# Patient Record
Sex: FEMALE | Race: BLACK OR AFRICAN AMERICAN | Employment: FULL TIME | URBAN - METROPOLITAN AREA
[De-identification: names, ages, dates, MRNs, and addresses within clinical notes are randomized per-mention and may not be internally consistent; named-entity substitution may affect disease eponyms.]

---

## 2024-03-17 ENCOUNTER — HOSPITAL ENCOUNTER (EMERGENCY)
Facility: HOSPITAL | Age: 54
Discharge: HOME | End: 2024-03-17
Attending: STUDENT IN AN ORGANIZED HEALTH CARE EDUCATION/TRAINING PROGRAM
Payer: COMMERCIAL

## 2024-03-17 ENCOUNTER — APPOINTMENT (OUTPATIENT)
Dept: CT IMAGING | Facility: HOSPITAL | Age: 54
End: 2024-03-17
Attending: STUDENT IN AN ORGANIZED HEALTH CARE EDUCATION/TRAINING PROGRAM
Payer: COMMERCIAL

## 2024-03-17 ENCOUNTER — APPOINTMENT (OUTPATIENT)
Dept: GENERAL RADIOLOGY | Facility: HOSPITAL | Age: 54
End: 2024-03-17
Attending: STUDENT IN AN ORGANIZED HEALTH CARE EDUCATION/TRAINING PROGRAM
Payer: COMMERCIAL

## 2024-03-17 VITALS
HEART RATE: 80 BPM | RESPIRATION RATE: 19 BRPM | OXYGEN SATURATION: 96 % | DIASTOLIC BLOOD PRESSURE: 63 MMHG | BODY MASS INDEX: 47.8 KG/M2 | TEMPERATURE: 98 F | HEIGHT: 64 IN | SYSTOLIC BLOOD PRESSURE: 111 MMHG | WEIGHT: 280 LBS

## 2024-03-17 DIAGNOSIS — K21.00 GASTROESOPHAGEAL REFLUX DISEASE WITH ESOPHAGITIS WITHOUT HEMORRHAGE: Primary | ICD-10-CM

## 2024-03-17 LAB
ALBUMIN SERPL-MCNC: 4.6 G/DL (ref 3.2–4.8)
ALBUMIN/GLOB SERPL: 1.5 {RATIO} (ref 1–2)
ALP LIVER SERPL-CCNC: 109 U/L
ALT SERPL-CCNC: 13 U/L
ANION GAP SERPL CALC-SCNC: 7 MMOL/L (ref 0–18)
AST SERPL-CCNC: 23 U/L (ref ?–34)
BASOPHILS # BLD AUTO: 0.02 X10(3) UL (ref 0–0.2)
BASOPHILS NFR BLD AUTO: 0.5 %
BILIRUB SERPL-MCNC: 0.5 MG/DL (ref 0.3–1.2)
BUN BLD-MCNC: 12 MG/DL (ref 9–23)
BUN/CREAT SERPL: 12.2 (ref 10–20)
CALCIUM BLD-MCNC: 9.9 MG/DL (ref 8.7–10.4)
CHLORIDE SERPL-SCNC: 107 MMOL/L (ref 98–112)
CO2 SERPL-SCNC: 28 MMOL/L (ref 21–32)
CREAT BLD-MCNC: 0.98 MG/DL
DEPRECATED RDW RBC AUTO: 37.3 FL (ref 35.1–46.3)
EGFRCR SERPLBLD CKD-EPI 2021: 68 ML/MIN/1.73M2 (ref 60–?)
EOSINOPHIL # BLD AUTO: 0.16 X10(3) UL (ref 0–0.7)
EOSINOPHIL NFR BLD AUTO: 4 %
ERYTHROCYTE [DISTWIDTH] IN BLOOD BY AUTOMATED COUNT: 11.7 % (ref 11–15)
GLOBULIN PLAS-MCNC: 3.1 G/DL (ref 2.8–4.4)
GLUCOSE BLD-MCNC: 96 MG/DL (ref 70–99)
HCT VFR BLD AUTO: 40.3 %
HGB BLD-MCNC: 12.8 G/DL
IMM GRANULOCYTES # BLD AUTO: 0.01 X10(3) UL (ref 0–1)
IMM GRANULOCYTES NFR BLD: 0.3 %
LYMPHOCYTES # BLD AUTO: 1.68 X10(3) UL (ref 1–4)
LYMPHOCYTES NFR BLD AUTO: 42.2 %
MCH RBC QN AUTO: 28.2 PG (ref 26–34)
MCHC RBC AUTO-ENTMCNC: 31.8 G/DL (ref 31–37)
MCV RBC AUTO: 88.8 FL
MONOCYTES # BLD AUTO: 0.41 X10(3) UL (ref 0.1–1)
MONOCYTES NFR BLD AUTO: 10.3 %
NEUTROPHILS # BLD AUTO: 1.7 X10 (3) UL (ref 1.5–7.7)
NEUTROPHILS # BLD AUTO: 1.7 X10(3) UL (ref 1.5–7.7)
NEUTROPHILS NFR BLD AUTO: 42.7 %
OSMOLALITY SERPL CALC.SUM OF ELEC: 294 MOSM/KG (ref 275–295)
PLATELET # BLD AUTO: 299 10(3)UL (ref 150–450)
POTASSIUM SERPL-SCNC: 4 MMOL/L (ref 3.5–5.1)
PROT SERPL-MCNC: 7.7 G/DL (ref 5.7–8.2)
RBC # BLD AUTO: 4.54 X10(6)UL
SODIUM SERPL-SCNC: 142 MMOL/L (ref 136–145)
TROPONIN I SERPL HS-MCNC: <3 NG/L
WBC # BLD AUTO: 4 X10(3) UL (ref 4–11)

## 2024-03-17 PROCEDURE — 93005 ELECTROCARDIOGRAM TRACING: CPT

## 2024-03-17 PROCEDURE — 93010 ELECTROCARDIOGRAM REPORT: CPT

## 2024-03-17 PROCEDURE — 85025 COMPLETE CBC W/AUTO DIFF WBC: CPT | Performed by: STUDENT IN AN ORGANIZED HEALTH CARE EDUCATION/TRAINING PROGRAM

## 2024-03-17 PROCEDURE — 80053 COMPREHEN METABOLIC PANEL: CPT | Performed by: STUDENT IN AN ORGANIZED HEALTH CARE EDUCATION/TRAINING PROGRAM

## 2024-03-17 PROCEDURE — 96374 THER/PROPH/DIAG INJ IV PUSH: CPT

## 2024-03-17 PROCEDURE — 99285 EMERGENCY DEPT VISIT HI MDM: CPT

## 2024-03-17 PROCEDURE — 71045 X-RAY EXAM CHEST 1 VIEW: CPT | Performed by: STUDENT IN AN ORGANIZED HEALTH CARE EDUCATION/TRAINING PROGRAM

## 2024-03-17 PROCEDURE — 84484 ASSAY OF TROPONIN QUANT: CPT | Performed by: STUDENT IN AN ORGANIZED HEALTH CARE EDUCATION/TRAINING PROGRAM

## 2024-03-17 PROCEDURE — S0028 INJECTION, FAMOTIDINE, 20 MG: HCPCS | Performed by: STUDENT IN AN ORGANIZED HEALTH CARE EDUCATION/TRAINING PROGRAM

## 2024-03-17 PROCEDURE — 71260 CT THORAX DX C+: CPT | Performed by: STUDENT IN AN ORGANIZED HEALTH CARE EDUCATION/TRAINING PROGRAM

## 2024-03-17 PROCEDURE — 96375 TX/PRO/DX INJ NEW DRUG ADDON: CPT

## 2024-03-17 RX ORDER — KETOROLAC TROMETHAMINE 15 MG/ML
15 INJECTION, SOLUTION INTRAMUSCULAR; INTRAVENOUS ONCE
Status: COMPLETED | OUTPATIENT
Start: 2024-03-17 | End: 2024-03-17

## 2024-03-17 RX ORDER — FAMOTIDINE 20 MG/1
20 TABLET, FILM COATED ORAL 2 TIMES DAILY PRN
Qty: 30 TABLET | Refills: 0 | Status: SHIPPED | OUTPATIENT
Start: 2024-03-17 | End: 2024-04-16

## 2024-03-17 RX ORDER — FAMOTIDINE 10 MG/ML
20 INJECTION, SOLUTION INTRAVENOUS ONCE
Status: COMPLETED | OUTPATIENT
Start: 2024-03-17 | End: 2024-03-17

## 2024-03-17 NOTE — ED INITIAL ASSESSMENT (HPI)
Pt arrives by herself for midsternal chest pain radiating to her back, pt states she woke up to the pain last night. Pt denies shortness of breath.

## 2024-03-18 LAB
ATRIAL RATE: 87 BPM
P AXIS: 53 DEGREES
P-R INTERVAL: 172 MS
Q-T INTERVAL: 412 MS
QRS DURATION: 146 MS
QTC CALCULATION (BEZET): 495 MS
R AXIS: -34 DEGREES
T AXIS: 27 DEGREES
VENTRICULAR RATE: 87 BPM

## 2024-03-18 NOTE — ED PROVIDER NOTES
[place] Emergency Department Note  Patient: Damari Waterman Age: 53 year old Sex: female      MRN: [id number]  : 1970    Patient Seen in: [facility] Emergency Department    History     Chief Complaint   Patient presents with    Chest Pain     Stated Complaint: chest pain    History obtained from: Patient     53-year-old female with past medical history of hypertension, asthma, lupus presenting today for evaluation of chest pain.  She states that starting last night, she was laying in bed when she developed pain and heaviness in the center of her chest rating to her back.  She states that the pain woke her up from sleep at 1 point last night.  States that pain has persisted through today.  She denies any associate shortness of breath.  Denies any nausea or vomiting.  Denies any worsening with exertion.  Denies any lower extremity edema.  Denies any numbness or tingling of the extremities.  Denies any headaches, neck pain or vision changes.    Review of Systems:  Review of Systems  Positive for stated complaint: chest pain. Constitutional and vital signs reviewed. All other systems reviewed and negative except as noted above.    Patient History:  Past Medical History:   Diagnosis Date    Asthma (HCC)     Essential hypertension     Glaucoma     Lupus (HCC)        Past Surgical History:   Procedure Laterality Date    TOTAL ABDOM HYSTERECTOMY          No family history on file.    Specific Social Determinants of Health:   Social History     Socioeconomic History    Marital status: Single   Tobacco Use    Smoking status: Never    Smokeless tobacco: Never   Vaping Use    Vaping Use: Never used   Substance and Sexual Activity    Alcohol use: Never    Drug use: Never           PSFH elements reviewed from today and agreed except as otherwise stated in HPI.    Physical Exam     ED Triage Vitals [24 1644]   /73   Pulse 83   Resp 16   Temp 98.1 °F (36.7 °C)   Temp src Oral   SpO2 98 %   O2 Device None  (Room air)       Current:/63   Pulse 80   Temp 98.1 °F (36.7 °C) (Oral)   Resp 19   Ht 162.6 cm (5' 4\")   Wt 127 kg   SpO2 96%   BMI 48.06 kg/m²         Physical Exam  Constitutional:       General: She is not in acute distress.  HENT:      Head: Normocephalic and atraumatic.      Mouth/Throat:      Mouth: Mucous membranes are moist.   Eyes:      Extraocular Movements: Extraocular movements intact.   Cardiovascular:      Rate and Rhythm: Normal rate and regular rhythm.      Heart sounds: Normal heart sounds.   Pulmonary:      Effort: Pulmonary effort is normal. No respiratory distress.      Breath sounds: Normal breath sounds.   Abdominal:      Palpations: Abdomen is soft.      Tenderness: There is no abdominal tenderness.   Skin:     General: Skin is warm and dry.      Capillary Refill: Capillary refill takes less than 2 seconds.      Findings: No rash.   Neurological:      General: No focal deficit present.      Mental Status: She is alert and oriented to person, place, and time.   Psychiatric:         Mood and Affect: Mood normal.         Behavior: Behavior normal.         ED Course   Labs:   Labs Reviewed   COMP METABOLIC PANEL (14) - Abnormal; Notable for the following components:       Result Value    Alkaline Phosphatase 109 (*)     All other components within normal limits   TROPONIN I HIGH SENSITIVITY - Normal   CBC WITH DIFFERENTIAL WITH PLATELET    Narrative:     The following orders were created for panel order CBC With Differential With Platelet.  Procedure                               Abnormality         Status                     ---------                               -----------         ------                     CBC W/ DIFFERENTIAL[[id number]]                              Final result                 Please view results for these tests on the individual orders.   RAINBOW DRAW LAVENDER   RAINBOW DRAW LIGHT GREEN   CBC W/ DIFFERENTIAL     Radiology findings:  I personally reviewed the  images.   CT CHEST PE AORTA (IV ONLY) (CPT=71260)    Result Date: 3/17/2024  CONCLUSION:   No evidence of acute pulmonary embolism to the level of the main pulmonary arterial level.  More distal assessment cannot be reliably performed due to technical limitations.  No evidence of right heart strain.  Small hiatal hernia.  Diffuse esophageal wall thickening, which can be seen in the setting of chronic gastroesophageal reflux.     Dictated by (CST): Audelia Interiano MD on 3/17/2024 at 7:41 PM     Finalized by (CST): Audelia Interiano MD on 3/17/2024 at 7:46 PM          XR CHEST AP PORTABLE  (CPT=71045)    Result Date: 3/17/2024  CONCLUSION:   Pulmonary vascular congestion, without overt pulmonary edema.  Minimal left basilar subsegmental atelectasis.    Dictated by (CST): Audelia Interiano MD on 3/17/2024 at 6:10 PM     Finalized by (CST): Audelia Interiano MD on 3/17/2024 at 6:12 PM           EKG as interpreted by me: Ventricular rate 87, normal sinus rhythm, left axis, no AK interval prolongation, widened QRS consistent with right bundle branch block, no ST segment elevations or depressions, no abnormal T wave inversions  Cardiac Monitor: Interpreted by me.   Pulse Readings from Last 1 Encounters:   03/17/24 80   , sinus,     External non-ED records reviewed independently by me: PCP note from 1/28/2020 reviewed confirming patient has a past medical history of asthma and depression.    MDM   53-year-old female with past medical history of hypertension, asthma, lupus presenting today for evaluation of chest pain since last night.  Upon arrival to emergency department, well-appearing and in no acute distress.  Vitals are within normal limits.  Lungs clear to auscultation bilaterally no increased work of breathing.  Abdomen is soft and nontender.  No lower extremity edema.    Differential diagnoses considered includes, but is not limited to: ACS, pulmonary embolism, aortic dissection, GERD, biliary obstructive  process,    Will obtain the following tests: CBC, CMP, troponin, chest x-ray, CT PE, EKG  Please see ED course for my independent review of these tests/imaging results.    Initial Medications/Therapeutics administered: Toradol, Pepcid    Chronic conditions affecting care: Hypertension, asthma, lupus    Workup and medications considered but not ordered: None    Social Determinants of Health that impacted care: None     ED course: Independent reviewed the chest x-ray images that show no evidence of focal opacity to suggest pneumonia.  Labs reassuring with negative troponin.  Electrolytes within normal limits.  No evidence of biliary obstructive process.  CT PE images show no evidence of PE or aortic dissection.  Does show esophageal wall thickening consistent with esophagitis likely secondary to GERD.  Suspect patient's symptoms secondary to acid reflux with element of esophagitis.  Discussed trial course of Pepcid as well as close GI follow-up should symptoms not improve within the next 1 to 2 weeks.  Return precautions were provided and all questions answered.  Patient expressed understanding and agreement with this plan.      Disposition and Plan     Clinical Impression:  1. Gastroesophageal reflux disease with esophagitis without hemorrhage        Disposition:  Discharge    Follow-up:  Flor Jenkins  [address] [place] [id number] [phone]    Schedule an appointment as soon as possible for a visit in 2 day(s)  As needed, If symptoms worsen    Kiran Fraser MD  [address] [id number] [phone]    Schedule an appointment as soon as possible for a visit in 1 week(s)  As needed, If symptoms worsen      Medications Prescribed:  Discharge Medication List as of 3/17/2024  8:15 PM        START taking these medications    Details   famotidine (PEPCID) 20 MG Oral Tab Take 1 tablet (20 mg total) by mouth 2 (two) times daily as needed for Heartburn., Print, Disp-30 tablet,  R-0               This note may have been created using voice dictation technology and may include inadvertent errors.      Cynthia Yip MD  Emergency Medicine

## 2024-03-18 NOTE — DISCHARGE INSTRUCTIONS
Thank you for seeking care at [facility] Emergency Department.    You have been seen and evaluated.    We discussed the results of your workup   Please read the instructions provided   If given prescriptions, take as instructed    Your symptoms are likely related to acid reflux, also called Gastroesophageal Reflux Disease (GERD). You should elevate the head of your bed at night to decrease the amount of acid that is refluxing into your esophagus. This can be done either by putting six- to eight-inch blocks under the legs at the head of the bed or a Styrofoam wedge under the mattress, or by using several pillows under your upper back. Also, avoid eating two to three hours before bedtime or lying down soon after eating. Eliminate food triggers of pain - foods that you note to worsen or cause your symptoms. Such foods typically include fatty foods, tomatoes, caffeine, chocolate, spicy foods, food with high fat content, carbonated beverages, and peppermint. Avoid tight-fitting clothes.     Remember, your care process does not end after your visit today. Please follow-up with your doctor within 1-2 days for a follow-up check to ensure you are  improving, to see if you need any further evaluation/testing, or to evaluate for any alternate diagnoses.     Please return to the emergency department if you develop difficulty breathing, fevers, inability to keep down fluids, chest pain, worsening abdominal pain, or if you develop any other new or concerning symptoms as these could be signs of more serious medical illness.    We hope you feel better.

## 2024-03-20 ENCOUNTER — TELEPHONE (OUTPATIENT)
Facility: CLINIC | Age: 54
End: 2024-03-20

## 2024-03-20 NOTE — TELEPHONE ENCOUNTER
Spoke to patient    Scheduled her for a discharge f/u with the first available appt.  Advised patient to bring insurance card to visit    Location, date, and time confirmed with patient  Sent patient link for [facility]    Your Appointments      Monday April 08, 2024  3:00 PM  Consult with Lisa Mac PA-C  [facility] ([facility]) [facility] [address] [address]  [phone]

## 2024-04-01 ENCOUNTER — OFFICE VISIT (OUTPATIENT)
Facility: CLINIC | Age: 54
End: 2024-04-01

## 2024-04-01 ENCOUNTER — LAB ENCOUNTER (OUTPATIENT)
Dept: LAB | Facility: HOSPITAL | Age: 54
End: 2024-04-01
Attending: PHYSICIAN ASSISTANT
Payer: COMMERCIAL

## 2024-04-01 ENCOUNTER — TELEPHONE (OUTPATIENT)
Facility: CLINIC | Age: 54
End: 2024-04-01

## 2024-04-01 VITALS
HEART RATE: 86 BPM | SYSTOLIC BLOOD PRESSURE: 163 MMHG | HEIGHT: 64 IN | WEIGHT: 276 LBS | BODY MASS INDEX: 47.12 KG/M2 | DIASTOLIC BLOOD PRESSURE: 100 MMHG

## 2024-04-01 DIAGNOSIS — K21.9 GASTROESOPHAGEAL REFLUX DISEASE, UNSPECIFIED WHETHER ESOPHAGITIS PRESENT: ICD-10-CM

## 2024-04-01 DIAGNOSIS — R93.5 ABNORMAL CT OF THE ABDOMEN: ICD-10-CM

## 2024-04-01 DIAGNOSIS — Z12.11 COLON CANCER SCREENING: Primary | ICD-10-CM

## 2024-04-01 DIAGNOSIS — R93.89 ABNORMAL CT SCAN: ICD-10-CM

## 2024-04-01 DIAGNOSIS — Z12.11 COLON CANCER SCREENING: ICD-10-CM

## 2024-04-01 DIAGNOSIS — Z80.0 FAMILY HISTORY OF COLON CANCER: ICD-10-CM

## 2024-04-01 DIAGNOSIS — K21.9 GASTROESOPHAGEAL REFLUX DISEASE WITHOUT ESOPHAGITIS: ICD-10-CM

## 2024-04-01 DIAGNOSIS — K21.9 GASTROESOPHAGEAL REFLUX DISEASE WITHOUT ESOPHAGITIS: Primary | ICD-10-CM

## 2024-04-01 PROCEDURE — 83013 H PYLORI (C-13) BREATH: CPT

## 2024-04-01 RX ORDER — AMLODIPINE BESYLATE 5 MG/1
TABLET ORAL
COMMUNITY

## 2024-04-01 RX ORDER — ALBUTEROL SULFATE 90 UG/1
AEROSOL, METERED RESPIRATORY (INHALATION)
COMMUNITY

## 2024-04-01 RX ORDER — LOSARTAN POTASSIUM 100 MG/1
1 TABLET ORAL DAILY
COMMUNITY

## 2024-04-01 RX ORDER — SEMAGLUTIDE 2.68 MG/ML
INJECTION, SOLUTION SUBCUTANEOUS
COMMUNITY
Start: 2024-03-24

## 2024-04-01 RX ORDER — DOXYCYCLINE 100 MG/1
CAPSULE ORAL
COMMUNITY

## 2024-04-01 RX ORDER — PANTOPRAZOLE SODIUM 40 MG/1
40 TABLET, DELAYED RELEASE ORAL
Qty: 180 TABLET | Refills: 0 | Status: SHIPPED | OUTPATIENT
Start: 2024-04-01 | End: 2024-06-30

## 2024-04-01 RX ORDER — DEXLANSOPRAZOLE 60 MG/1
CAPSULE, DELAYED RELEASE ORAL
COMMUNITY

## 2024-04-01 RX ORDER — MONTELUKAST SODIUM 10 MG/1
1 TABLET ORAL DAILY
COMMUNITY

## 2024-04-01 RX ORDER — PANTOPRAZOLE SODIUM 40 MG/1
1 TABLET, DELAYED RELEASE ORAL DAILY
COMMUNITY
End: 2024-04-01

## 2024-04-01 RX ORDER — ERGOCALCIFEROL 1.25 MG/1
50000 CAPSULE ORAL WEEKLY
COMMUNITY
Start: 2024-03-11

## 2024-04-01 RX ORDER — LAMOTRIGINE 150 MG/1
TABLET ORAL
COMMUNITY

## 2024-04-01 RX ORDER — FUROSEMIDE 20 MG/1
TABLET ORAL
COMMUNITY

## 2024-04-01 RX ORDER — TRIAMCINOLONE ACETONIDE 1 MG/G
CREAM TOPICAL
COMMUNITY

## 2024-04-01 NOTE — TELEPHONE ENCOUNTER
Pt finished their appt with Estefania.  Estefania mentioned to follow up in 2 months but currently doesn't have a schedule up.  Please advise

## 2024-04-01 NOTE — TELEPHONE ENCOUNTER
Patient called.  She said she was to set up a follow up in 4-6 weeks.  Accepted below appointment and aware it will be at the same location as her appointment today.    Your Appointments      Monday May 06, 2024  1:00 PM  Follow Up Visit with Lisa Mac PA-C  [facility] ([facility]) [facility] [address] [address]  [phone]

## 2024-04-01 NOTE — PROGRESS NOTES
[facility] - Gastroenterology                                                                                                               Reason for consult:   Chief Complaint   Patient presents with    Hospital F/U       Requesting physician or provider: Flor Jenkins      HPI:   Damari Waterman is a 53 year old year-old female with history of HTN, asthma, lupus who presents for ER follow up.     Patient seen in ER on 3/17/24 for chest pain. ED course: Independent reviewed the chest x-ray images that show no evidence of focal opacity to suggest pneumonia. Labs reassuring with negative troponin. Electrolytes within normal limits. No evidence of biliary obstructive process. CT PE images show no evidence of PE or aortic dissection. Does show esophageal wall thickening consistent with esophagitis likely secondary to GERD.     Since being seen in the ER she was started on famotidine. She feels as though the chest pain has improved slightly. Can have intermittent nausea and vomiting. She can have epigastric pain that can feel as though her abdomen is in a knot. Endorses dysphagia about once a week. No odynophagia. Appetite is good. No unintentional weight loss.     Has BM every other day. She has to strain al lot with each BM.  she denies brbpr and/or melena.    Most recent blood work 3/17 without anemia.     NSAIDS: PRN, naproxen  Tobacco: none  Alcohol: none  Marijuana: none  Illicit drugs: none    FH GI malignancy- sister colon cancer  FH celiac dz- none  FH liver dz- none  FH IBD- none    Last endoscopies:  7/19/2019- Colonoscopy  FINDINGS:   Colon polyps     RECOMMENDATIONS:   1. Follow up colonoscopy in in 5 year(s) pending any final pathology results and barring any intervening symptoms/concerns or changes in family history.       Wt Readings from Last 6 Encounters:   04/01/24 276 lb (125.2 kg)   03/17/24 280 lb (127 kg)        History, Medications, Allergies, ROS:       Past Medical History:   Diagnosis Date    Asthma (HCC)     Essential hypertension     Glaucoma     Lupus (HCC)       Past Surgical History:   Procedure Laterality Date    TOTAL ABDOM HYSTERECTOMY        Family Hx: History reviewed. No pertinent family history.   Social History:   Social History     Socioeconomic History    Marital status: Single   Tobacco Use    Smoking status: Never    Smokeless tobacco: Never   Vaping Use    Vaping Use: Never used   Substance and Sexual Activity    Alcohol use: Never    Drug use: Never        Medications (Active prior to today's visit):  Current Outpatient Medications   Medication Sig Dispense Refill    VENTOLIN  (90 Base) MCG/ACT Inhalation Aero Soln INHALE 2 PUFFS PO INTO THE LUNGS EVERY FOUR HOURS      amLODIPine 5 MG Oral Tab       diclofenac (VOLTAREN) 1 % External Gel       dexlansoprazole (DEXILANT) 60 MG Oral Capsule Delayed Release       Doxycycline Monohydrate 100 MG Oral Cap       ergocalciferol 1.25 MG (31851 UT) Oral Cap Take 1 capsule (50,000 Units total) by mouth once a week.      furosemide 20 MG Oral Tab       lamoTRIgine 150 MG Oral Tab       losartan 100 MG Oral Tab Take 1 tablet (100 mg total) by mouth daily.      metFORMIN 500 MG Oral Tab       montelukast 10 MG Oral Tab Take 1 tablet (10 mg total) by mouth daily.      OZEMPIC, 2 MG/DOSE, 8 MG/3ML Subcutaneous Solution Pen-injector       triamcinolone 0.1 % External Cream       pantoprazole 40 MG Oral Tab EC Take 1 tablet (40 mg total) by mouth 2 (two) times daily before meals. 180 tablet 0    famotidine (PEPCID) 20 MG Oral Tab Take 1 tablet (20 mg total) by mouth 2 (two) times daily as needed for Heartburn. 30 tablet 0       Allergies:  No Known Allergies    ROS:   CONSTITUTIONAL: negative for fevers, chills, sweats and weight loss  EYES Negative for red eyes, yellow eyes, changes in vision  HEENT: Negative for dysphagia and hoarseness  RESPIRATORY: Negative for cough and shortness of  breath  CARDIOVASCULAR: Negative for chest pain, palpitations  GASTROINTESTINAL: See HPI  GENITOURINARY: Negative for dysuria and frequency  MUSCULOSKELETAL: Negative for arthralgias and myalgias  NEUROLOGICAL: Negative for dizziness and headaches  BEHAVIOR/PSYCH: Negative for anxiety and poor appetite    PHYSICAL EXAM:   Blood pressure (!) 163/100, pulse 86, height 5' 4\" (1.626 m), weight 276 lb (125.2 kg).    GEN: WD/WN, NAD  HEENT: Supple symmetrical, trachea midline  CV: RRR, the extremities are warm and well perfused   LUNGS: No increased work of breathing  ABDOMEN: No scars, normal bowel sounds, soft, non-tender, non-distended no rebound or guarding, no masses, no hepatomegaly  MSK: No redness, no warmth, no swelling of joints  SKIN: No jaundice, no erythema, no rashes  HEMATOLOGIC: No bleeding, no bruising  NEURO: Alert and interactive, normal gait    Labs/Imaging/Procedures:     Patient's pertinent labs and imaging were reviewed and discussed with patient today.     Lab Results   Component Value Date    WBC 4.0 03/17/2024    RBC 4.54 03/17/2024    HGB 12.8 03/17/2024    HCT 40.3 03/17/2024    MCV 88.8 03/17/2024    MCH 28.2 03/17/2024    MCHC 31.8 03/17/2024    RDW 11.7 03/17/2024    .0 03/17/2024        Lab Results   Component Value Date    GLU 96 03/17/2024    BUN 12 03/17/2024    BUNCREA 12.2 03/17/2024    CREATSERUM 0.98 03/17/2024    ANIONGAP 7 03/17/2024    CA 9.9 03/17/2024    OSMOCALC 294 03/17/2024    ALKPHO 109 (H) 03/17/2024    AST 23 03/17/2024    ALT 13 03/17/2024    BILT 0.5 03/17/2024    TP 7.7 03/17/2024    ALB 4.6 03/17/2024    GLOBULIN 3.1 03/17/2024     03/17/2024    K 4.0 03/17/2024     03/17/2024    CO2 28.0 03/17/2024        CT CHEST PE AORTA (IV ONLY) (CPT=71260)    Result Date: 3/17/2024  PROCEDURE: CT CHEST PE AORTA (IV ONLY) (CPT=71260)  COMPARISON: None.  INDICATIONS: chest pain  TECHNIQUE: CT images of the chest were obtained with non-ionic intravenous contrast  material.  Automated exposure control for dose reduction was used. Adjustment of the mA and/or kV was done based on the patient's size. Use of iterative reconstruction technique for dose reduction was used. Dose information is transmitted to the ACR (American College of Radiology) NRDR (National Radiology Data Registry) which includes the Dose Index Registry.  FINDINGS:  PULMONARY VASCULATURE: This is a limited assessment for acute pulmonary embolism due to patient body habitus and timing of contrast/technique.  There is no acute pulmonary embolus involving the main or main right or left pulmonary arteries.  The lobar and more distal pulmonary arterial branches cannot be reliably assessed on this examination.  The main pulmonary artery measures normal in size at 3.0 cm.   CARDIAC: The heart is not enlarged. There is no bowing of the interventricular septum to suggest right ventricular strain.  THORACIC AORTA: There is no ectasia of the ascending thoracic aorta, without aneurysmal dilation.  MEDIASTINUM/FAB: No mass or lymphadenopathy.  This is within the limitation of phase of contrast. There is diffuse esophageal wall thickening, which can be seen in the setting of chronic gastroesophageal reflux.   LUNGS/PLEURA: The trachea and central bronchi are clear.  There is minimal dependent atelectasis at the lung bases.   CHEST WALL: No thyroidal mass, within the limitation of artifact. No axillary lymphadenopathy.  LIMITED ABDOMEN: Small hiatal hernia.  Postsurgical changes from prior gastric bypass.   BONES: There is multilevel degenerative disease of the spine.          CONCLUSION:   No evidence of acute pulmonary embolism to the level of the main pulmonary arterial level.  More distal assessment cannot be reliably performed due to technical limitations.  No evidence of right heart strain.  Small hiatal hernia.  Diffuse esophageal wall thickening, which can be seen in the setting of chronic gastroesophageal reflux.      Dictated by (CST): Audelia Interiano MD on 3/17/2024 at 7:41 PM     Finalized by (CST): Audelia Interiano MD on 3/17/2024 at 7:46 PM          XR CHEST AP PORTABLE  (CPT=71045)    Result Date: 3/17/2024  PROCEDURE: XR CHEST AP PORTABLE  (CPT=71045) TIME: 5:28 p.m..   COMPARISON: None.  INDICATIONS: Midsternal chest pain radiating to her back today. No injury.  TECHNIQUE:   Single view.   FINDINGS:   DEVICES: None. CARDIOMEDIASTINAL:The cardiomediastinal silhouette is borderline enlarged, accentuated by technique  .  FAB:     The hilar contours are within normal limits. LUNGS/PLEURA:  There is central pulmonary vascular congestion.  Minimal curvilinear opacity at the periphery of the left lung in its mid to lower portion.  Right pleural spaces are clear.  Left pleural spaces likely clear, with overlapping soft tissue at  the base. BONES/OTHER:  There is multilevel degenerative disease of the spine.           CONCLUSION:   Pulmonary vascular congestion, without overt pulmonary edema.  Minimal left basilar subsegmental atelectasis.    Dictated by (CST): Audelia Interiano MD on 3/17/2024 at 6:10 PM     Finalized by (CST): Audelia Interiano MD on 3/17/2024 at 6:12 PM                  .  ASSESSMENT/PLAN:   Damari Waterman is a 53 year old year-old female with history of HTN, asthma, lupus who presents for ER follow up.     #GERD  #abnormal CT a/p  Daily GERD. Went to ER for chest pain, cardiac work up negative CT demonstrated esophageal thickening. Discussed plan for h pylori testing and starting PPI. Will also plan for EGD with colonoscopy.     #crc screening   #family hx of colon cancer  Previous cln 7/2019. Due for 5 year recall. Notes chronic straining with BM. No recent change. No brbpr no melena. No weight loss or decreased appetite. Family hx of colon cancer with sister. Advised for repeat colonoscopy.     Recommendations:  - get h pylori testing today     GERD  -start pantoprazole 40 mg twice   -May take Tums or other  over the counter antacid to relieve intermittent heartburn  -Chew foods carefully prior to swallowing  -Increase water intake to 8 (8oz) glasses per day  -Decrease food triggers (Spicy foods, milk products near end of day, chocolate and fatty foods)  -Avoid aspirin, NSAIDs (i.e. Ibuprofen, motrin, Advil, Aleeve, naproxen), caffeine, alcohol, tobacco  -Avoid eating meals 3 hours before bed time  -Avoid wearing tight fitting clothes  -Elevate head of bed to 30 degrees with blocks under legs at head of bed     -follow up in 4-6 weeks       1. Schedule colonoscopy/EGD with Dr. Logan or Dr. Kan with [facility] [Diagnosis: crc screening, GERD, abnormal CT]  2.  bowel prep from pharmacy ([facility])    3. Continue all medications as normal for your procedure.    4. Read all bowel prep instructions carefully. Bowel prep instructions can also be found online at:  [email or web address]     5. AVOID seeds, nuts, popcorn, raw fruits and vegetables for 3 days before procedure    6. You MAY need to go for COVID testing 72 hours before procedure. The testing team will call you a few days before your procedure to discuss with you if testing is required. If you are asked to go for COVID testing and do not completed the test, the procedure cannot be performed.     7. If you start any NEW medication after your visit today, please notify us. Certain medications (like iron or weight please notify us. Certain medications (like iron or weight loss medications) will need to be held before the procedure, or the procedure cannot be performed safely.          Orders This Visit:  Orders Placed This Encounter   Procedures    Helicobacter Pylori Breath Test, Adult       Meds This Visit:  Requested Prescriptions     Signed Prescriptions Disp Refills    pantoprazole 40 MG Oral Tab  tablet 0     Sig: Take 1 tablet (40 mg total) by mouth 2 (two) times daily before meals.       Imaging & Referrals:  None    ENDOSCOPIC RISK BENEFIT  DISCUSSION: I described the procedure in great detail with the patient. I discussed the risks and benefits, including but not limited to: bleeding, perforation, infection, anesthesia complications, and even death. Patient will be NPO after midnight and will have a person physically present at time of pick-up to drive patient home. Patient verbalized understanding and agrees to proceed with procedure as planned.      Lisa Mac PA-C   4/1/2024        This note was partially prepared using [employer] voice recognition dictation software. As a result, errors may occur. When identified, these errors have been corrected. While every attempt is made to correct errors during dictation, discrepancies may still exist.

## 2024-04-01 NOTE — TELEPHONE ENCOUNTER
Scheduled for:  Colonoscopy 57360 EGD [id number]  Provider Name:  Dr. Kan  Date:  08/21/2024  Location:[facility]  Sedation:  MAC  Time: 12:30pm (Patient is aware arrival time is at 11:30am)  Prep:  Trilyte Prep Instructions Given At The Office Visit.    Meds/Allergies Reconciled?:  Lisa Mac PA-C Reviewed  Diagnosis with codes:  Colon Screening Z12.11/ GERD K21.9/ Abnormal CT R93.89  Was patient informed to call insurance with codes (Y/N):  Yes  Referral sent?:  Referral was sent at the time of electronic surgical scheduling.  [facility] or [facility] notified?:  I sent an electronic request to Endo Scheduling and received a confirmation today.  Medication Orders:  Pt is aware to NOT take iron pills, herbal meds and diet supplements for 7 days before exam. Also to NOT take any form of alcohol, recreational drugs and any forms of ED meds 24 hours before exam.   Misc Orders:       Further instructions given by staff:  I provide prep instructions to patient at the time of the appointment and reviewed date, time and location, she verbalized that she understood and is aware to call if she has any questions.    Patient was informed about the new cancellation policy for his/her procedure. Patient was also given a copy of the cancellation policy at the time of the appointment and verbalized understanding.

## 2024-04-01 NOTE — PATIENT INSTRUCTIONS
Recommendations:  - get h pylori testing today     GERD  -start pantoprazole 40 mg twice   -May take Tums or other over the counter antacid to relieve intermittent heartburn  -Chew foods carefully prior to swallowing  -Increase water intake to 8 (8oz) glasses per day  -Decrease food triggers (Spicy foods, milk products near end of day, chocolate and fatty foods)  -Avoid aspirin, NSAIDs (i.e. Ibuprofen, motrin, Advil, Aleeve, naproxen), caffeine, alcohol, tobacco  -Avoid eating meals 3 hours before bed time  -Avoid wearing tight fitting clothes  -Elevate head of bed to 30 degrees with blocks under legs at head of bed     -follow up in 4-6 weeks       1. Schedule colonoscopy/EGD with Dr. Logan or Dr. Kan with MAC [Diagnosis: crc screening, GERD, abnormal CT]    2.  bowel prep from pharmacy (split trilyte)    3. Continue all medications as normal for your procedure.    4. Read all bowel prep instructions carefully. Bowel prep instructions can also be found online at:  [email or web address]     5. AVOID seeds, nuts, popcorn, raw fruits and vegetables for 3 days before procedure    6. You MAY need to go for COVID testing 72 hours before procedure. The testing team will call you a few days before your procedure to discuss with you if testing is required. If you are asked to go for COVID testing and do not completed the test, the procedure cannot be performed.     7. If you start any NEW medication after your visit today, please notify us. Certain medications (like iron or weight loss medications) will need to be held before the procedure, or the procedure cannot be performed safely.

## 2024-04-03 LAB — H PYLORI BREATH TEST: NEGATIVE

## 2024-04-03 RX ORDER — POLYETHYLENE GLYCOL 3350, SODIUM CHLORIDE, SODIUM BICARBONATE, POTASSIUM CHLORIDE 420; 11.2; 5.72; 1.48 G/4L; G/4L; G/4L; G/4L
POWDER, FOR SOLUTION ORAL
Qty: 1 EACH | Refills: 0 | Status: SHIPPED | OUTPATIENT
Start: 2024-04-03

## 2024-04-15 ENCOUNTER — TELEPHONE (OUTPATIENT)
Dept: INTERNAL MEDICINE CLINIC | Facility: CLINIC | Age: 54
End: 2024-04-15

## 2024-04-15 ENCOUNTER — OFFICE VISIT (OUTPATIENT)
Dept: INTERNAL MEDICINE CLINIC | Facility: CLINIC | Age: 54
End: 2024-04-15

## 2024-04-15 VITALS
OXYGEN SATURATION: 97 % | BODY MASS INDEX: 47.12 KG/M2 | DIASTOLIC BLOOD PRESSURE: 81 MMHG | WEIGHT: 276 LBS | HEIGHT: 64 IN | HEART RATE: 91 BPM | SYSTOLIC BLOOD PRESSURE: 136 MMHG

## 2024-04-15 DIAGNOSIS — Z79.890 ENCOUNTER FOR MONITORING POSTMENOPAUSAL ESTROGEN REPLACEMENT THERAPY: ICD-10-CM

## 2024-04-15 DIAGNOSIS — H40.9 GLAUCOMA OF LEFT EYE, UNSPECIFIED GLAUCOMA TYPE: ICD-10-CM

## 2024-04-15 DIAGNOSIS — R42 DIZZINESS: ICD-10-CM

## 2024-04-15 DIAGNOSIS — H25.9 SENILE CATARACT OF RIGHT EYE, UNSPECIFIED AGE-RELATED CATARACT TYPE: ICD-10-CM

## 2024-04-15 DIAGNOSIS — R09.89 PULMONARY CONGESTION: ICD-10-CM

## 2024-04-15 DIAGNOSIS — M17.0 OSTEOARTHRITIS OF BOTH KNEES, UNSPECIFIED OSTEOARTHRITIS TYPE: ICD-10-CM

## 2024-04-15 DIAGNOSIS — M32.9 SYSTEMIC LUPUS ERYTHEMATOSUS, UNSPECIFIED SLE TYPE, UNSPECIFIED ORGAN INVOLVEMENT STATUS (HCC): ICD-10-CM

## 2024-04-15 DIAGNOSIS — R07.9 CHEST PAIN, EXERTIONAL: ICD-10-CM

## 2024-04-15 DIAGNOSIS — G47.33 OSA (OBSTRUCTIVE SLEEP APNEA): ICD-10-CM

## 2024-04-15 DIAGNOSIS — Z51.81 ENCOUNTER FOR MONITORING POSTMENOPAUSAL ESTROGEN REPLACEMENT THERAPY: ICD-10-CM

## 2024-04-15 DIAGNOSIS — K59.00 CONSTIPATION, UNSPECIFIED CONSTIPATION TYPE: ICD-10-CM

## 2024-04-15 DIAGNOSIS — Z00.00 ENCOUNTER FOR MEDICAL EXAMINATION TO ESTABLISH CARE: Primary | ICD-10-CM

## 2024-04-15 DIAGNOSIS — F41.1 GENERALIZED ANXIETY DISORDER: ICD-10-CM

## 2024-04-15 DIAGNOSIS — L65.9 ALOPECIA: ICD-10-CM

## 2024-04-15 DIAGNOSIS — R42 VERTIGO: ICD-10-CM

## 2024-04-15 DIAGNOSIS — H93.8X1 CONGESTION OF RIGHT EAR: ICD-10-CM

## 2024-04-15 PROBLEM — M54.2 NECK PAIN: Status: RESOLVED | Noted: 2022-12-28 | Resolved: 2024-04-15

## 2024-04-15 PROBLEM — H66.91 RIGHT OTITIS MEDIA: Status: RESOLVED | Noted: 2020-05-15 | Resolved: 2024-04-15

## 2024-04-15 PROBLEM — M54.50 ACUTE BILATERAL LOW BACK PAIN: Status: RESOLVED | Noted: 2019-05-13 | Resolved: 2024-04-15

## 2024-04-15 PROBLEM — G25.81 RESTLESS LEG SYNDROME: Status: ACTIVE | Noted: 2021-06-22

## 2024-04-15 PROBLEM — K59.01 SLOW TRANSIT CONSTIPATION: Status: RESOLVED | Noted: 2020-05-15 | Resolved: 2024-04-15

## 2024-04-15 PROBLEM — J40 BRONCHITIS: Status: RESOLVED | Noted: 2023-09-20 | Resolved: 2024-04-15

## 2024-04-15 PROBLEM — E11.9 TYPE 2 DIABETES MELLITUS WITHOUT COMPLICATION (HCC): Status: ACTIVE | Noted: 2023-09-20

## 2024-04-15 PROBLEM — L93.0 DISCOID LUPUS: Status: ACTIVE | Noted: 2018-02-08

## 2024-04-15 PROBLEM — E55.9 VITAMIN D DEFICIENCY: Status: RESOLVED | Noted: 2020-06-24 | Resolved: 2024-04-15

## 2024-04-15 PROBLEM — I10 BENIGN ESSENTIAL HYPERTENSION: Status: ACTIVE | Noted: 2023-09-20

## 2024-04-15 PROBLEM — K21.9 GASTROESOPHAGEAL REFLUX DISEASE: Status: ACTIVE | Noted: 2017-07-25

## 2024-04-15 PROBLEM — D25.9 UTERINE LEIOMYOMA: Status: ACTIVE | Noted: 2023-03-29

## 2024-04-15 PROBLEM — J45.909 ASTHMA (HCC): Status: ACTIVE | Noted: 2024-04-15

## 2024-04-15 PROBLEM — F31.81 BIPOLAR II DISORDER (HCC): Status: ACTIVE | Noted: 2018-03-08

## 2024-04-15 PROBLEM — M54.9 BACKACHE: Status: RESOLVED | Noted: 2023-09-20 | Resolved: 2024-04-15

## 2024-04-15 PROBLEM — T73.0XXA EFFECTS OF HUNGER: Status: RESOLVED | Noted: 2023-09-20 | Resolved: 2024-04-15

## 2024-04-15 PROBLEM — D50.9 IRON DEFICIENCY ANEMIA: Status: RESOLVED | Noted: 2021-06-22 | Resolved: 2024-04-15

## 2024-04-15 PROBLEM — E66.01 MORBID (SEVERE) OBESITY DUE TO EXCESS CALORIES (HCC): Status: ACTIVE | Noted: 2020-05-15

## 2024-04-15 PROBLEM — M54.89 INTERSCAPULAR PAIN: Status: RESOLVED | Noted: 2022-12-28 | Resolved: 2024-04-15

## 2024-04-15 PROBLEM — M25.519 SHOULDER PAIN: Status: RESOLVED | Noted: 2023-09-20 | Resolved: 2024-04-15

## 2024-04-15 PROBLEM — E11.9 TYPE 2 DIABETES MELLITUS WITHOUT COMPLICATION (HCC): Status: RESOLVED | Noted: 2023-09-20 | Resolved: 2024-04-15

## 2024-04-15 PROBLEM — E87.6 HYPOKALEMIA: Status: RESOLVED | Noted: 2023-09-20 | Resolved: 2024-04-15

## 2024-04-15 PROCEDURE — 3008F BODY MASS INDEX DOCD: CPT

## 2024-04-15 PROCEDURE — 99204 OFFICE O/P NEW MOD 45 MIN: CPT

## 2024-04-15 PROCEDURE — 3075F SYST BP GE 130 - 139MM HG: CPT

## 2024-04-15 PROCEDURE — 3079F DIAST BP 80-89 MM HG: CPT

## 2024-04-15 RX ORDER — POLYETHYLENE GLYCOL 3350 17 G/17G
17 POWDER, FOR SOLUTION ORAL DAILY
Qty: 10 EACH | Refills: 3 | Status: SHIPPED | OUTPATIENT
Start: 2024-04-15

## 2024-04-15 RX ORDER — DOCUSATE SODIUM 100 MG/1
100 CAPSULE, LIQUID FILLED ORAL 2 TIMES DAILY
COMMUNITY

## 2024-04-15 RX ORDER — PREDNISONE 20 MG/1
20 TABLET ORAL DAILY
Qty: 5 TABLET | Refills: 0 | Status: SHIPPED | OUTPATIENT
Start: 2024-04-15 | End: 2024-04-20

## 2024-04-15 RX ORDER — ACETAMINOPHEN 325 MG/1
325 TABLET ORAL EVERY 6 HOURS PRN
COMMUNITY

## 2024-04-15 RX ORDER — LATANOPROST 50 UG/ML
1 SOLUTION/ DROPS OPHTHALMIC NIGHTLY
COMMUNITY

## 2024-04-15 RX ORDER — MELATONIN
325
COMMUNITY

## 2024-04-15 RX ORDER — CARVEDILOL 12.5 MG/1
12.5 TABLET ORAL 2 TIMES DAILY WITH MEALS
COMMUNITY

## 2024-04-15 RX ORDER — SPIRONOLACTONE 25 MG/1
25 TABLET ORAL DAILY
COMMUNITY

## 2024-04-15 RX ORDER — POLYETHYLENE GLYCOL 3350 17 G/17G
17 POWDER, FOR SOLUTION ORAL DAILY
COMMUNITY
End: 2024-04-15

## 2024-04-15 RX ORDER — NAPROXEN 500 MG/1
500 TABLET ORAL 2 TIMES DAILY PRN
COMMUNITY

## 2024-04-15 RX ORDER — ESTRADIOL 1 MG/1
1 TABLET ORAL DAILY
COMMUNITY

## 2024-04-15 RX ORDER — IBUPROFEN 600 MG/1
600 TABLET ORAL EVERY 6 HOURS PRN
COMMUNITY
End: 2024-04-15 | Stop reason: ALTCHOICE

## 2024-04-15 RX ORDER — MULTIVIT-MIN/IRON FUM/FOLIC AC 7.5 MG-4
1 TABLET ORAL DAILY
COMMUNITY

## 2024-04-15 RX ORDER — AMOXICILLIN 500 MG/1
500 TABLET, FILM COATED ORAL 3 TIMES DAILY
COMMUNITY
Start: 2024-04-09

## 2024-04-15 RX ORDER — LORAZEPAM 0.5 MG/1
0.5 TABLET ORAL DAILY PRN
COMMUNITY

## 2024-04-15 NOTE — TELEPHONE ENCOUNTER
Patient saw Susan Darnell today. She was prescribed Miralax and prednisone. She states [facility] told her they do not have the prednisone prescription.     Spoke to [facility]. They received both. They are ready for . They will contact patient.      polyethylene glycol, PEG 3350, 17 g Oral Powd Pack 10 each 3 4/15/2024 --    Sig - Route: Take 17 g by mouth daily. - Oral    Sent to pharmacy as: PEG 3350 17 GM Oral Packet (Miralax)    E-Prescribing Status: Receipt confirmed by pharmacy (4/15/2024  3:00 PM CDT)      predniSONE 20 MG Oral Tab 5 tablet 0 4/15/2024 4/20/2024    Sig - Route: Take 1 tablet (20 mg total) by mouth daily for 5 days. - Oral    Sent to pharmacy as: predniSONE 20 MG Oral Tablet (Deltasone)    E-Prescribing Status: Receipt confirmed by pharmacy (4/15/2024  3:12 PM CDT)

## 2024-04-15 NOTE — PROGRESS NOTES
Subjective:   Damari Waterman is a 54 year old female who presents for ER F/U (Was in ER on 3/17 for Gastroesophageal reflux disease with esophagitis without hemorrhage, already saw GI)     Presents as a new patient for ER follow-up, and establish care  Needs referrals to new specialists - OBGYN, ophthalmology, ortho- gets knee injections  Already has an appointment scheduled with rheumatologist    PMHx  HTN  Diabetes type 2 - was on metformin due to PCOS, now resolved  Asthma - mild   SLE - has an appt with rheum  Bipolar II - lamotrigine, had severe depression and had lost two siblings   TOD - lamotrigine, lorazepam every other month   GERD   ABAD - wears a CPAP mask   Glaucoma - left eye   Osteoarthritis - back, shoulders, bilat knees \"bone on bone\"     Previously on plaquenil then was on leflunamide - has been off for 2 months   Also has dry eye due to lupus - uses systane      Seen in ER on 3/17 for chest pain and diagnosed with GERD  Started on PPI and will have EGD/colonoscopy   Symptoms are better, sometimes still has symptoms depending on what she eats. Anything with grease or butter causes symptoms   XR done in ER also showed pulmonary congestion  She has felt some chest pain and shortness of breath with exertion    Currently taking amoxicillin for root canal     Balance feels off due to congestion, had vertigo in the past and feels pressure and congestion in the right ear. This feels like previous vertigo episodes. Has dizziness when she turns her head or gets up quickly.   Took meclizine last night and is already taking an allergy pill and flonase nasal spray    History/Other:    Chief Complaint Reviewed and Verified  Nursing Notes Reviewed and   Verified  Tobacco Reviewed  Allergies Reviewed  Medications Reviewed    Problem List Reviewed  Medical History Reviewed  Surgical History   Reviewed  OB Status Reviewed  Family History Reviewed  Social History   Reviewed         Tobacco:  She has never  smoked tobacco.    Current Outpatient Medications   Medication Sig Dispense Refill    naproxen 500 MG Oral Tab Take 1 tablet (500 mg total) by mouth 2 (two) times daily as needed.      amoxicillin 500 MG Oral Tab Take 1 tablet (500 mg total) by mouth 3 (three) times daily.      carvedilol 12.5 MG Oral Tab Take 1 tablet (12.5 mg total) by mouth 2 (two) times daily with meals.      latanoprost 0.005 % Ophthalmic Solution Place 1 drop into the left eye nightly.      spironolactone 25 MG Oral Tab Take 1 tablet (25 mg total) by mouth daily.      LORazepam 0.5 MG Oral Tab Take 1 tablet (0.5 mg total) by mouth daily as needed for Anxiety.      estradiol 1 MG Oral Tab Take 1 tablet (1 mg total) by mouth daily.      Calcium Citrate-Vitamin D 200-6.25 MG-MCG Oral Tab Take 1 tablet by mouth daily.      docusate sodium 100 MG Oral Cap Take 1 capsule (100 mg total) by mouth 2 (two) times daily.      ferrous sulfate 325 (65 FE) MG Oral Tab EC Take 1 tablet (325 mg total) by mouth daily with breakfast.      Multiple Vitamins-Minerals (MULTI-VITAMIN/MINERALS) Oral Tab Take 1 tablet by mouth daily.      acetaminophen 325 MG Oral Tab Take 1 tablet (325 mg total) by mouth every 6 (six) hours as needed for Pain.      polyethylene glycol, PEG 3350, 17 g Oral Powd Pack Take 17 g by mouth daily. 10 each 3    predniSONE 20 MG Oral Tab Take 1 tablet (20 mg total) by mouth daily for 5 days. 5 tablet 0    VENTOLIN  (90 Base) MCG/ACT Inhalation Aero Soln INHALE 2 PUFFS PO INTO THE LUNGS EVERY FOUR HOURS      amLODIPine 5 MG Oral Tab       ergocalciferol 1.25 MG (42038 UT) Oral Cap Take 1 capsule (50,000 Units total) by mouth once a week.      furosemide 20 MG Oral Tab       lamoTRIgine 150 MG Oral Tab       losartan 100 MG Oral Tab Take 1 tablet (100 mg total) by mouth daily.      montelukast 10 MG Oral Tab Take 1 tablet (10 mg total) by mouth daily.      OZEMPIC, 2 MG/DOSE, 8 MG/3ML Subcutaneous Solution Pen-injector       pantoprazole  40 MG Oral Tab EC Take 1 tablet (40 mg total) by mouth 2 (two) times daily before meals. 180 tablet 0    famotidine (PEPCID) 20 MG Oral Tab Take 1 tablet (20 mg total) by mouth 2 (two) times daily as needed for Heartburn. 30 tablet 0    PEG 3350-KCl-Na Bicarb-NaCl (TRILYTE) 420 g Oral Recon Soln Take prep as directed by gastro office. May substitute with Trilyte/generic equivalent if needed. (Patient not taking: Reported on 4/15/2024) 1 each 0         Review of Systems:  Review of Systems   Constitutional: Negative.    Respiratory: Negative.     Cardiovascular: Negative.    Gastrointestinal: Negative.    Skin: Negative.    Neurological:  Positive for dizziness (takes meclizine).         Objective:   /81   Pulse 91   Ht 5' 4\" (1.626 m)   Wt 276 lb (125.2 kg)   SpO2 97%   BMI 47.38 kg/m²  Estimated body mass index is 47.38 kg/m² as calculated from the following:    Height as of this encounter: 5' 4\" (1.626 m).    Weight as of this encounter: 276 lb (125.2 kg).  Physical Exam  Vitals reviewed.   Constitutional:       General: She is not in acute distress.     Appearance: Normal appearance. She is well-developed.   Cardiovascular:      Rate and Rhythm: Normal rate and regular rhythm.      Heart sounds: Normal heart sounds.   Pulmonary:      Effort: Pulmonary effort is normal.      Breath sounds: Normal breath sounds.   Abdominal:      General: Bowel sounds are normal.      Palpations: Abdomen is soft.   Skin:     General: Skin is warm and dry.   Neurological:      Mental Status: She is alert and oriented to person, place, and time.         Assessment & Plan:   1. Encounter for medical examination to establish care (Primary)  2. Generalized anxiety disorder  -     Behavioral Health Consultation  3. ABAD (obstructive sleep apnea)  -     CARD ECHO 2D DOPPLER (CPT=93306); Future; Expected date: 04/15/2024  4. Alopecia  -     Derm Referral - In Network  5. Systemic lupus erythematosus, unspecified SLE type,  unspecified organ involvement status (HCC)  -     Derm Referral - In Network  6. Senile cataract of right eye, unspecified age-related cataract type  -     Ophthalmology Referral - In Network  7. Glaucoma of left eye, unspecified glaucoma type  -     Ophthalmology Referral - In Network  8. Vertigo  -     Physical Therapy Referral - [facility]  -     predniSONE; Take 1 tablet (20 mg total) by mouth daily for 5 days.  Dispense: 5 tablet; Refill: 0  9. Osteoarthritis of both knees, unspecified osteoarthritis type  -     Ortho Referral - In Network  10. Chest pain, exertional  -     CARD ECHO 2D DOPPLER (CPT=93306); Future; Expected date: 04/15/2024  -     CARD NUC STRESS TEST LEXISCAN (CPT=93015/04253/); Future; Expected date: 04/15/2024  11. Dizziness  -     CARD ECHO 2D DOPPLER (CPT=93306); Future; Expected date: 04/15/2024  -     CARD NUC STRESS TEST LEXISCAN (CPT=93015/87250/); Future; Expected date: 04/15/2024  12. Pulmonary congestion  -     CARD ECHO 2D DOPPLER (CPT=93306); Future; Expected date: 04/15/2024  13. Encounter for monitoring postmenopausal estrogen replacement therapy  -     OBG - INTERNAL  14. Congestion of right ear  -     predniSONE; Take 1 tablet (20 mg total) by mouth daily for 5 days.  Dispense: 5 tablet; Refill: 0  15. Constipation, unspecified constipation type  -     PEG 3350; Take 17 g by mouth daily.  Dispense: 10 each; Refill: 3    Follow up for annual physical     MANUEL Warren, 4/15/2024, 11:43 AM

## 2024-04-19 ENCOUNTER — TELEPHONE (OUTPATIENT)
Age: 54
End: 2024-04-19

## 2024-04-24 ENCOUNTER — HOSPITAL ENCOUNTER (OUTPATIENT)
Dept: CV DIAGNOSTICS | Facility: HOSPITAL | Age: 54
End: 2024-04-24
Payer: COMMERCIAL

## 2024-04-24 ENCOUNTER — APPOINTMENT (OUTPATIENT)
Dept: NUCLEAR MEDICINE | Facility: HOSPITAL | Age: 54
End: 2024-04-24
Payer: COMMERCIAL

## 2024-04-24 ENCOUNTER — HOSPITAL ENCOUNTER (OUTPATIENT)
Dept: NUCLEAR MEDICINE | Facility: HOSPITAL | Age: 54
End: 2024-04-24
Payer: COMMERCIAL

## 2024-05-01 ENCOUNTER — HOSPITAL ENCOUNTER (OUTPATIENT)
Dept: NUCLEAR MEDICINE | Facility: HOSPITAL | Age: 54
Discharge: HOME | End: 2024-05-01
Payer: COMMERCIAL

## 2024-05-01 ENCOUNTER — HOSPITAL ENCOUNTER (OUTPATIENT)
Dept: CV DIAGNOSTICS | Facility: HOSPITAL | Age: 54
Discharge: HOME | End: 2024-05-01
Payer: COMMERCIAL

## 2024-05-01 DIAGNOSIS — R42 DIZZINESS: ICD-10-CM

## 2024-05-01 DIAGNOSIS — R07.9 CHEST PAIN, EXERTIONAL: ICD-10-CM

## 2024-05-01 LAB
% OF MAX PREDICTED HR: 100 %
MAX DIASTOLIC BP: 64 MMHG
MAX HEART RATE: 106 BPM
MAX PREDICTED HEART RATE: 164 BPM
MAX SYSTOLIC BP: 128 MMHG
MAX WORK LOAD: 10

## 2024-05-01 PROCEDURE — 93016 CV STRESS TEST SUPVJ ONLY: CPT | Performed by: INTERNAL MEDICINE

## 2024-05-01 PROCEDURE — 78452 HT MUSCLE IMAGE SPECT MULT: CPT

## 2024-05-01 PROCEDURE — 93018 CV STRESS TEST I&R ONLY: CPT | Performed by: INTERNAL MEDICINE

## 2024-05-01 PROCEDURE — 93017 CV STRESS TEST TRACING ONLY: CPT

## 2024-05-01 RX ORDER — REGADENOSON 0.08 MG/ML
INJECTION, SOLUTION INTRAVENOUS
Status: COMPLETED
Start: 2024-05-01 | End: 2024-05-01

## 2024-05-01 RX ADMIN — REGADENOSON 0.4 MG: 0.08 INJECTION, SOLUTION INTRAVENOUS at 11:13:00

## 2024-05-02 ENCOUNTER — TELEPHONE (OUTPATIENT)
Dept: PHYSICAL THERAPY | Facility: HOSPITAL | Age: 54
End: 2024-05-02

## 2024-05-03 ENCOUNTER — OFFICE VISIT (OUTPATIENT)
Dept: PHYSICAL THERAPY | Age: 54
End: 2024-05-03
Payer: COMMERCIAL

## 2024-05-03 DIAGNOSIS — R42 VERTIGO: Primary | ICD-10-CM

## 2024-05-03 PROCEDURE — 97162 PT EVAL MOD COMPLEX 30 MIN: CPT | Performed by: PHYSICAL THERAPIST

## 2024-05-03 PROCEDURE — 95992 CANALITH REPOSITIONING PROC: CPT | Performed by: PHYSICAL THERAPIST

## 2024-05-03 NOTE — PROGRESS NOTES
PHYSICAL THERAPY EVALUATION:   Referring Physician: Dr. Darnell  Diagnosis: Vertigo (R42)       Date of Onset: march 2024 Date of Service: 5/3/2024  Visit # 1  Scheduled Visits 8  Insurance Authorized visits 8 HMO     PATIENT SUMMARY   Damari Waterman is a 54 year old y/o female who presents to therapy today with reports of dizziness  History of current condition: Pt reports that she has had this for years and has fallen. The most recent time she was on a flight her ears got tight. Pt reports that she gets dizzy with looking down, reading, looking to the R and getting into/out of bed. Pt reports that she feels off balance and like she is floating. Pt reports that she has seen an ENT and neurologist in the past. None recent. Pt reports that she has taken Meclizine for years and it makes her tired. Pt reports that symptoms come and go for long bouts. Pt reports blurred vision, denies double vision. Pt reports a little nausea    Symptoms with cough/sneeze or loud noise: sensitive to loud noises  Falls: none recent, but stumbling  Hx of migraines:  Yes: when she was younger  Hx of vision issue:  Yes: wears glasses, glaucoma  Hx of hearing issues:   No    Dizziness: intermittent  Quality: feels like she is going to fall off balance, dizzy  Aggravates: Supine to/from sit, Rolling, Bending over, Quick head movements, and Looking/reaching up  Relieves: Not moving and Medication    Headache: intermittent coming on more frequently    Cervical pain:  intermittent      Dizziness Handicap Inventory (DHI): 60/100      Current functional limitations include looking down, getting into/out of bed and quick head turns  Social history:  pt works as a   Prior level of function pt was able to look down and read without issues.   Pt goals include to get rid of symptoms.  Past medical history was reviewed with Damari. Significant findings include  has a past medical history of Acute bilateral low back pain  (05/13/2019), Asthma ([facility]), Backache (09/20/2023), Bronchitis (09/20/2023), Effects of hunger (09/20/2023), Essential hypertension, Glaucoma, Hypokalemia (09/20/2023), Interscapular pain (12/28/2022), Iron deficiency anemia (06/22/2021), Lupus ([facility]), Neck pain (12/28/2022), Right otitis media (05/15/2020), Slow transit constipation (05/15/2020), and Type 2 diabetes mellitus without complication ([facility]) (09/20/2023).         ASSESSMENT:    Pt presents with subjective c/o symptoms of motion sensitivity, vertigo, and unsteadiness limiting getting into/out of bed. Pt has signs and symptoms consistent with BPPV R canalisthesis with delayed onset and 5 seconds of reported dizziness. Pt. also has impaired static/dynamic balance, gait, and functional activities with challenges of head turns limiting community ambulation.  Pt. would benefit from skilled Physical Therapy to address the above impairments to get into/out of bed and walk and turn head without difficulties.      Precautions:  None      OBJECTIVE:   Physical Exam:  Posture/Observation: Poor B rounded shoulders and forward head and slouched sitting                         Cervical spine ROM: [id number]%, Ext 50%, R %, L SB 75% , R ROT 75%, L ROT 50%     Coordination Testing:   Finger to Nose: WNL  Heel to Shin: WNL      Oculomotor/Vestibular Exam:  Spontaneous Nystagmus: In light neg In darkness positive L   Smooth Pursuit: Negative   Saccades: Negative   Convergence: Negative    Cover/Uncover: Negative   Cross Cover: Negative   Head Thrust: Negative  Dynamic Visual Acuity: Negative (Positive if 3 or more)  Gaze Evoked Nystagmus: Negative  Head Shaking Nystagmus: Positive L      Positional Testing:(Performed with gogles on)  Ann-Hallpike: R positive with reported dizziness, no nystagmus, L neg       Today's Treatment and Response:   Pt education was provided on exam findings, treatment diagnosis, treatment plan, expectations, and prognosis. Pt was also provided  recommendations for  education on BPPV and handout .     Charges: PT Eval x1(Moderate), 1 CRP      Total Timed Treatment: 5 min     Total Treatment Time: 30 min     Based on clinical rationale and outcome measures, this evaluation involved Moderate Complexity decision making due to 3+ personal factors/comorbidities, 4+ body structures involved/activity limitations, and evolving symptoms including changing dizziness symptoms           PLAN OF CARE:    To be met in 6- 8 weeks  1.Pt. to be independent home exercise program, post treatment instructions to allow patients safe return to ADL’s.  2..Pt to be able to get into/out of bed without symptoms.  3. Pt to be able to demo quick head turns from R/L in order to assist with crossing the street.  4. Pt to report ability to bend forward to tie shoe laces without dizziness        Frequency / Duration: Patient will be seen for 1 x/week or a total of 8 visits over a 90 day period.  Treatment will include: Home exercise program development and instruction, Patient/family education, Balance training, Canalith Repositioning Maneuver, Eye/head coordination exercises, Sensory organization training, Optokinetic stimulation, Strengthening, ROM, Exertion training; Gait Training; Manual Therapy;     Education or treatment limitation: None  Rehab Potential: good    Patient was advised of these findings, precautions, and treatment options and has agreed to actively participate in planning and for this course of care.    Thank you for your referral.  If you have any questions, please contact me at Dept: [phone]    Sincerely,  Electronically signed by therapist: Essie Nunez PT, DPT, cert MDT      Physician's certification required: Yes  I certify the need for these services furnished under this plan of treatment and while under my care.    X___________________________________________________ Date____________________    Certification From: 5/3/2024  To:8/1/2024

## 2024-05-06 ENCOUNTER — OFFICE VISIT (OUTPATIENT)
Facility: CLINIC | Age: 54
End: 2024-05-06

## 2024-05-06 VITALS
DIASTOLIC BLOOD PRESSURE: 77 MMHG | HEIGHT: 64 IN | BODY MASS INDEX: 47.46 KG/M2 | WEIGHT: 278 LBS | HEART RATE: 93 BPM | SYSTOLIC BLOOD PRESSURE: 131 MMHG

## 2024-05-06 DIAGNOSIS — K21.9 GASTROESOPHAGEAL REFLUX DISEASE, UNSPECIFIED WHETHER ESOPHAGITIS PRESENT: ICD-10-CM

## 2024-05-06 DIAGNOSIS — Z12.11 COLON CANCER SCREENING: ICD-10-CM

## 2024-05-06 DIAGNOSIS — R10.13 EPIGASTRIC PAIN: Primary | ICD-10-CM

## 2024-05-06 PROCEDURE — 3075F SYST BP GE 130 - 139MM HG: CPT | Performed by: PHYSICIAN ASSISTANT

## 2024-05-06 PROCEDURE — 99214 OFFICE O/P EST MOD 30 MIN: CPT | Performed by: PHYSICIAN ASSISTANT

## 2024-05-06 PROCEDURE — 3008F BODY MASS INDEX DOCD: CPT | Performed by: PHYSICIAN ASSISTANT

## 2024-05-06 PROCEDURE — 3078F DIAST BP <80 MM HG: CPT | Performed by: PHYSICIAN ASSISTANT

## 2024-05-06 NOTE — PROGRESS NOTES
[facility] - Gastroenterology                                                                                                               Reason for consult:   Chief Complaint   Patient presents with    Follow - Up       Requesting physician or provider: Flor Jenkins      HPI:   Damari Waterman is a 54 year old year-old female with history of HTN, asthma, lupus, gastric bypass who presents for follow up.      Patient seen in ER on 3/17/24 for chest pain. ED course: Independent reviewed the chest x-ray images that show no evidence of focal opacity to suggest pneumonia. Labs reassuring with negative troponin. Electrolytes within normal limits. No evidence of biliary obstructive process. CT PE images show no evidence of PE or aortic dissection. Does show esophageal wall thickening consistent with esophagitis likely secondary to GERD. Seen by myself on 4/1/24 and started on PPI and had EGD/cln scheduled for August. Today, she feels as though she has had 90% improvement in symptoms.  She can have lingering abdominal pain, but notes it is often around her weekly dose of Ozempic. She denies bloating, nausea and vomiting. She has been having more straining with BM. No brbpr or melena.   Weight has been stable. No decreased appetite.     NSAIDS: PRN, naproxen  Tobacco: none  Alcohol: none  Marijuana: none  Illicit drugs: none     FH GI malignancy- sister colon cancer  FH celiac dz- none  FH liver dz- none  FH IBD- none     Last endoscopies:  7/19/2019- Colonoscopy  FINDINGS:   Colon polyps     RECOMMENDATIONS:   1. Follow up colonoscopy in in 5 year(s) pending any final pathology results and barring any intervening symptoms/concerns or changes in family history.       Wt Readings from Last 6 Encounters:   05/06/24 278 lb (126.1 kg)   04/15/24 276 lb (125.2 kg)   04/01/24 276 lb (125.2 kg)   03/17/24 280 lb (127 kg)        History, Medications, Allergies, ROS:      Past Medical  History:    Acute bilateral low back pain    Anemia    Asthma (HCC)    Backache    Bronchitis    Effects of hunger    Essential hypertension    Glaucoma    Hypokalemia    Interscapular pain    Last Assessment & Plan:    Formatting of this note might be different from the original.   Patient reports pain is a 2/10 today.      Iron deficiency anemia    Lupus (HCC)    Neck pain    Last Assessment & Plan:    Formatting of this note might be different from the original.   Patient reports pain is a 2/10 today.      Right otitis media    Slow transit constipation    Type 2 diabetes mellitus without complication (HCC)      Past Surgical History:   Procedure Laterality Date    Lap gastric bypass/daniel-en-y      Total abdom hysterectomy        Family Hx: No family history on file.   Social History:   Social History     Socioeconomic History    Marital status: Single   Tobacco Use    Smoking status: Never    Smokeless tobacco: Never   Vaping Use    Vaping status: Never Used   Substance and Sexual Activity    Alcohol use: Never    Drug use: Never     Social Determinants of Health     Food Insecurity: Low Risk  (5/25/2023)    Received from [facility]    Food Insecurity     Have there been times that your food ran out, and you didn't have money to get more?: No     Are there times that you worry that this might happen?: No   Transportation Needs: Low Risk  (5/25/2023)    Received from [facility]    Transportation Needs     Do you have trouble getting transportation to medical appointments?: No        Medications (Active prior to today's visit):  Current Outpatient Medications   Medication Sig Dispense Refill    naproxen 500 MG Oral Tab Take 1 tablet (500 mg total) by mouth 2 (two) times daily as needed.      carvedilol 12.5 MG Oral Tab Take 1 tablet (12.5 mg total) by mouth 2 (two) times daily with meals.       latanoprost 0.005 % Ophthalmic Solution Place 1 drop into the left eye nightly.      spironolactone 25 MG Oral Tab Take 1 tablet (25 mg total) by mouth daily.      LORazepam 0.5 MG Oral Tab Take 1 tablet (0.5 mg total) by mouth daily as needed for Anxiety.      estradiol 1 MG Oral Tab Take 1 tablet (1 mg total) by mouth daily.      Calcium Citrate-Vitamin D 200-6.25 MG-MCG Oral Tab Take 1 tablet by mouth daily.      docusate sodium 100 MG Oral Cap Take 1 capsule (100 mg total) by mouth 2 (two) times daily.      ferrous sulfate 325 (65 FE) MG Oral Tab EC Take 1 tablet (325 mg total) by mouth daily with breakfast.      Multiple Vitamins-Minerals (MULTI-VITAMIN/MINERALS) Oral Tab Take 1 tablet by mouth daily.      acetaminophen 325 MG Oral Tab Take 1 tablet (325 mg total) by mouth every 6 (six) hours as needed for Pain.      polyethylene glycol, PEG 3350, 17 g Oral Powd Pack Take 17 g by mouth daily. 10 each 3    PEG 3350-KCl-Na Bicarb-NaCl (TRILYTE) 420 g Oral Recon Soln Take prep as directed by gastro office. May substitute with Trilyte/generic equivalent if needed. 1 each 0    VENTOLIN  (90 Base) MCG/ACT Inhalation Aero Soln INHALE 2 PUFFS PO INTO THE LUNGS EVERY FOUR HOURS      amLODIPine 5 MG Oral Tab       ergocalciferol 1.25 MG (88184 UT) Oral Cap Take 1 capsule (50,000 Units total) by mouth once a week.      furosemide 20 MG Oral Tab       lamoTRIgine 150 MG Oral Tab       losartan 100 MG Oral Tab Take 1 tablet (100 mg total) by mouth daily.      montelukast 10 MG Oral Tab Take 1 tablet (10 mg total) by mouth daily.      OZEMPIC, 2 MG/DOSE, 8 MG/3ML Subcutaneous Solution Pen-injector       pantoprazole 40 MG Oral Tab EC Take 1 tablet (40 mg total) by mouth 2 (two) times daily before meals. 180 tablet 0    amoxicillin 500 MG Oral Tab Take 1 tablet (500 mg total) by mouth 3 (three) times daily. (Patient not taking: Reported on 5/6/2024)         Allergies:  No Known Allergies    ROS:   CONSTITUTIONAL:  negative for fevers, chills, sweats and weight loss  EYES Negative for red eyes, yellow eyes, changes in vision  HEENT: Negative for dysphagia and hoarseness  RESPIRATORY: Negative for cough and shortness of breath  CARDIOVASCULAR: Negative for chest pain, palpitations  GASTROINTESTINAL: See HPI  GENITOURINARY: Negative for dysuria and frequency  MUSCULOSKELETAL: Negative for arthralgias and myalgias  NEUROLOGICAL: Negative for dizziness and headaches  BEHAVIOR/PSYCH: Negative for anxiety and poor appetite    PHYSICAL EXAM:   Blood pressure 131/77, pulse 93, height 5' 4\" (1.626 m), weight 278 lb (126.1 kg).    GEN: WD/WN, NAD  HEENT: Supple symmetrical, trachea midline  CV: RRR, the extremities are warm and well perfused   LUNGS: No increased work of breathing  ABDOMEN: No scars, normal bowel sounds, soft, non-tender, non-distended no rebound or guarding, no masses, no hepatomegaly  MSK: No redness, no warmth, no swelling of joints  SKIN: No jaundice, no erythema, no rashes  HEMATOLOGIC: No bleeding, no bruising  NEURO: Alert and interactive, normal gait    Labs/Imaging/Procedures:     Patient's pertinent labs and imaging were reviewed and discussed with patient today.     Lab Results   Component Value Date    WBC 4.0 03/17/2024    RBC 4.54 03/17/2024    HGB 12.8 03/17/2024    HCT 40.3 03/17/2024    MCV 88.8 03/17/2024    MCH 28.2 03/17/2024    MCHC 31.8 03/17/2024    RDW 11.7 03/17/2024    .0 03/17/2024        Lab Results   Component Value Date    GLU 96 03/17/2024    BUN 12 03/17/2024    BUNCREA 12.2 03/17/2024    CREATSERUM 0.98 03/17/2024    ANIONGAP 7 03/17/2024    CA 9.9 03/17/2024    OSMOCALC 294 03/17/2024    ALKPHO 109 (H) 03/17/2024    AST 23 03/17/2024    ALT 13 03/17/2024    BILT 0.5 03/17/2024    TP 7.7 03/17/2024    ALB 4.6 03/17/2024    GLOBULIN 3.1 03/17/2024     03/17/2024    K 4.0 03/17/2024     03/17/2024    CO2 28.0 03/17/2024        CARD NUC STRESS TEST LEXISCAN  (VKB=[id number]/)    Result Date: 5/1/2024  PROCEDURE: CARD NUC STRESS TEST LEXISCAN  COMPARISON: None.  INDICATIONS: R42 Dizziness R07.9 Chest pain, exertional, and hypertension.  TECHNIQUE: Single isotope myocardial perfusion study after Lexiscan (regadenoson) stress done with 8.0 mCi Technetium 99m Sestamibi injected into a left forearm vein at rest.  After a 2 hour delay, pharmacologic stress with 0.4 mg regadenoson was administered intravenously as a slow bolus over 10-20 seconds.  This was immediately followed by 25.7 mCi of technetium 99m Sestamibi injected into the same vein.  Gated SPECT imaging was performed shortly afterwards. FINDINGS: RAW PATIENT DATA: A review of the raw data showed it was adequate for interpretation.   WALL MOTION ANALYSIS: The gated SPECT images showed normal wall motion.  An end diastolic volume of 95 mL was seen.   LV EJECTION FRACTION: 74%.   PERFUSION IMAGING: The myocardial perfusion images post pharmacologic stress demonstrate no perfusion abnormality.  The resting images demonstrate no perfusion abnormality.          CONCLUSION: Normal regadenoson (Lexiscan) myocardial perfusion study with normal wall motion and left ventricular ejection fraction.   Dictated by (CST): Shane Yadav MD on 5/01/2024 at 1:36 PM     Finalized by (CST): Shane Yadav MD on 5/01/2024 at 1:37 PM                  .  ASSESSMENT/PLAN:   Damari Waterman is a 54 year old year-old female with history of HTN, asthma, lupus, gastric bypass who presents for follow up.     #GERD  #epigastric pain  Patient having improvement in symptoms with PPI BID. She can still have some pain, no longer having bloating, nausea or globus. Discussed continuation of PPI and keeping scheduled EGD for August. If pain returns would advise for repeat CT a/p prior to procedure.     #crc screening  Patient started on Ozempic. Will give new instructions with correct holding of medications.     Recommendations  GERD  -pantoprazole 40mg  twice,  at least 30 minutes prior to first meal  -May take Tums or other over the counter antacid to relieve intermittent heartburn  -Chew foods carefully prior to swallowing  -Increase water intake to 8 (8oz) glasses per day  -Decrease food triggers (Spicy foods, milk products near end of day, chocolate and fatty foods)  -Avoid aspirin, NSAIDs (i.e. Ibuprofen, motrin, Advil, Aleeve, naproxen), caffeine, alcohol, tobacco  -Avoid eating meals 3 hours before bed time  -Avoid wearing tight fitting clothes  -Elevate head of bed to 30 degrees with blocks under legs at head of bed     1. Already scheduled for EGD/cln    2.  bowel prep from pharmacy (split trilyte)    3. Hold Ozempic week prior to procedure.     4. Read all bowel prep instructions carefully. Bowel prep instructions can also be found online at:  www.[facility].org/giprep     5. AVOID seeds, nuts, popcorn, raw fruits and vegetables for 3 days before procedure    6. You MAY need to go for COVID testing 72 hours before procedure. The testing team will call you a few days before your procedure to discuss with you if testing is required. If you are asked to go for COVID testing and do not completed the test, the procedure cannot be performed.     7. If you start any NEW medication after your visit today, please notify us. Certain medications (like iron or weight loss medications) will need to be held before the procedure, or the procedure cannot be performed safely.          Orders This Visit:  No orders of the defined types were placed in this encounter.      Meds This Visit:  Requested Prescriptions      No prescriptions requested or ordered in this encounter       Imaging & Referrals:  None      Lisa Mac PA-C   5/6/2024        This note was partially prepared using Dragon Medical voice recognition dictation software. As a result, errors may occur. When identified, these errors have been corrected. While every attempt is made to correct errors  during dictation, discrepancies may still exist.

## 2024-05-06 NOTE — PATIENT INSTRUCTIONS
Recommendations  GERD  -pantoprazole 40mg twice,  at least 30 minutes prior to first meal  -May take Tums or other over the counter antacid to relieve intermittent heartburn  -Chew foods carefully prior to swallowing  -Increase water intake to 8 (8oz) glasses per day  -Decrease food triggers (Spicy foods, milk products near end of day, chocolate and fatty foods)  -Avoid aspirin, NSAIDs (i.e. Ibuprofen, motrin, Advil, Aleeve, naproxen), caffeine, alcohol, tobacco  -Avoid eating meals 3 hours before bed time  -Avoid wearing tight fitting clothes  -Elevate head of bed to 30 degrees with blocks under legs at head of bed     1. Already scheduled for EGD/cln    2.  bowel prep from pharmacy (split trilyte)    3. Hold Ozempic week prior to procedure.     4. Read all bowel prep instructions carefully. Bowel prep instructions can also be found online at:  www.[facility].org/giprep     5. AVOID seeds, nuts, popcorn, raw fruits and vegetables for 3 days before procedure    6. You MAY need to go for COVID testing 72 hours before procedure. The testing team will call you a few days before your procedure to discuss with you if testing is required. If you are asked to go for COVID testing and do not completed the test, the procedure cannot be performed.     7. If you start any NEW medication after your visit today, please notify us. Certain medications (like iron or weight loss medications) will need to be held before the procedure, or the procedure cannot be performed safely.

## 2024-05-07 ENCOUNTER — OFFICE VISIT (OUTPATIENT)
Dept: PHYSICAL THERAPY | Age: 54
End: 2024-05-07
Payer: COMMERCIAL

## 2024-05-07 PROCEDURE — 95992 CANALITH REPOSITIONING PROC: CPT | Performed by: PHYSICAL THERAPIST

## 2024-05-07 NOTE — PROGRESS NOTES
Dx: Vertigo (R42)         Insurance (Authorized # of Visits):  8 O           Authorizing Physician: Dr. Mann Torres MD visit:   Fall Risk: standard         Precautions: n/a         Evaluation Date: 5/3/24  Previous Level of Function: pt was able to look down and read without issues.     Subjective: Pt reports that she feels better since last visit. Pt reports that she still has some dizziness  Dizziness 0/10  Objective: Ann hallpike R positive per pt report, no nystagmus present      Assessment: Pt continues to demo positive ann hallpike per pt report. On second epley pt reported no symptoms.       Goals:   To be met in 6- 8 weeks  1.Pt. to be independent home exercise program, post treatment instructions to allow patients safe return to ADL’s.  2..Pt to be able to get into/out of bed without symptoms.  3. Pt to be able to demo quick head turns from R/L in order to assist with crossing the street.  4. Pt to report ability to bend forward to tie shoe laces without dizziness    Plan: Cont PT per plan of care   Date: 5/7/2024  TX#: 2/8 Date:                 TX#: 3/8 Date:                 TX#: 4/8 Date:                 TX#: 5/8 Date:   Tx#: 6/8   CRP R epley x 2 over 2 pillows   FOTO                                 Charges: 1 CRP       Total Timed Treatment: 0 min  Total Treatment Time: 15 min

## 2024-05-08 ENCOUNTER — HOSPITAL ENCOUNTER (OUTPATIENT)
Dept: GENERAL RADIOLOGY | Facility: HOSPITAL | Age: 54
Discharge: HOME | End: 2024-05-08
Attending: ORTHOPAEDIC SURGERY
Payer: COMMERCIAL

## 2024-05-08 ENCOUNTER — OFFICE VISIT (OUTPATIENT)
Dept: ORTHOPEDICS CLINIC | Facility: CLINIC | Age: 54
End: 2024-05-08

## 2024-05-08 VITALS
DIASTOLIC BLOOD PRESSURE: 78 MMHG | HEART RATE: 80 BPM | BODY MASS INDEX: 47.03 KG/M2 | HEIGHT: 64 IN | WEIGHT: 275.5 LBS | SYSTOLIC BLOOD PRESSURE: 130 MMHG

## 2024-05-08 DIAGNOSIS — M25.561 PAIN IN BOTH KNEES, UNSPECIFIED CHRONICITY: ICD-10-CM

## 2024-05-08 DIAGNOSIS — M25.562 PAIN IN BOTH KNEES, UNSPECIFIED CHRONICITY: Primary | ICD-10-CM

## 2024-05-08 DIAGNOSIS — M25.561 PAIN IN BOTH KNEES, UNSPECIFIED CHRONICITY: Primary | ICD-10-CM

## 2024-05-08 DIAGNOSIS — M25.562 PAIN IN BOTH KNEES, UNSPECIFIED CHRONICITY: ICD-10-CM

## 2024-05-08 DIAGNOSIS — M17.0 PRIMARY OSTEOARTHRITIS OF BOTH KNEES: ICD-10-CM

## 2024-05-08 PROCEDURE — 73562 X-RAY EXAM OF KNEE 3: CPT | Performed by: ORTHOPAEDIC SURGERY

## 2024-05-08 RX ORDER — TRIAMCINOLONE ACETONIDE 40 MG/ML
40 INJECTION, SUSPENSION INTRA-ARTICULAR; INTRAMUSCULAR ONCE
Status: COMPLETED | OUTPATIENT
Start: 2024-05-08 | End: 2024-05-08

## 2024-05-08 RX ADMIN — TRIAMCINOLONE ACETONIDE 40 MG: 40 INJECTION, SUSPENSION INTRA-ARTICULAR; INTRAMUSCULAR at 15:20:00

## 2024-05-08 NOTE — PROGRESS NOTES
NURSING INTAKE COMMENTS:   Chief Complaint   Patient presents with    Knee Pain     Pt in for christine knee pain, pain has been worse at night, pain 6/10, takes tylenol and ibuprofen for pain,       HPI: This 54 year old female presents today with complaints of bilateral knee pain.  She is switching her care from [facility] to [facility].  Previous treatment has included cortisone and gel injections as well as physical therapy and ibuprofen.  She has had 2 different bariatric surgeries as well.    Past Medical History:    Acute bilateral low back pain    Anemia    Asthma (HCC)    Backache    Bronchitis    Effects of hunger    Essential hypertension    Glaucoma    Hypokalemia    Interscapular pain    Last Assessment & Plan:    Formatting of this note might be different from the original.   Patient reports pain is a 2/10 today.      Iron deficiency anemia    Lupus (HCC)    Neck pain    Last Assessment & Plan:    Formatting of this note might be different from the original.   Patient reports pain is a 2/10 today.      Right otitis media    Slow transit constipation    Type 2 diabetes mellitus without complication ([facility])     Past Surgical History:   Procedure Laterality Date    Lap gastric bypass/daniel-en-y      Total abdom hysterectomy       Current Outpatient Medications   Medication Sig Dispense Refill    naproxen 500 MG Oral Tab Take 1 tablet (500 mg total) by mouth 2 (two) times daily as needed.      amoxicillin 500 MG Oral Tab Take 1 tablet (500 mg total) by mouth 3 (three) times daily.      carvedilol 12.5 MG Oral Tab Take 1 tablet (12.5 mg total) by mouth 2 (two) times daily with meals.      latanoprost 0.005 % Ophthalmic Solution Place 1 drop into the left eye nightly.      spironolactone 25 MG Oral Tab Take 1 tablet (25 mg total) by mouth daily.      LORazepam 0.5 MG Oral Tab Take 1 tablet (0.5 mg total) by mouth daily as needed for Anxiety.      estradiol 1 MG Oral Tab Take 1 tablet (1 mg total) by mouth daily.       Calcium Citrate-Vitamin D 200-6.25 MG-MCG Oral Tab Take 1 tablet by mouth daily.      docusate sodium 100 MG Oral Cap Take 1 capsule (100 mg total) by mouth 2 (two) times daily.      ferrous sulfate 325 (65 FE) MG Oral Tab EC Take 1 tablet (325 mg total) by mouth daily with breakfast.      Multiple Vitamins-Minerals (MULTI-VITAMIN/MINERALS) Oral Tab Take 1 tablet by mouth daily.      acetaminophen 325 MG Oral Tab Take 1 tablet (325 mg total) by mouth every 6 (six) hours as needed for Pain.      polyethylene glycol, PEG 3350, 17 g Oral Powd Pack Take 17 g by mouth daily. 10 each 3    PEG 3350-KCl-Na Bicarb-NaCl (TRILYTE) 420 g Oral Recon Soln Take prep as directed by gastro office. May substitute with Trilyte/generic equivalent if needed. 1 each 0    VENTOLIN  (90 Base) MCG/ACT Inhalation Aero Soln INHALE 2 PUFFS PO INTO THE LUNGS EVERY FOUR HOURS      amLODIPine 5 MG Oral Tab       ergocalciferol 1.25 MG (24272 UT) Oral Cap Take 1 capsule (50,000 Units total) by mouth once a week.      furosemide 20 MG Oral Tab       lamoTRIgine 150 MG Oral Tab       losartan 100 MG Oral Tab Take 1 tablet (100 mg total) by mouth daily.      montelukast 10 MG Oral Tab Take 1 tablet (10 mg total) by mouth daily.      OZEMPIC, 2 MG/DOSE, 8 MG/3ML Subcutaneous Solution Pen-injector       pantoprazole 40 MG Oral Tab EC Take 1 tablet (40 mg total) by mouth 2 (two) times daily before meals. 180 tablet 0     No Known Allergies  History reviewed. No pertinent family history.    Social History     Occupational History    Not on file   Tobacco Use    Smoking status: Never    Smokeless tobacco: Never   Vaping Use    Vaping status: Never Used   Substance and Sexual Activity    Alcohol use: Never    Drug use: Never    Sexual activity: Not on file        Review of Systems:  GENERAL: feels generally well, no significant weight loss or weight gain  SKIN: no ulcerated or worrisome skin lesions  EYES:denies blurred vision or double  vision  HEENT: denies new nasal congestion, sinus pain or ST  LUNGS: denies shortness of breath  CARDIOVASCULAR: denies chest pain  GI: no hematemesis, no worsening heartburn, no diarrhea  : no dysuria, no blood in urine, no difficulty urinating, no incontinence  MUSCULOSKELETAL: no other musculoskeletal complaints other than in HPI  NEURO: no numbness or tingling, no weakness or balance disorder  PSYCHE: no depression or anxiety  HEMATOLOGIC: no hx of blood dyscrasia  ENDOCRINE: no thyroid or diabetes issues  ALL/ASTHMA: no new hx of severe allergy or asthma    Physical Examination:    Ht 5' 4\" (1.626 m)   Wt 275 lb 8 oz (125 kg)   BMI 47.29 kg/m²   Constitutional: appears well hydrated, alert and responsive, no acute distress noted  Extremities: Antalgic gait.  Full extension with 100 degrees of flexion bilaterally.  Mild varus deformity bilaterally.      Imaging: CARD NUC STRESS TEST LEXISCAN (IWV=[id number]/)    Result Date: 5/1/2024  PROCEDURE: CARD NUC STRESS TEST LEXISCAN  COMPARISON: None.  INDICATIONS: R42 Dizziness R07.9 Chest pain, exertional, and hypertension.  TECHNIQUE: Single isotope myocardial perfusion study after Lexiscan (regadenoson) stress done with 8.0 mCi Technetium 99m Sestamibi injected into a left forearm vein at rest.  After a 2 hour delay, pharmacologic stress with 0.4 mg regadenoson was administered intravenously as a slow bolus over 10-20 seconds.  This was immediately followed by 25.7 mCi of technetium 99m Sestamibi injected into the same vein.  Gated SPECT imaging was performed shortly afterwards. FINDINGS: RAW PATIENT DATA: A review of the raw data showed it was adequate for interpretation.   WALL MOTION ANALYSIS: The gated SPECT images showed normal wall motion.  An end diastolic volume of 95 mL was seen.   LV EJECTION FRACTION: 74%.   PERFUSION IMAGING: The myocardial perfusion images post pharmacologic stress demonstrate no perfusion abnormality.  The resting images  demonstrate no perfusion abnormality.          CONCLUSION: Normal regadenoson (Lexiscan) myocardial perfusion study with normal wall motion and left ventricular ejection fraction.   Dictated by (CST): Shane Yadav MD on 5/01/2024 at 1:36 PM     Finalized by (CST): Shane Yadav MD on 5/01/2024 at 1:37 PM             Lab Results   Component Value Date    WBC 4.0 03/17/2024    HGB 12.8 03/17/2024    .0 03/17/2024      Lab Results   Component Value Date    GLU 96 03/17/2024    BUN 12 03/17/2024    CREATSERUM 0.98 03/17/2024        Assessment and Plan:  Diagnoses and all orders for this visit:    Pain in both knees, unspecified chronicity  -     XR KNEE (3 VIEWS) AP LAT OBL BILAT EM (CPT=73562-50); Future    Primary osteoarthritis of both knees  -     arthrocentesis major joint  -     triamcinolone acetonide (Kenalog-40) 40 MG/ML injection 40 mg        Assessment: Advanced medial compartment osteoarthritis.  Procedure: The risks and benefits of a cortisone injection were discussed with the patient.  An informational sheet was also provided and the patient had ample time to review it.  Under sterile preparation, the left knee was injected with 40 mg of Kenalog and 4 cc 0.5% marcaine.  The patient tolerated the procedure well.      Plan: We talked about the options for treatment.  She is currently taking ibuprofen.  She declined prescription for physical therapy because she has done this before without improvement.  Injected the left knee with cortisone.  I told her that for elective knee replacement I would want her to reduce her BMI below 40.  I referred to the bariatric center for further care, and she will follow-up with me as needed.    The above note was creating using Dragon speech recognition technology. Please excuse any typos.    VINICIUS URIAS MD

## 2024-05-08 NOTE — PROGRESS NOTES
Per verbal order from Dr. Su, draw up and 4ml of 0.5% Marcaine and 1ml of Kenalog 40 for injection into leftkneeJenny M, CMA  Patient provided education handout for cortisone injection.

## 2024-05-10 ENCOUNTER — TELEPHONE (OUTPATIENT)
Dept: ADMINISTRATIVE | Age: 54
End: 2024-05-10

## 2024-05-10 DIAGNOSIS — E66.01 OBESITY, CLASS III, BMI 40-49.9 (MORBID OBESITY) (HCC): Primary | ICD-10-CM

## 2024-05-10 NOTE — TELEPHONE ENCOUNTER
Hello  This patient does not meet criteria for an approved Bariatric Referral.     This patient has not had a document visit on file with a Registered Dietitian.     Once patient has had a visit on file with a Registered Dietitian a new referral can be placed and processed.     Thank you  Kaylie

## 2024-05-13 ENCOUNTER — HOSPITAL ENCOUNTER (OUTPATIENT)
Dept: CV DIAGNOSTICS | Facility: HOSPITAL | Age: 54
Discharge: HOME | End: 2024-05-13

## 2024-05-13 DIAGNOSIS — R42 DIZZINESS: ICD-10-CM

## 2024-05-13 DIAGNOSIS — R07.9 CHEST PAIN, EXERTIONAL: ICD-10-CM

## 2024-05-13 DIAGNOSIS — G47.33 OSA (OBSTRUCTIVE SLEEP APNEA): ICD-10-CM

## 2024-05-13 DIAGNOSIS — R09.89 PULMONARY CONGESTION: ICD-10-CM

## 2024-05-13 PROCEDURE — 93306 TTE W/DOPPLER COMPLETE: CPT

## 2024-05-13 NOTE — TELEPHONE ENCOUNTER
Called patient and let her know that order for dietitian was placed. Phone number to Central scheduling provided. No further questions or concerns at this time

## 2024-05-16 ENCOUNTER — OFFICE VISIT (OUTPATIENT)
Dept: PHYSICAL THERAPY | Age: 54
End: 2024-05-16
Payer: COMMERCIAL

## 2024-05-16 ENCOUNTER — TELEPHONE (OUTPATIENT)
Dept: INTERNAL MEDICINE CLINIC | Facility: CLINIC | Age: 54
End: 2024-05-16

## 2024-05-16 DIAGNOSIS — M32.9 SLE (SYSTEMIC LUPUS ERYTHEMATOSUS RELATED SYNDROME) (HCC): Primary | ICD-10-CM

## 2024-05-16 DIAGNOSIS — H93.8X1 CONGESTION OF RIGHT EAR: ICD-10-CM

## 2024-05-16 DIAGNOSIS — R42 VERTIGO: ICD-10-CM

## 2024-05-16 PROCEDURE — 95992 CANALITH REPOSITIONING PROC: CPT | Performed by: PHYSICAL THERAPIST

## 2024-05-16 RX ORDER — PREDNISONE 20 MG/1
20 TABLET ORAL DAILY
Qty: 5 TABLET | Refills: 0 | Status: SHIPPED | OUTPATIENT
Start: 2024-05-16 | End: 2024-05-21

## 2024-05-16 NOTE — TELEPHONE ENCOUNTER
Provided 5 day course of prednisone, any further steroids should be managed by her rheumatologist after the appt  Thanks

## 2024-05-16 NOTE — PROGRESS NOTES
Dx: Vertigo (R42)         Insurance (Authorized # of Visits):  8 O           Authorizing Physician: Dr. Mann Torres MD visit:   Fall Risk: standard         Precautions: n/a         Evaluation Date: 5/3/24  Previous Level of Function: pt was able to look down and read without issues.     Subjective: Pt reports that she feels better since last visit. Pt reports that she really hasn't had any dizziness. Pt reports that she would like to hold on PT and see how it goes.  Dizziness 0/10  Objective: Ann hallpike R positive per pt report, no nystagmus present, pt reports less then previous visits      Assessment: Pt continues to demo positive ann hallpike per pt report. On second epley pt reported no symptoms. Discussed future POC with pt. PT and pt decided to put pt on hold for 2 weeks in case symptoms return. If pt does not contact pt will be discharged from PT. Pt was told what to do if symptoms return.       Goals:   To be met in 6- 8 weeks  1.Pt. to be independent home exercise program, post treatment instructions to allow patients safe return to ADL’s.-met  2..Pt to be able to get into/out of bed without symptoms.-met  3. Pt to be able to demo quick head turns from R/L in order to assist with crossing the street.-met  4. Pt to report ability to bend forward to tie shoe laces without dizziness-not assessed    Plan: Pt to be on hold from PT for 2 weeks and if pt does not call will be discharged from PT.    Date: 5/7/2024  TX#: 2/8 Date: 5/16/24                TX#: 3/8 Date:                 TX#: 4/8 Date:                 TX#: 5/8 Date:   Tx#: 6/8   CRP R epley x 2 over 2 pillows  R epley x 2 over 2 pillows  FOTO                                 Charges: 1 CRP       Total Timed Treatment: 0 min  Total Treatment Time: 10 min

## 2024-05-16 NOTE — TELEPHONE ENCOUNTER
Stated that she has LUPUS and currently has flare up.   She will see the rheumatologist for the first time on 5/21/24.   But in the meantime, she would like to request Susan Darnell to refill her prednisone that she prescribed last April.   Preferred pharmacy verified.             Future Appointments   Date Time Provider Department Center   5/21/2024 11:00 AM Lehne, Ramier, DO [facility] [place]   8/21/2024 12:30 PM DARYN, PROCEDURE [facility] None

## 2024-05-16 NOTE — TELEPHONE ENCOUNTER
Spoke with the patient,verified full name and     Informed her of message and instructions below    No further questions

## 2024-05-21 ENCOUNTER — OFFICE VISIT (OUTPATIENT)
Dept: RHEUMATOLOGY | Facility: CLINIC | Age: 54
End: 2024-05-21

## 2024-05-21 ENCOUNTER — LAB ENCOUNTER (OUTPATIENT)
Dept: LAB | Age: 54
End: 2024-05-21
Attending: INTERNAL MEDICINE

## 2024-05-21 ENCOUNTER — APPOINTMENT (OUTPATIENT)
Dept: PHYSICAL THERAPY | Age: 54
End: 2024-05-21
Payer: COMMERCIAL

## 2024-05-21 ENCOUNTER — TELEPHONE (OUTPATIENT)
Dept: RHEUMATOLOGY | Facility: CLINIC | Age: 54
End: 2024-05-21

## 2024-05-21 VITALS — HEIGHT: 64 IN | WEIGHT: 279.81 LBS | BODY MASS INDEX: 47.77 KG/M2

## 2024-05-21 DIAGNOSIS — M06.4 INFLAMMATORY POLYARTHRITIS (HCC): Primary | ICD-10-CM

## 2024-05-21 DIAGNOSIS — R21 RASH: ICD-10-CM

## 2024-05-21 DIAGNOSIS — M06.4 INFLAMMATORY POLYARTHRITIS (HCC): ICD-10-CM

## 2024-05-21 DIAGNOSIS — L93.0 DISCOID LUPUS: ICD-10-CM

## 2024-05-21 PROBLEM — M32.9 SYSTEMIC LUPUS ERYTHEMATOSUS (HCC): Status: RESOLVED | Noted: 2023-09-20 | Resolved: 2024-05-21

## 2024-05-21 LAB
C3 SERPL-MCNC: 111.9 MG/DL (ref 90–170)
C4 SERPL-MCNC: 27.8 MG/DL (ref 12–36)
CRP SERPL-MCNC: <0.4 MG/DL (ref ?–1)
ERYTHROCYTE [SEDIMENTATION RATE] IN BLOOD: 41 MM/HR
HAV IGM SER QL: NONREACTIVE
HBV CORE IGM SER QL: NONREACTIVE
HBV SURFACE AG SERPL QL IA: NONREACTIVE
HCV AB SERPL QL IA: NONREACTIVE
RHEUMATOID FACT SERPL-ACNC: <10 IU/ML (ref ?–14)

## 2024-05-21 PROCEDURE — 86225 DNA ANTIBODY NATIVE: CPT | Performed by: INTERNAL MEDICINE

## 2024-05-21 PROCEDURE — 86200 CCP ANTIBODY: CPT | Performed by: INTERNAL MEDICINE

## 2024-05-21 PROCEDURE — 86480 TB TEST CELL IMMUN MEASURE: CPT

## 2024-05-21 PROCEDURE — 3008F BODY MASS INDEX DOCD: CPT | Performed by: INTERNAL MEDICINE

## 2024-05-21 PROCEDURE — 86039 ANTINUCLEAR ANTIBODIES (ANA): CPT | Performed by: INTERNAL MEDICINE

## 2024-05-21 PROCEDURE — 86431 RHEUMATOID FACTOR QUANT: CPT | Performed by: INTERNAL MEDICINE

## 2024-05-21 PROCEDURE — 86160 COMPLEMENT ANTIGEN: CPT

## 2024-05-21 PROCEDURE — 86140 C-REACTIVE PROTEIN: CPT

## 2024-05-21 PROCEDURE — 99205 OFFICE O/P NEW HI 60 MIN: CPT | Performed by: INTERNAL MEDICINE

## 2024-05-21 PROCEDURE — 86038 ANTINUCLEAR ANTIBODIES: CPT | Performed by: INTERNAL MEDICINE

## 2024-05-21 PROCEDURE — 80074 ACUTE HEPATITIS PANEL: CPT

## 2024-05-21 PROCEDURE — 86235 NUCLEAR ANTIGEN ANTIBODY: CPT | Performed by: INTERNAL MEDICINE

## 2024-05-21 PROCEDURE — 36415 COLL VENOUS BLD VENIPUNCTURE: CPT

## 2024-05-21 PROCEDURE — 85652 RBC SED RATE AUTOMATED: CPT

## 2024-05-21 RX ORDER — HYDROXYCHLOROQUINE SULFATE 200 MG/1
400 TABLET, FILM COATED ORAL DAILY
Qty: 180 TABLET | Refills: 2 | Status: SHIPPED | OUTPATIENT
Start: 2024-05-21

## 2024-05-21 RX ORDER — PREDNISONE 5 MG/1
TABLET ORAL
Qty: 75 TABLET | Refills: 0 | Status: SHIPPED | OUTPATIENT
Start: 2024-05-21 | End: 2024-07-04

## 2024-05-21 NOTE — PATIENT INSTRUCTIONS
Restart Hydroxychloroquine 400 every day   Referral for Dermatology and Ophthalmology   Please have non-fasting labs drawn: I will MYCHART you the results.   Start Prednisone taper in the morning after breakfast. Decrease by one tablet every 3 days until on Prednisone 5 mg every morning. Take Prednisone 5 mg every morning for 3 days. Then, after 3 days, change to as needed to take only in you are in pain up to once daily.    Please follow-up between 2-3 months.

## 2024-05-21 NOTE — PROGRESS NOTES
RHEUMATOLOGY CLINIC- NEW PATIENT    Damari Waterman is a 54 year old female.    ASSESSMENT/PLAN:       ICD-10-CM    1. Inflammatory polyarthritis (HCC)  M06.4 Derm Referral - In Network     Hepatitis Panel, Acute (4)     Quantiferon TB Plus     C-Reactive Protein     Sed Rate, Westergren (Automated)     Complement C3, Serum     Complement C4, Serum     Cyclic Citrullinate Pep. IGG     Anti-Nuclear Antibody (SHARAD) by IFA, Reflex Titer + Specific Antibodies     Rheumatoid Arthritis Factor      2. Discoid lupus  L93.0 Derm Referral - In Network     Hepatitis Panel, Acute (4)     Quantiferon TB Plus     C-Reactive Protein     Sed Rate, Westergren (Automated)     Complement C3, Serum     Complement C4, Serum     Cyclic Citrullinate Pep. IGG     Anti-Nuclear Antibody (SHARAD) by IFA, Reflex Titer + Specific Antibodies     Rheumatoid Arthritis Factor     Ophthalmology Referral - In Network     CANCELED: Ophthalmology Referral - In Network      3. Rash  R21 Derm Referral - In Network     Hepatitis Panel, Acute (4)     Quantiferon TB Plus     C-Reactive Protein     Sed Rate, Westergren (Automated)     Complement C3, Serum     Complement C4, Serum     Cyclic Citrullinate Pep. IGG     Anti-Nuclear Antibody (SHARAD) by IFA, Reflex Titer + Specific Antibodies     Rheumatoid Arthritis Factor            DISCUSSION:  Patient presents to establish care, formally following with [facility] rheumatology for discoid lupus.  On current exam, she does have minimal synovitis of her bilateral MCPs and I suspect the recent p.o. prednisone (which she has been taking 5 days) is masking some of her inflammation.  Patient states she has biopsy-proven discoid lupus that was diagnosed in childhood, however, has continued lesions with occasional boils affecting her axillary areas, under her breast, on her abdomen, and groin.  Additionally, her gelling phenomenon/AM stiffness and synovitis is suspicious of an underlying inflammatory arthropathy.   Therefore, we will do a fresh autoimmune workup including possible rheumatoid arthritis versus SLE inflammatory arthritis versus hidradenitis suppurativa associated inflammatory arthropathy given her recurrent skin lesions.  For now, discussed with patient recession benefits of resuming hydroxychloroquine without noted side effect in the past (patient notes normal eye exam in January).  Referral given for dermatology and ophthalmology.    PLAN:  -Resume hydroxychloroquine 400 mg daily (less than 5 mg/kg).  Discussed with patient can divide up doses and to 200 mg twice daily if GI upset.       -Discussed with patient side effects of Plaquenil which include but are not limited to retinopathy skin changes, blood abnormalities, hepatotoxicity, cardiotoxicity, neuro changes such as dizziness, fatigue, irritability, myopathy.  Pending patient response, patient will require annual Plaquenil eye exams if continued on chronic Plaquenil.  -P.o. prednisone taper x 12 days.  Afterwards, patient can take up to 5 mg daily with breakfast as needed for breakthrough pain.  - Referral given for dermatology and ophthalmology  - Consult/evaluation communicated with referring physician/provider.    I will MyChart patient on receipt of above results.  Otherwise, Pt will f/u in 2-3 months.    Isela Trimble,   5/21/2024   11:21 AM  There is a longitudinal care relationship with me, the care plan reflects the ongoing nature of the continuous relationship of care, and the medical record indicates that there is ongoing treatment of a serious/complex medical condition which I am currently managing.  is Applicable.       Discussed with patient that they are on chronic treatment with a high-risk medication that requires close lab monitoring for possible drug toxicity.  Additionally, discussed with patient give the possible immunosuppressive effects of their medication as well as their underlying hyper-inflammatory state, the importance of  keeping vaccinations and age-appropriate screenings up-to-date, given increased risk of complications and malignancies seen in these patient populations.  Patient to f/u with repeat labs drawn prior (standing labs ordered).  Last QuantiFERON TB testing: ordered  Last Hepatitis testing: ordered    Spent 60 minutes including about 40 minutes of face to face time obtaining history, evaluating patient, and discussing treatment options as well as reviewing prior notes for other providers, reviewing labs, and completing documentation    HPI:     Chief Complaint   Patient presents with    Lupus       I had the pleasure of seeing Damari Waterman on 5/21/2024 as a new outpatient consultation for Discoid Lupus. The patient was originally referred by her PCP, Dr. Flor Jenkins.     54 year old female w/ PMH Discoid SLE, Glaucoma, HTN, asthma, RLS, GERD, ABAD, pre-diabetes who presents to clinic today.Note, patient formally following with [facility] rheumatology, Dr. Connor, with last appointment 9/20/2023.  During that appointment, patient presenting with history of discoid lupus since she was 10 years old, on hydroxychloroquine 200 mg twice daily for 5 years.  However, noting progression in her skin lesions including: Nose, face.  Using topical tacrolimus as needed per her dermatologist.  No other history of other organ involvement.  Was taking leflunomide at the time with progressive arthralgias and new skin lesions.  Suspected flare of symptoms as being off dapsone versus viral syndrome versus due to her multiple boils.  Recommended dapsone 100 mg daily and a Medrol dose taper.  Was recommended to continue leflunomide.  Possible swab to one of the lesions to rule out MRSA.    Patient presents as a new consult today to establish care.  States she stopped hydroxychloroquine about a year ago and has tried multiple different DMARDs but is unsure regarding whether she experienced side effects and why medications were  stopped/modified.  Had a reportedly normal eye exam in January.  Was prescribed a prednisone taper x 5 days with improvement of her polyarthralgias.  Notes morning stiffness as well as associated joint pain with swelling.  ROS also positive for frequent chills, hair loss, episodic ulcers in her nose/mouth, as well as burning/itching sensation in the sun.  Also notes persistence of the skin lesions which can affect her armpits, under her breasts, groin.  No measured fevers, history of uveitis/iritis, pleurisy, Raynaud's phenomenon.  Notes sicca symptoms.  Family history notable for father with RA.      Current Medications:  PO Prednisone 20 mg every day x5d- helpful    Medication History:  Naproxen 500 mg twice daily    Dapsone-ineffective  Hydroxychloroquine 200 mg twice daily  Pimecrolimus  PO Methotrexate (can't remember side effects) > Subcutaneous methotrexate 20 mg once weekly-insurance issues per notes. Patient noting skin burning   Leflunomide 20 mg daily-polyarthralgias, uncontrolled skin disease    Interval History:   See Above    HISTORY:  Past Medical History:    Acute bilateral low back pain    Anemia    Asthma (HCC)    Backache    Bronchitis    Effects of hunger    Essential hypertension    Glaucoma    Hypokalemia    Interscapular pain    Last Assessment & Plan:    Formatting of this note might be different from the original.   Patient reports pain is a 2/10 today.      Iron deficiency anemia    Lupus (HCC)    Neck pain    Last Assessment & Plan:    Formatting of this note might be different from the original.   Patient reports pain is a 2/10 today.      Right otitis media    Slow transit constipation    Type 2 diabetes mellitus without complication (HCC)      Social Hx Reviewed   Family Hx Reviewed     Medications (Active prior to today's visit):  Current Outpatient Medications   Medication Sig Dispense Refill    predniSONE 20 MG Oral Tab Take 1 tablet (20 mg total) by mouth daily for 5 days. 5 tablet  0    naproxen 500 MG Oral Tab Take 1 tablet (500 mg total) by mouth 2 (two) times daily as needed.      carvedilol 12.5 MG Oral Tab Take 1 tablet (12.5 mg total) by mouth 2 (two) times daily with meals.      latanoprost 0.005 % Ophthalmic Solution Place 1 drop into the left eye nightly.      spironolactone 25 MG Oral Tab Take 1 tablet (25 mg total) by mouth daily.      LORazepam 0.5 MG Oral Tab Take 1 tablet (0.5 mg total) by mouth daily as needed for Anxiety.      estradiol 1 MG Oral Tab Take 1 tablet (1 mg total) by mouth daily.      Calcium Citrate-Vitamin D 200-6.25 MG-MCG Oral Tab Take 1 tablet by mouth daily.      docusate sodium 100 MG Oral Cap Take 1 capsule (100 mg total) by mouth 2 (two) times daily.      ferrous sulfate 325 (65 FE) MG Oral Tab EC Take 1 tablet (325 mg total) by mouth daily with breakfast.      Multiple Vitamins-Minerals (MULTI-VITAMIN/MINERALS) Oral Tab Take 1 tablet by mouth daily.      acetaminophen 325 MG Oral Tab Take 1 tablet (325 mg total) by mouth every 6 (six) hours as needed for Pain.      polyethylene glycol, PEG 3350, 17 g Oral Powd Pack Take 17 g by mouth daily. 10 each 3    VENTOLIN  (90 Base) MCG/ACT Inhalation Aero Soln INHALE 2 PUFFS PO INTO THE LUNGS EVERY FOUR HOURS      amLODIPine 5 MG Oral Tab       ergocalciferol 1.25 MG (04251 UT) Oral Cap Take 1 capsule (50,000 Units total) by mouth once a week.      furosemide 20 MG Oral Tab       losartan 100 MG Oral Tab Take 1 tablet (100 mg total) by mouth daily.      montelukast 10 MG Oral Tab Take 1 tablet (10 mg total) by mouth daily.      OZEMPIC, 2 MG/DOSE, 8 MG/3ML Subcutaneous Solution Pen-injector       pantoprazole 40 MG Oral Tab EC Take 1 tablet (40 mg total) by mouth 2 (two) times daily before meals. 180 tablet 0    amoxicillin 500 MG Oral Tab Take 1 tablet (500 mg total) by mouth 3 (three) times daily. (Patient not taking: Reported on 5/21/2024)      PEG 3350-KCl-Na Bicarb-NaCl (TRILYTE) 420 g Oral Recon Soln  Take prep as directed by gastro office. May substitute with Trilyte/generic equivalent if needed. 1 each 0    lamoTRIgine 150 MG Oral Tab  (Patient not taking: Reported on 5/21/2024)         Allergies:  No Known Allergies      ROS:   Review of Systems   Constitutional:  Negative for chills and fever.   HENT:  Negative for congestion, hearing loss, mouth sores, nosebleeds and trouble swallowing.    Eyes:  Negative for photophobia, pain, redness and visual disturbance.   Respiratory:  Negative for cough and shortness of breath.    Cardiovascular:  Negative for chest pain, palpitations and leg swelling.   Gastrointestinal:  Negative for abdominal pain, blood in stool, diarrhea and nausea.   Endocrine: Negative for cold intolerance and heat intolerance.   Genitourinary:  Negative for dysuria, frequency and hematuria.   Musculoskeletal:  Positive for arthralgias and joint swelling. Negative for back pain, gait problem, myalgias, neck pain and neck stiffness.   Skin:  Positive for rash. Negative for color change.   Neurological:  Negative for dizziness, weakness, numbness and headaches.   Psychiatric/Behavioral:  Negative for confusion and dysphoric mood.         PHYSICAL EXAM:     Constitutional:  Well developed, Well nourished, No acute distress  HENT:  Normocephalic, Atraumatic, Bilateral external ears normal, Oropharynx moist, No oral exudates.  Neck: Normal range of motion, No tenderness, Supple, No stridor.  Eyes:  PERRL, EOMI, Conjunctiva normal, No discharge.  Respiratory:  Normal breath sounds, No respiratory distress, No wheezing.  Cardiovascular:  Normal heart rate, Normal rhythm, No murmurs, No rubs, No gallops.  GI:  Bowel sounds normal, Soft, No tenderness, No masses, No pulsatile masses.  : No CVA tenderness.  Musculoskeletal:  A comprehensive 28 count joint exam was done and was negative for swelling or tenderness except as noted. Inspections for misalignment, asymmetry, crepitation, defects, tenderness,  masses, nodules, effusions, range of motion, and stability in the upper and lower extremities bilaterally are all normal unless noted.           Integument:  Warm, Dry, No erythema, No rash.  Lymphatic:  No lymphadenopathy noted.  Neurologic:  Alert & oriented x 3, Normal motor function, Normal sensory function, No focal deficits noted.  Psychiatric:  Affect normal, Judgment normal, Mood normal.    LABS:     Lab work reviewed and notable for:    3/17/2024:  CMP with normal renal function, borderline alk phos 109 other LFTs WNL, no gamma gap noted  CBC without cytopenias or leukocytosis    2/14/2024:  dsDNA negative by crithidia IFA  C3 138 WNL, C4 40 WNL  CRP less than 1, ESR 43 (high)    11/10/2023:  UA negative blood/10 protein    3/21/2023:  G6PD 18 WNL  Imaging:     N/A

## 2024-05-23 LAB
CCP IGG SERPL-ACNC: 2.2 U/ML (ref 0–6.9)
M TB IFN-G CD4+ T-CELLS BLD-ACNC: 0.09 IU/ML
M TB TUBERC IFN-G BLD QL: NEGATIVE
M TB TUBERC IGNF/MITOGEN IGNF CONTROL: >10 IU/ML
NUCLEAR IGG TITR SER IF: POSITIVE {TITER}
QFT TB1 AG MINUS NIL: -0.01 IU/ML
QFT TB2 AG MINUS NIL: -0.01 IU/ML

## 2024-05-24 LAB
ANA NUCLEOLAR TITR SER IF: 80 {TITER}
DSDNA AB TITR SER: <10 {TITER}

## 2024-05-29 ENCOUNTER — APPOINTMENT (OUTPATIENT)
Dept: PHYSICAL THERAPY | Age: 54
End: 2024-05-29
Payer: COMMERCIAL

## 2024-05-31 LAB
CENTROMERE IGG SER-ACNC: <0.4 U/ML
ENA JO1 AB SER IA-ACNC: <0.3 U/ML
ENA RNP IGG SER IA-ACNC: 0.5 U/ML
ENA SCL70 IGG SER IA-ACNC: 0.8 U/ML
ENA SM IGG SER IA-ACNC: <0.7 U/ML
ENA SS-A IGG SER IA-ACNC: <0.4 U/ML
ENA SS-B IGG SER IA-ACNC: <0.4 U/ML
U1 SNRNP IGG SER IA-ACNC: 0.7 U/ML

## 2024-07-03 RX ORDER — PANTOPRAZOLE SODIUM 40 MG/1
40 TABLET, DELAYED RELEASE ORAL
Qty: 180 TABLET | Refills: 0 | Status: SHIPPED | OUTPATIENT
Start: 2024-07-03

## 2024-07-03 NOTE — TELEPHONE ENCOUNTER
Requested Prescriptions     Pending Prescriptions Disp Refills    PANTOPRAZOLE 40 MG Oral Tab EC [Pharmacy Med Name: PANTOPRAZOLE 40MG TABLETS] 180 tablet 0     Sig: TAKE 1 TABLET(40 MG) BY MOUTH TWICE DAILY BEFORE MEALS        LOV     5/6/2024      LR   4/1/2024      Routed to office on call    Hyperlipidemia

## 2024-07-15 ENCOUNTER — TELEPHONE (OUTPATIENT)
Dept: RHEUMATOLOGY | Facility: CLINIC | Age: 54
End: 2024-07-15

## 2024-07-15 ENCOUNTER — TELEPHONE (OUTPATIENT)
Dept: ORTHOPEDICS CLINIC | Facility: CLINIC | Age: 54
End: 2024-07-15

## 2024-07-15 RX ORDER — PREDNISONE 5 MG/1
5 TABLET ORAL DAILY PRN
Qty: 90 TABLET | Refills: 1 | Status: SHIPPED | OUTPATIENT
Start: 2024-07-15

## 2024-07-15 NOTE — TELEPHONE ENCOUNTER
S/w patient- She states that she thinks she has chronic arthritis in her left hand. She states that pain has gotten significantly worse over the last week. She states that she saw different doctor a few years ago and that they gave her cortisone injection. I offered appointment at 3pm on 7/17/24. She accepted and had no other questions at this time. She does not have xrays and I informed her that she will need to have a new set of xrays. She was agreeable to plan

## 2024-07-15 NOTE — TELEPHONE ENCOUNTER
Patient stating she is having pain on her left hand and is radiating to her arm and would like to know if Dr Su can see her sooner than august because she will be traveling this Friday.Please advise

## 2024-07-15 NOTE — TELEPHONE ENCOUNTER
Future Appointments   Date Time Provider Department Center   7/17/2024  9:30 AM [facility]   7/17/2024  3:00 PM Oliver Su MD [facility]   7/31/2024 10:00 AM [facility]ble[facility]   8/12/2024  9:20 AM Oliver Su MD [facility]   8/21/2024 12:30 PM STEARNS, PROCEDURE [facility] None     LOV: 5/21/24  Last Refilled:  Labs:  Component      Latest Ref Rn 5/21/2024   Anti-SSA Antibody, IGG      <7 U/mL <0.4    Anti-SSB Antibody, IGG      <7 U/mL <0.4    Anti-Smith Antibody, IGG      <7 U/mL <0.7    Anti-U1RNP Antibody, IGG      <5 U/mL 0.7    Anti-RNP70 Antibody, IGG      <7 U/mL 0.5    Anti-Centromere Antibody, IGG      <7 U/mL <0.4    Anti-SCL70 Antibody, IGG      <7 U/mL 0.8    Anti-Felecia-1 Antibody, IGG      <7 U/mL <0.3    Quantiferon TB NIL      IU/mL 0.09    Quantiferon-TB1 Minus NIL      IU/mL -0.01    Quantiferon-TB2 Minus NIL      IU/mL -0.01    Quantiferon TB Mitogen minus NIL      IU/mL >10.00    Quantiferon TB Result      Negative  Negative    HEPATITIS A AB, IGM      Nonreactive   Nonreactive    HBc IgM AB      Nonreactive   Nonreactive    HBSAg Screen      Nonreactive   Nonreactive    HCV AB      Nonreactive   Nonreactive    SHARAD Titer/Pattern      <80  80 !    Reviewed By: John Carreno M.D.    C-Citrullinated Peptide IgG AB      0.0 - 6.9 U/mL 2.2    SHARAD SCREEN WITH REFLEX (S)      Negative  Positive !    RHEUMATOID FACTOR      <14 IU/mL <10    C-REACTIVE PROTEIN      <1.00 mg/dL <0.40    SED RATE      0 - 30 mm/Hr 41 (H)    COMPLEMENT C3      90.0 - 170.0 mg/dL 111.9    COMPLEMENT C4      12.0 - 36.0 mg/dL 27.8    Anti Double Strand DNA      <10  <10       Legend:  ! Abnormal  (H) High      PLAN:  -Resume hydroxychloroquine 400 mg daily (less than 5 mg/kg).  Discussed with patient can divide up doses and to 200 mg twice daily if GI upset.       -Discussed with patient side effects of Plaquenil which include but are not  limited to retinopathy skin changes, blood abnormalities, hepatotoxicity, cardiotoxicity, neuro changes such as dizziness, fatigue, irritability, myopathy.  Pending patient response, patient will require annual Plaquenil eye exams if continued on chronic Plaquenil.  -P.o. prednisone taper x 12 days.  Afterwards, patient can take up to 5 mg daily with breakfast as needed for breakthrough pain.  - Referral given for dermatology and ophthalmology  - Consult/evaluation communicated with referring physician/provider.     I will MyChart patient on receipt of above results.  Otherwise, Pt will f/u in 2-3 months.     Isela Trimble DO  5/21/2024   11:21 AM

## 2024-07-15 NOTE — TELEPHONE ENCOUNTER
Patient is requesting a medication refill     Prednisone    [facility] in [facility] confirmed preferred

## 2024-07-16 ENCOUNTER — TELEPHONE (OUTPATIENT)
Dept: RHEUMATOLOGY | Facility: CLINIC | Age: 54
End: 2024-07-16

## 2024-07-16 NOTE — TELEPHONE ENCOUNTER
Returned pt call; verified name and . Informed pt Prednisone refill was sent on 7/15/24 to [facility] in [place].

## 2024-07-16 NOTE — TELEPHONE ENCOUNTER
Patient returning call from telephone encounter yesterday about refill request for prednisone.     Unable to reach Dr. Trimble's nurse as we were close to end of day and office was unavailable.    Please call patient back again tomorrow.

## 2024-07-17 ENCOUNTER — TELEPHONE (OUTPATIENT)
Dept: INTERNAL MEDICINE CLINIC | Facility: CLINIC | Age: 54
End: 2024-07-17

## 2024-07-17 ENCOUNTER — OFFICE VISIT (OUTPATIENT)
Dept: INTERNAL MEDICINE CLINIC | Facility: CLINIC | Age: 54
End: 2024-07-17

## 2024-07-17 ENCOUNTER — LAB ENCOUNTER (OUTPATIENT)
Dept: LAB | Age: 54
End: 2024-07-17
Payer: COMMERCIAL

## 2024-07-17 VITALS
DIASTOLIC BLOOD PRESSURE: 80 MMHG | OXYGEN SATURATION: 98 % | HEART RATE: 82 BPM | BODY MASS INDEX: 47.8 KG/M2 | SYSTOLIC BLOOD PRESSURE: 138 MMHG | HEIGHT: 64 IN | WEIGHT: 280 LBS

## 2024-07-17 DIAGNOSIS — Z86.39 HISTORY OF DIABETES MELLITUS, TYPE II: ICD-10-CM

## 2024-07-17 DIAGNOSIS — I10 BENIGN ESSENTIAL HYPERTENSION: ICD-10-CM

## 2024-07-17 DIAGNOSIS — E66.01 OBESITY, CLASS III, BMI 40-49.9 (MORBID OBESITY) (HCC): Primary | ICD-10-CM

## 2024-07-17 DIAGNOSIS — M79.641 RIGHT HAND PAIN: ICD-10-CM

## 2024-07-17 LAB
EST. AVERAGE GLUCOSE BLD GHB EST-MCNC: 111 MG/DL (ref 68–126)
HBA1C MFR BLD: 5.5 % (ref ?–5.7)

## 2024-07-17 PROCEDURE — 99214 OFFICE O/P EST MOD 30 MIN: CPT

## 2024-07-17 PROCEDURE — 83036 HEMOGLOBIN GLYCOSYLATED A1C: CPT

## 2024-07-17 PROCEDURE — 36415 COLL VENOUS BLD VENIPUNCTURE: CPT

## 2024-07-17 PROCEDURE — 3079F DIAST BP 80-89 MM HG: CPT

## 2024-07-17 PROCEDURE — 3008F BODY MASS INDEX DOCD: CPT

## 2024-07-17 PROCEDURE — 3075F SYST BP GE 130 - 139MM HG: CPT

## 2024-07-17 RX ORDER — TIRZEPATIDE 2.5 MG/.5ML
2.5 INJECTION, SOLUTION SUBCUTANEOUS WEEKLY
Qty: 2 ML | Refills: 0 | Status: SHIPPED | OUTPATIENT
Start: 2024-07-17 | End: 2024-08-08

## 2024-07-17 NOTE — PROGRESS NOTES
Subjective:   Damari Waterman is a 54 year old female who presents for Follow - Up     Having pain \"all over\", took prednisone last night and this helps a lot   Will be getting an injection today with Dr. Su and will follow up with Dr. Trimble at the end of the month  Took a long time to get amlodipine refilled and has been off it for about a month and noticed while being off it her left ankle has not been swollen anymore. She picked up the refill but wondering if she still needs to be on this as she is worried about her ankle swelling again.    Has been on Ozempic since around January and starting weight was 287lbs so only about 7lb weight loss in 6-7 months   Has been walking a little more but no dedicated exercise  Cooks at home relatively healthy, uses cabbage as her main vegetable  24 hour food recall:  Breakfast- sandwich sausage and egg and a coffee  Lunch - skipped   Dinner - 4 chicken wings   This morning sausage and egg mcmuffin from [employer]    Does not monitor BP at home  Denies headaches, dizziness, chest pain, vision changes    History/Other:    Chief Complaint Reviewed and Verified  No Further Nursing Notes to   Review  Tobacco Reviewed  Allergies Reviewed  Medications Reviewed    Problem List Reviewed  Medical History Reviewed  Surgical History   Reviewed  OB Status Reviewed  Family History Reviewed         Tobacco:  She has never smoked tobacco.    Current Outpatient Medications   Medication Sig Dispense Refill    Tirzepatide-Weight Management (ZEPBOUND) 2.5 MG/0.5ML Subcutaneous Solution Auto-injector Inject 2.5 mg into the skin once a week for 4 doses. 2 mL 0    predniSONE 5 MG Oral Tab Take 1 tablet (5 mg total) by mouth daily as needed. Take Prednisone 5 mg daily as needed for pain in the morning with food. 90 tablet 1    PANTOPRAZOLE 40 MG Oral Tab EC TAKE 1 TABLET(40 MG) BY MOUTH TWICE DAILY BEFORE MEALS 180 tablet 0    hydroxychloroquine 200 MG Oral Tab Take 2 tablets (400  mg total) by mouth daily. 180 tablet 2    naproxen 500 MG Oral Tab Take 1 tablet (500 mg total) by mouth 2 (two) times daily as needed.      carvedilol 12.5 MG Oral Tab Take 1 tablet (12.5 mg total) by mouth 2 (two) times daily with meals.      latanoprost 0.005 % Ophthalmic Solution Place 1 drop into the left eye nightly.      spironolactone 25 MG Oral Tab Take 1 tablet (25 mg total) by mouth daily.      LORazepam 0.5 MG Oral Tab Take 1 tablet (0.5 mg total) by mouth daily as needed for Anxiety.      estradiol 1 MG Oral Tab Take 1 tablet (1 mg total) by mouth daily.      Calcium Citrate-Vitamin D 200-6.25 MG-MCG Oral Tab Take 1 tablet by mouth daily.      docusate sodium 100 MG Oral Cap Take 1 capsule (100 mg total) by mouth 2 (two) times daily.      ferrous sulfate 325 (65 FE) MG Oral Tab EC Take 1 tablet (325 mg total) by mouth daily with breakfast.      Multiple Vitamins-Minerals (MULTI-VITAMIN/MINERALS) Oral Tab Take 1 tablet by mouth daily.      acetaminophen 325 MG Oral Tab Take 1 tablet (325 mg total) by mouth every 6 (six) hours as needed for Pain.      polyethylene glycol, PEG 3350, 17 g Oral Powd Pack Take 17 g by mouth daily. 10 each 3    PEG 3350-KCl-Na Bicarb-NaCl (TRILYTE) 420 g Oral Recon Soln Take prep as directed by gastro office. May substitute with Trilyte/generic equivalent if needed. 1 each 0    VENTOLIN  (90 Base) MCG/ACT Inhalation Aero Soln INHALE 2 PUFFS PO INTO THE LUNGS EVERY FOUR HOURS      amLODIPine 5 MG Oral Tab       ergocalciferol 1.25 MG (25582 UT) Oral Cap Take 1 capsule (50,000 Units total) by mouth once a week.      furosemide 20 MG Oral Tab       losartan 100 MG Oral Tab Take 1 tablet (100 mg total) by mouth daily.      montelukast 10 MG Oral Tab Take 1 tablet (10 mg total) by mouth daily.       Review of Systems:  Review of Systems  10 point review of systems otherwise negative with the exception of HPI and assessment and plan    Objective:   /80   Pulse 82   Ht  5' 4\" (1.626 m)   Wt 280 lb (127 kg)   SpO2 98%   BMI 48.06 kg/m²  Estimated body mass index is 48.06 kg/m² as calculated from the following:    Height as of this encounter: 5' 4\" (1.626 m).    Weight as of this encounter: 280 lb (127 kg).  Physical Exam  Vitals reviewed.   Constitutional:       General: She is not in acute distress.     Appearance: Normal appearance. She is well-developed.   Cardiovascular:      Rate and Rhythm: Normal rate and regular rhythm.      Heart sounds: Normal heart sounds.   Pulmonary:      Effort: Pulmonary effort is normal.      Breath sounds: Normal breath sounds.   Skin:     General: Skin is warm and dry.   Neurological:      Mental Status: She is alert and oriented to person, place, and time.       Assessment & Plan:   1. Obesity, Class III, BMI 40-49.9 (morbid obesity) ([facility]) (Primary)  -     Zepbound; Inject 2.5 mg into the skin once a week for 4 doses.  Dispense: 2 mL; Refill: 0  Switch from Ozempic to Zepbound   Increase exercise and discussed importance of healthy diet low in simple carbohydrates and saturated fats for weight loss   Emphasize home cooked meals, avoid fried or fatty foods   She is interested in changing to tirzepatide (Zepbound). Discussed pros and cons and potential side effects. She denies personal or family history of medullary thyroid carcinoma. Advised that diet and exercise are an important adjunct to this medication for weight loss. Will send medication and she will follow up in 4 weeks.    2. Benign essential hypertension  Take 2.5mg (half a tablet) of amlodipine for the next 1-2 weeks. Please monitor blood pressure daily during this time and send a log of your readings to the office.   3. History of diabetes mellitus, type II  -     Hemoglobin A1C; Future; Expected date: 07/17/2024    MANUEL Warren, 7/17/2024, 9:38 AM

## 2024-07-17 NOTE — PATIENT INSTRUCTIONS
Take 2.5mg (half a tablet) of amlodipine for the next 1-2 weeks. Please monitor blood pressure daily during this time and send a log of your readings to the office.     Today your provider asked you to monitor your blood pressure at home.  Your provider would like you to view the following links:    A list of recommended blood pressure cuffs:    https://www.validatebp.org/  An instructional video (available in Grenadian & English) on how to take your own blood pressure:   https://targetbp.org/tools_downloads/self-measured-blood-pressure-video/

## 2024-07-17 NOTE — TELEPHONE ENCOUNTER
Pharmacy is requesting PA      Current Outpatient Medications:       Tirzepatide-Weight Management (ZEPBOUND) 2.5 MG/0.5ML Subcutaneous Solution Auto-injector, Inject 2.5 mg into the skin once a week for 4 doses., Disp: 2 mL, Rfl: 0

## 2024-07-17 NOTE — TELEPHONE ENCOUNTER
Patient has an appointment today 7/17 with Dr. Su for left hand pain and she needs a referral for the visit. Please enter a referral for left hand pain.

## 2024-07-18 NOTE — TELEPHONE ENCOUNTER
Prior authorization for zepbound was done through [employer]. It can take 1-5 business days for a decision to come back

## 2024-07-19 NOTE — TELEPHONE ENCOUNTER
Tirzepatide was not covered by her insurance. I will refer her to the weight loss specialist for further management as she has not had success on ozempic.   She should call [phone] or go online and schedule with Dr. Mandy Wilcox who is our weight loss specialist

## 2024-07-30 ENCOUNTER — TELEPHONE (OUTPATIENT)
Dept: RHEUMATOLOGY | Facility: CLINIC | Age: 54
End: 2024-07-30

## 2024-07-30 DIAGNOSIS — M06.4 INFLAMMATORY POLYARTHRITIS (HCC): Primary | ICD-10-CM

## 2024-07-30 DIAGNOSIS — Z79.899 LONG-TERM USE OF HYDROXYCHLOROQUINE: Primary | ICD-10-CM

## 2024-07-30 DIAGNOSIS — L93.0 DISCOID LUPUS: ICD-10-CM

## 2024-07-30 NOTE — TELEPHONE ENCOUNTER
Patient called in and said  gave her a referral for ophthalmologist Dr.Robert Altman and he is not accepting new patient's. Patient would like  to recommend her another ophthalmologist. Please advise

## 2024-07-30 NOTE — TELEPHONE ENCOUNTER
Pt has upcoming appt with Aleyda,  on 7/31/24.  No referral on file and pt was notified   Please advise

## 2024-07-31 ENCOUNTER — OFFICE VISIT (OUTPATIENT)
Dept: OBGYN CLINIC | Facility: CLINIC | Age: 54
End: 2024-07-31

## 2024-07-31 VITALS
HEIGHT: 64 IN | SYSTOLIC BLOOD PRESSURE: 109 MMHG | WEIGHT: 285.38 LBS | BODY MASS INDEX: 48.72 KG/M2 | DIASTOLIC BLOOD PRESSURE: 72 MMHG

## 2024-07-31 DIAGNOSIS — Z01.419 ENCOUNTER FOR GYNECOLOGICAL EXAMINATION WITHOUT ABNORMAL FINDING: Primary | ICD-10-CM

## 2024-07-31 DIAGNOSIS — Z11.3 ROUTINE SCREENING FOR STI (SEXUALLY TRANSMITTED INFECTION): ICD-10-CM

## 2024-07-31 DIAGNOSIS — E89.41 SURGICAL MENOPAUSE, SYMPTOMATIC: ICD-10-CM

## 2024-07-31 PROBLEM — E66.01 MORBID OBESITY (HCC): Status: ACTIVE | Noted: 2020-05-15

## 2024-07-31 PROCEDURE — 99396 PREV VISIT EST AGE 40-64: CPT | Performed by: ADVANCED PRACTICE MIDWIFE

## 2024-07-31 PROCEDURE — 3078F DIAST BP <80 MM HG: CPT | Performed by: ADVANCED PRACTICE MIDWIFE

## 2024-07-31 PROCEDURE — 3008F BODY MASS INDEX DOCD: CPT | Performed by: ADVANCED PRACTICE MIDWIFE

## 2024-07-31 PROCEDURE — 3074F SYST BP LT 130 MM HG: CPT | Performed by: ADVANCED PRACTICE MIDWIFE

## 2024-07-31 RX ORDER — ESTRADIOL 1 MG/1
1 TABLET ORAL DAILY
Qty: 90 TABLET | Refills: 1 | Status: SHIPPED | OUTPATIENT
Start: 2024-07-31

## 2024-07-31 NOTE — PROGRESS NOTES
Chief Complaint   Patient presents with    Annual        HPI:   Damari is 54 year old No obstetric history on file., here today for routine annual gyn exam and requesting refill of her Estradial that she takes for hot flash symptoms that started after her complete hysterectomy last year. States that struggled with symptoms for about 3 months after the surgery and then was started on the Estradial which has relieved the symptoms. She has new insurance so couldn't go back to same doctor who prescribed it. Sees PCP in [place] who manages her complex health needs including hypertension which is well-controlled today.    Routine STI screening: chip  Reports had mammogram within the last few months at . Results in CareEverywhere  Significant Gyn history: hysterectomy was for a growing fibroid tumor causing heavy bleeding. Denies H/O abnormal paps    Note: This is a gyn only visit. Pt has PCP and is referred back to PCP for care of any non-gyn concerns listed above in the Problem List.    HISTORY:  Past Medical History:    Acute bilateral low back pain    Anemia    Asthma (HCC)    Bronchitis    Essential hypertension    Glaucoma    Hypokalemia    Interscapular pain    Last Assessment & Plan:    Formatting of this note might be different from the original.   Patient reports pain is a 2/10 today.      Lupus (HCC)    Neck pain    Last Assessment & Plan:    Formatting of this note might be different from the original.   Patient reports pain is a 2/10 today.      Right otitis media    Slow transit constipation    Type 2 diabetes mellitus without complication (HCC)      Hx Prior Abnormal Pap: No    Past Surgical History:   Procedure Laterality Date    Lap gastric bypass/daniel-en-y      Total abdom hysterectomy        History reviewed. No pertinent family history.   Social History:   Social History     Socioeconomic History    Marital status: Single   Tobacco Use    Smoking status: Never     Passive exposure: Never     Smokeless tobacco: Never   Vaping Use    Vaping status: Never Used   Substance and Sexual Activity    Alcohol use: Never    Drug use: Never     Social Determinants of Health     Food Insecurity: Low Risk  (5/25/2023)    Received from [facility]    Food Insecurity     Have there been times that your food ran out, and you didn't have money to get more?: No     Are there times that you worry that this might happen?: No   Transportation Needs: Low Risk  (5/25/2023)    Received from [facility]    Transportation Needs     Do you have trouble getting transportation to medical appointments?: No          Depression Screening (PHQ-2/PHQ-9): Over the LAST 2 WEEKS   Little interest or pleasure in doing things (over the last two weeks)?: Not at all  Little interest or pleasure in doing things: Not at all    Feeling down, depressed, or hopeless (over the last two weeks)?: Not at all  Feeling down, depressed, or hopeless: Not at all    PHQ-2 SCORE: 0  PHQ-2 SCORE: 0        Immunization History   Administered Date(s) Administered    >=3 YRS TRI  MULTIDOSE VIAL (16133) FLU CLINIC 11/18/2014, 09/29/2015    Covid-19 Vaccine Moderna 100 mcg/0.5 ml 03/19/2021, 04/19/2021    Covid-19 Vaccine Moderna 50 Mcg/0.25 Ml 01/11/2022    FLUZONE 6 months and older PFS 0.5 ml (14244) 11/01/2017, 02/21/2019, 10/23/2019, 12/09/2020, 10/11/2021, 09/14/2022, 11/10/2023    TDAP 01/28/2020    Tb Intradermal Test 11/26/2018    Zoster Vaccine Recombinant Adjuvanted (Shingrix) 08/29/2023, 11/10/2023        Medications (Active prior to today's visit):  Current Outpatient Medications   Medication Sig Dispense Refill    estradiol 1 MG Oral Tab Take 1 tablet (1 mg total) by mouth daily. 90 tablet 1    Tirzepatide-Weight Management (ZEPBOUND) 2.5 MG/0.5ML Subcutaneous Solution Auto-injector Inject 2.5 mg into the skin once a week for 4 doses. 2 mL 0     predniSONE 5 MG Oral Tab Take 1 tablet (5 mg total) by mouth daily as needed. Take Prednisone 5 mg daily as needed for pain in the morning with food. 90 tablet 1    PANTOPRAZOLE 40 MG Oral Tab EC TAKE 1 TABLET(40 MG) BY MOUTH TWICE DAILY BEFORE MEALS 180 tablet 0    hydroxychloroquine 200 MG Oral Tab Take 2 tablets (400 mg total) by mouth daily. 180 tablet 2    naproxen 500 MG Oral Tab Take 1 tablet (500 mg total) by mouth 2 (two) times daily as needed.      carvedilol 12.5 MG Oral Tab Take 1 tablet (12.5 mg total) by mouth 2 (two) times daily with meals.      latanoprost 0.005 % Ophthalmic Solution Place 1 drop into the left eye nightly.      spironolactone 25 MG Oral Tab Take 1 tablet (25 mg total) by mouth daily.      Calcium Citrate-Vitamin D 200-6.25 MG-MCG Oral Tab Take 1 tablet by mouth daily.      ferrous sulfate 325 (65 FE) MG Oral Tab EC Take 1 tablet (325 mg total) by mouth daily with breakfast.      Multiple Vitamins-Minerals (MULTI-VITAMIN/MINERALS) Oral Tab Take 1 tablet by mouth daily.      acetaminophen 325 MG Oral Tab Take 1 tablet (325 mg total) by mouth every 6 (six) hours as needed for Pain.      amLODIPine 5 MG Oral Tab       ergocalciferol 1.25 MG (16806 UT) Oral Cap Take 1 capsule (50,000 Units total) by mouth once a week.      furosemide 20 MG Oral Tab       losartan 100 MG Oral Tab Take 1 tablet (100 mg total) by mouth daily.      montelukast 10 MG Oral Tab Take 1 tablet (10 mg total) by mouth daily.      docusate sodium 100 MG Oral Cap Take 1 capsule (100 mg total) by mouth 2 (two) times daily. (Patient not taking: Reported on 7/31/2024)      polyethylene glycol, PEG 3350, 17 g Oral Powd Pack Take 17 g by mouth daily. (Patient not taking: Reported on 7/31/2024) 10 each 3    PEG 3350-KCl-Na Bicarb-NaCl (TRILYTE) 420 g Oral Recon Soln Take prep as directed by gastro office. May substitute with Trilyte/generic equivalent if needed. 1 each 0    VENTOLIN  (90 Base) MCG/ACT Inhalation Aero  Soln INHALE 2 PUFFS PO INTO THE LUNGS EVERY FOUR HOURS (Patient not taking: Reported on 7/31/2024)         Allergies:  No Known Allergies    ROS:  Review of Systems   Constitutional: Negative.    Genitourinary: Negative.      PHYSICAL EXAM:  Vitals:    07/31/24 1224   BP: 109/72     Physical Exam  Vitals reviewed.   Constitutional:       Appearance: Normal appearance.   HENT:      Head: Normocephalic.   Pulmonary:      Effort: Pulmonary effort is normal.   Neurological:      Mental Status: She is alert and oriented to person, place, and time.   Psychiatric:         Behavior: Behavior normal.         Thought Content: Thought content normal.         Judgment: Judgment normal.        Last pap: 12/2022 NILM/HPV negative  Last mammogram: 3/2024 WNL  Hysterectomy pathology: 5/2023   A.  Uterus, supracervical hysterectomy with bilateral fallopian tubes and ovaries:               Specimen 542.3 g.               Absent cervix.               Benign endocervical polyp.               Simple endometrial hyperplasia without atypia.               Left fallopian tube with a hydrosalpinx.               Normal right Fallopian tube.               Single separately received leiomyoma, 11.5 cm.               Normal bilateral ovaries, both with follicular cysts.     ASSESSMENT/PLAN:     Damari was seen today for annual.    Diagnoses and all orders for this visit:    Encounter for gynecological examination without abnormal finding  -     Pap and mammogram up to date and WNL  - Next pap due 12/2027  - Next mammogram due 3/2025    Surgical menopause, symptomatic  -     estradiol 1 MG Oral Tab; Take 1 tablet (1 mg total) by mouth daily.    Routine screening for STI (sexually transmitted infection)  -     HCV Antibody; Future  -     Hepatitis B Surface Antigen; Future  -     HIV AG AB Combo; Future  -     T PALLIDUM SCREENING CASCADE; Future  -     Chlamydia/Gc Amplification; Future  -     Trichomonas Vaginalis, CHRIS (Urethral/Urine);  Future       Next annual due: 1 year  Follow-up/Return to clinic: 6 months for menopausal symptom follow-up and evaluation of Estradiol dosing    Counseling:   Frequency of health screening and personal risks  Pap, SBE/CBE, mammography    Patient verbalized understanding, All questions answered. No barriers to learning identified

## 2024-07-31 NOTE — PATIENT INSTRUCTIONS
Why Have a Pap Test?  Early on, cervical changes don't cause symptoms. Often, the only way to know you have cervical changes is to do a Pap test. A Pap test can find these problems early, when they are easier to treat. Pap tests can also detect some infections of the cervix and vagina.  What is a Pap test?  A Pap test is a procedure that helps find changes in the cervix that may lead to cancer. The cervix is the part of the uterus that opens into the vagina. For this test, a small sample of cells is taken from the cervix. This is done in your healthcare provider’s office. The cells are then analyzed in a lab. A Pap test is a safe procedure. It takes just a few minutes and causes little or no discomfort.  The HPV connection  Human papillomavirus or HPV is a family of viruses that spread through skin contact. Certain types are almost always spread through sexual contact. Some HPV types cause genital warts (condyloma). But not all types of HPV cause visible symptoms. Certain types cause cell changes (dysplasia) in the cervix that can lead to cancer. In fact, HPV infection is the most important risk factor for cervical cancer. Healthcare providers can now test for HPV. Testing for HPV is often done with the Pap test. That’s why it’s important to have Pap tests as recommended by your healthcare provider. This helps ensure that any abnormal cells will be found and treated before they become cancerous.  Who should have a Pap test and HPV test?  Ask your healthcare provider when to start having Pap tests, whether you should have an HPV test done at the same time, and how often to have them. Follow these guidelines from the American Cancer Society for cervical cancer screening:  A first Pap test at age 21. And then every 3 years until age 29, HPV testing is not recommended during this time, though it may be done to follow-up on an abnormal Pap test.  Starting at age 30, the preferred testing is a Pap test done with an HPV  test every 5 years. This should be done until age 65. Another option for women in this 30 to 65 age group is to have just the Pap test done every 3 years.  You may need a different screening schedule if you are at high risk for cervical cancer. Risk factors include having HIV, a weak immune system, or exposure to the medicine JORGE while your mother was pregnant with you. Talk with your healthcare provider about the best schedule for you.  If you’re over 65 and have had regular screenings for the last 10 years with no abnormal results in the last 20 years, you may stop cervical cancer screening.  If you had a hysterectomy that included removing your cervix, you can stop screening unless the hysterectomy was done to treat cervical cancer or pre-cancer. If you still have your cervix after the hysterectomy, you should continue screening according to the above guidelines.  Routine testing does not need to be done each year. However, if your test is abnormal, your provider will let you know how often to be tested.   Women who have been vaccinated for HPV should still follow these guidelines.  If you have had cervical cancer, talk to your healthcare provider about the screening plan that's best for you.  [email or web address] last reviewed this educational content on 9/1/2017 © 2000-2020 The [employer]. [address] [place] [address], [place] [address]. All rights reserved. This information is not intended as a substitute for professional medical care. Always follow your healthcare professional's instructions.        Breast Health: Breast Self-Awareness  What is breast self-awareness?  Breast self-awareness is knowing how your breasts normally look and feel. Your breasts change as you go through different stages of your life. So it’s important to learn what is normal for your breasts. Knowing about your breasts helps you spot any changes in them right away. Tell your healthcare provider about any changes.   Why is breast  self-awareness important?   Many experts now say that women should focus on breast self-awareness instead of doing a breast self-examination (BSE). These experts include the American Cancer Society and the American Congress of Obstetricians and Gynecologists. Some experts even advise not teaching women to do a BSE. That’s because research hasn’t shown a clear benefit to doing BSEs.   Breast self-awareness is different than a BSE. It isn’t about following a certain method and schedule. It’s about knowing what's normal for your breasts. That way you can spot even small changes right away. If you see any changes, tell your healthcare provider.   Changes to look for  Call your healthcare provider if you find any changes in your breasts that worry you. These changes may be:   A lump  Nipple discharge other than breast milk, especially if it's bloody  Swelling  A change in size or shape  Skin changes, such as redness, thickening, or dimpling of the skin  Swollen lymph nodes in the armpit  Nipple problems, such as pain or redness  If you find a lump  Call your provider if you find lumpiness in one breast. Also call if you feel something different in the tissue or feel a definite lump. Sometimes lumpiness may be due to menstrual changes. But there may be reason for concern.   Your provider may want to see you right away if you have:   Nipple discharge that is bloody  Skin changes on your breast, such as dimpling or puckering  It’s okay to be upset if you find a lump. Be sure to call your provider right away. Remember that most breast lumps are benign. This means they are not cancer.   [email or web address] last reviewed this educational content on 5/1/2020 © 2000-2020 The [employer]. [address] [place] [address], [place] [address]. All rights reserved. This information is not intended as a substitute for professional medical care. Always follow your healthcare professional's instructions.        Understanding STIs    When it comes  to sex, nothing is risk-free. Any sexual contact with the penis, vagina, anus, or mouth can spread a sexually transmitted infection (STI). These include chlamydia, gonorrhea, herpes, HIV, and genital warts. STIs are also known as sexually transmitted diseases (STDs). The only sure way to prevent STIs is not having sex (abstinence). But there are ways to make sex safer. Use a latex condom each time you have sex. And choose your partner wisely.  Use condoms for safer sex  If you have sex, latex condoms provide the best protection against STIs. Latex condoms stop the exchange of body fluids that carry certain STIs. They also limit contact with affected skin. Be aware that a condom doesn’t cover all skin. So affected skin that isn't covered can still transfer disease. But you’re safer with a condom than without one. Use a condom even if you use other birth control. Birth control methods such as the pill or IUD help prevent pregnancy, but they don't protect against STIs.  Choose the right condom  Condoms made of latex prevent disease best. If you’re allergic to latex, use polyurethane condoms instead. Male condoms fit over the penis. Female condoms line the vagina. Before buying a condom, read the label to be sure it prevents disease. Some novelty condoms don’t.  The right lubricant helps  Buy lubricated condoms or use lubricant. This provides greater comfort and reduces the risk for condom breakage. Use only water-based lubricants. Don’t use oil, lotion, or petroleum jelly. They can weaken the condom, causing breakage. Also, you may want to choose lubricants without nonoxynol-9. This spermicide may cause irritation. It can raise the risk for certain STIs.  Use condoms correctly  For condoms to work, they must be used the right way. Keep these tips in mind:  Use a new latex condom each time you have sex. Slip the condom on the penis before any contact is made.  When ready to withdraw, hold the rim of the condom as the  penis pulls out. This prevents the condom from slipping off.  Check the expiration date before using a condom.  Don’t store condoms in places that can get hot, such as a car or a wallet that is carried in a back pocket.  Get to know your partner  Safer sex is a process. It involves getting to know your partner and making informed choices. Ask each other how many partners you have had in the past, and how many you have now. Find out if either of you has HIV or any other STI. If you decide to have sex, use a condom each time. Don’t stop using condoms unless you’re sure neither of you has other partners and you’ve both been tested to confirm you don’t have HIV or other STIs. Then stay free of disease by having sex only with each other (monogamy).  Keep your cool  Don’t let alcohol or drugs cloud your judgment. They could lead you to have sex with someone you wouldn’t have chosen if you were sober. Or you might forget to use a condom. If you do plan to have sex, keep a latex condom with you. Don’t wait until you’re in the heat of passion to try to find one.  Consider abstinence  The only way to be sure you won’t get an STI is to abstain from sex. Abstinence is a choice that many people make at some point in their life. Maybe you want to wait until you are sure you’re ready before you have sex. Maybe you’d like a break from the responsibilities of sex for a while. Or maybe you just want to know your partner better before taking the next step. Abstinence is a choice you can make now to protect your future.  [employer] last reviewed this educational content on 12/1/2018 © 2000-2020 The [employer]. [address] [place] [address], [place] [address]. All rights reserved. This information is not intended as a substitute for professional medical care. Always follow your healthcare professional's instructions.        Understanding Body Mass Index (BMI)   Body mass index (BMI) is a way to figure out your weight category. It  uses the ratio of your height to your weight. The BMI is a measure of your weight that is corrected for height. Knowing your BMI is a way to tell if you are at a healthy weight, are underweight, or overweight. The higher your BMI, the greater your risk for weight-related health problems.  What BMI means for adults  BMI below 18.5: Underweight  BMI 18.5 to 24.9: Healthy weight or ideal body weight   BMI 25 to 29.9: Overweight  BMI 30 and over: Obese  BMI 40 and over: Severe obesity        Find your BMI with an online BMI calculator tool, such as these from the CDC:  BMI calculator for adults  BMI calculator for children and teens  Using the BMI chart  To figure out your BMI, find your height and weight (or the numbers closest to them) on the table below. Follow each column of numbers to where your height and weight meet on the table. That is your BMI.    StayWell last reviewed this educational content on 9/1/2019  © 7362-0686 The [employer]. [address] [place] [address], [place] [address]. All rights reserved. This information is not intended as a substitute for professional medical care. Always follow your healthcare professional's instructions.        Obesity and Its Impact on Health  Obesity is a major health problem in the [place] and all over the world. It affects nearly 2 out of 5 [place] adults.  What is obesity?  Obesity is a term used to describe a body weight that is above a normal weight for a specific height. Obesity is measured by using BMI (body mass index). BMI is a formula that uses a person’s weight divided by their height. BMI may be used to screen for weight problems but it’s important to know that BMI does not diagnose health problems or a person’s body fat. A high BMI number can mean high body fat. A BMI between 18.5 and 25 is normal. A BMI between 25 and 30 falls within the overweight range. A BMI 30 or higher is within the obese range. A BMI of 40 or more is considered extreme or severe  obesity.  Why is obesity a problem?  Carrying too much weight can increase your risk for many serious health issues. These include problems with your heart, lungs, blood vessels, brain, and joints. Some of the problems that can happen include:  Heart and circulation problems. These include heart disease, high blood pressure, heart rhythm problems (atrial fibrillation), and stroke  Type 2 diabetes  Certain cancers, such as colon and breast cancer  Sleep apnea and other breathing problems.  Back or joint problems, such as osteoarthritis and gout  Digestive tract problems such as gallstones and GERD (gastroesophageal reflux disease)  Depression (with severe obesity)  Carrying too much weight can also affect your quality of life. And it can keep you from doing things you want or need to do.  How can you lower your risk for problems?  The key to lowering your risk for health problems from obesity is to manage your weight. If you are overweight or obese, the first step is to lose weight. Studies show that losing as little as 5 percent of your body weight is good for your health.  An important part of losing weight involves developing health habits and behaviors. Here are some tips:  Control how much you eat. Food is your body’s way to get energy (calories). But if you take in more calories than your body uses, you’ll gain weight. Know your eating habits and keep your portions reasonable.  Make healthy eating choices. Choose foods with many nutrients that give your body the energy it needs without adding extra pounds (kilograms). Also limit foods with added sugar and fats.  Be more active. Exercise burns calories, which can help you manage your weight. Increase your daily movement, add aerobic activity, and include strength training.  Talk with your healthcare provider. Ask about working with a dietitian, health , exercise physiologist, or mental health provider who can support and encourage you.     Adding exercise  to your day can help you control your weight.        If you have trouble losing weight, your healthcare provider may suggest medicines to help you. Weight loss (bariatric) surgery may also be an option for adults who have:  A BMI of 40 or more, or who are more than 100 pounds (45.4 kg) overweight  A BMI of 35 to less than 40 and a serious health problem such as type 2 diabetes, high blood pressure, or sleep apnea.  Not been able to stay at a healthy weight for a period of time in spite of efforts to lose weight through diet, exercise, or medicines  StayWell last reviewed this educational content on 3/1/2020  © 6065-4834 The [employer]. [address] [place] [address], [place] [address]. All rights reserved. This information is not intended as a substitute for professional medical care. Always follow your healthcare professional's instructions.        Exercise: Making the Most of Your Time  Your exercise goal is 30 minutes on most days. Aim for a total of 150 or more minutes a week. Not sure you can fit one 30-minute block of exercise time into your day? Split it up into shorter 10- and 15-minute blocks. Do this 2 to 3 times a day. You'll still get all of the benefits of exercise.    Use your whole body  Aerobic exercise works your heart and lungs. Some examples are walking, running, and cycling. Try adding a few other activities, too. This can help you strengthen and stretch many different muscles in your body.  Strengthen your:  Lower legs. Go up and down slowly on your toes, while you’re filing papers or washing dishes.  Upper legs. Lower yourself slowly into a chair without using your arms.  Stretch your:  Back. After you get up from a chair, place your palms on your low back and lean your upper body back.  Shoulders and chest. Lift your arms overhead and reach tall while waiting for your computer to warm up.  Lower legs. Raise your toes and press them against a wall (with your heel on the ground).  Find a few  extra minutes  Try these tips to add some extra exercise and activity to your day:  At work. Pick a lunch spot a few blocks away and walk there and back. Take a brisk walk on your break.  At home. Ride bikes with your kids. Use an exercise bike in the living room while you watch TV.  On errands. Park a few blocks away from where you need to go and walk there. Power-walk in malls by doing a fast lap or two before shopping.  At play. Go hiking with friends instead of sitting on a bench. Walk through a street fair instead of sitting in a movie.  Tips for sticking with it  Plan your activity by writing it on a calendar.  Find a sheila at work to walk with during a lunch break.  Take your kids with you on a short walk after dinner.  Post a reminder list of the benefits of being active where you can see it.  Set an alarm to tell you when it’s time for an activity break.   [employer] last reviewed this educational content on 3/1/2018  © 3852-5625 The [employer]. [address] [place] [address]. All rights reserved. This information is not intended as a substitute for professional medical care. Always follow your healthcare professional's instructions.        4 Steps for Eating Healthier  Changing the way you eat can improve your health. It can lower your cholesterol and blood pressure, and help you stay at a healthy weight. Your diet doesn’t have to be bland and boring to be healthy. Just watch your calories and follow these steps:    Step 1. Eat fewer unhealthy fats  Choose more fish and lean meats instead of fatty cuts of meat.  Skip butter and lard, and use less margarine.  Pass on foods that have palm, coconut, or hydrogenated oils.  Eat fewer high-fat dairy foods like cheese, ice cream, and whole milk.  Get a heart-healthy cookbook and try some low-fat recipes.    Step 2. Go light on salt  Keep the saltshaker off the table.  Limit high-salt ingredients, such as soy sauce, bouillon, and garlic  salt.  Instead of adding salt when cooking, season your food with herbs and flavorings. Try lemon, garlic, and onion, or salt-free herb seasonings.  Limit convenience foods, such as boxed or canned foods and restaurant food.  Read food labels and choose lower-sodium options.    Step 3. Limit sugar  Pause before you add sugars to pancakes, cereal, coffee, or tea. This includes white and brown table sugar, syrup, honey, and molasses. Cut your usual amount by half.  Use non-sugar sweeteners. Stevia, aspartame, and sucralose can satisfy a sweet tooth without adding calories.  Swap out sugar-filled soda and other drinks. Buy sugar-free or low-calorie beverages. Remember water is always the best choice.  Read labels and choose foods with less added sugar. Keep in mind that dairy foods and foods with fruit will have some natural sugar.  Cut the sugar in recipes by 1/3 to 1/2. Boost the flavor with extracts like almond, vanilla, or orange. Or add spices such as cinnamon or nutmeg.    Step 4. Eat more fiber  Eat fresh fruits and vegetables every day.  Boost your diet with whole grains. Go for oats, whole-grain rice, and bran.  Add beans and lentils to your meals.  Drink more water to match your fiber increase to help prevent constipation.    [employer] last reviewed this educational content on 6/1/2017  © 6388-6563 The [employer]. [address] [place] [address], [place] [address]. All rights reserved. This information is not intended as a substitute for professional medical care. Always follow your healthcare professional's instructions.        Eating Healthy on the Run     Many grocery stores stock pre-made salads for eating on the run.     Need to eat on the run? This often means grabbing \"junk\" or fast food full of fat, salt, sugar, and cholesterol. But being in a rush doesn't mean that you can't eat healthy.  \"Fast\" food made healthy  Try these ways to get good nutrition, fast.  Go to a grocery or convenience market  instead of a fast-food restaurant. Look for choices like sandwiches, yogurt, fresh fruit, and juices.  Buy precut, prepackaged fresh or frozen fruits and vegetables. You can use them for a snack, salad, smoothie, or stir-emerson.  Microwave a frozen dinner that has less than 15 grams (g) of fat and less than 800 milligrams (mg) of sodium. Complete the meal with a whole-grain roll, vegetables, and fresh fruit.  If you must have fast food, consider your options. Go for veggie burgers, broiled and skinless chicken breast sandwiches, or dinner salads with low-fat dressing. Avoid large (super-sized) portions.  Blot the extra oil from food with a napkin before you eat it.  Instead of french fries, choose a baked potato with salsa.  Tip  Fast-food restaurants often have printed nutrition information available. Ask for this information and look up your favorite items before you order.   [employer] last reviewed this educational content on 6/1/2017 © 2000-2020 The [employer]. [address] [place] [address]. All rights reserved. This information is not intended as a substitute for professional medical care. Always follow your healthcare professional's instructions.        Eating Healthy: Good Nutrition Quiz  To test your nutrition knowledge, answer the questions below as either True or False.     True False    []  []  1. There are many non-dairy sources of calcium.   []  []  2. Low-fat foods are always better.   []  []  3. Fiber isn’t important.   []  []  4. You need to watch portion sizes only when eating at home.   []  []  5. The outside aisles of the grocery store are the best places to shop.       Answers  TRUE. While dairy products have the most calcium, you can also get this mineral from other foods, too. Try calcium-fortified juices, cereals, breads, rice milk, or almond milk. Other sources of calcium are canned fish, some beans, and dark, leafy greens. Soybeans and some soy items are also good  options. These include soy yogurt, tempeh, and tofu made with calcium sulfate.  FALSE. Just because a food is low-fat or fat-free does not mean it’s always a better nutritional choice. For instance, fat-free cookies have the same amount of sugar and calories, or often even more, than regular cookies. Read labels.  FALSE. High-fiber foods help keep your digestive system healthy. Try to get 25 to 38 grams of fiber a day. Also, remember to drink plenty of water to prevent constipation.  FALSE. Portion control is just as important when you’re eating out. Many restaurants serve extra-large portions. So split your entree with someone at the table. Or ask for a take-home box. Then put half of your entree in it right away, before you start eating.  TRUE. This is where the fresh foods tend to be. These include fruits, vegetables, grains, and dairy. Processed foods are more likely to be on the inside aisles. When shopping these aisles, read labels extra carefully.  [employer] last reviewed this educational content on 6/1/2017  © 1777-3673 The [employer]. [address] [place] [address]. All rights reserved. This information is not intended as a substitute for professional medical care. Always follow your healthcare professional's instructions. Prevention Guidelines, Women Ages 50 to 64  Screening tests and vaccines are an important part of managing your health. A screening test is done to find possible disorders or diseases in people who don't have any symptoms. The goal is to find a disease early so lifestyle changes can be made and you can be watched more closely to reduce the risk of disease, or to detect it early enough to treat it most effectively. Screening tests are not considered diagnostic, but are used to determine if more testing is needed. Health counseling is essential, too. Below are guidelines for these, for women ages 50 to 64. Keep in mind that screening advice varies among expert groups.  Talk with your healthcare provider about which tests are best for you and to make sure you’re up to date on what you need.  Screening Who needs it How often   Type 2 diabetes or prediabetes All women beginning at age 45 and women without symptoms at any age who are overweight or obese and have 1 or more additional risk factors for diabetes. At  least every 3 years   Type 2 diabetes or prediabetes All women diagnosed with gestational diabetes Lifelong testing at least every 3 years   Type 2 diabetes All women with prediabetes Every year   Unhealthy alcohol use All women in this age group At routine exams   Blood pressure All women in this age group Yearly checkup if your blood pressure is normal  Normal blood pressure is less than 120/80 mm Hg  If your blood pressure reading is higher than normal, follow the advice of your healthcare provider   Breast cancer All women at average risk in this age group Yearly mammogram should be done until age 54. At age 55, you can switch to every other year or choose to continue yearly.  All women should know how their breasts normally look and feel and know the possible benefits and risks of breast cancer screening with mammograms.   Cervical cancer All women in this age group, except women who have had a complete hysterectomy Pap test every 3 years or Pap test with human papillomavirus (HPV) test every 5 years   Chlamydia Women who are sexually active and at increased risk for infection At yearly routine exams   Colorectal cancer All women at average risk in this age group Multiple tests are available and are used at different times. Possible tests include:  Flexible sigmoidoscopy every 5 years, or  Colonoscopy every 10 years, or  CT colonography (virtual colonoscopy) every 5 years, or  Yearly fecal occult blood test, or  Yearly fecal immunochemical test every year, or  Stool DNA test, every 3 years  If you choose a test other than a colonoscopy and have an abnormal test result,  you will need to follow up with a colonoscopy. Screening advice varies among expert groups. Talk with your healthcare provider about which tests are best for you.  Some people should be screened using a different schedule because of their personal or family health history. Talk with your healthcare provider about your health history.   Depression All women in this age group At routine exams   Gonorrhea Sexually active women at increased risk for infection At yearly routine exams   Hepatitis C Anyone at increased risk; 1 time for those born between 1945 and 1965 At routine exams   High cholesterol or triglycerides All women in this age group who are at risk for coronary artery disease At least every 5 years; talk with your healthcare provider about your risk   HIV All women At least once during your lifetime; yearly if at high risk   Lung cancer Women between the ages of 55 to 74 who are in fairly good health and are at higher risk for lung cancer     Currently smoke or have  quit within past 15 years     30-pack-year smoking history  , Eligibility criteria and age limit (possibly up to age 80) may vary across major organizations Yearly lung cancer screening with a low-dose CT scan (LDCT) Talk with your healthcare provider for more information.   Obesity All women in this age group At yearly routine exams   Osteoporosis Women who are postmenopausal Talk with your healthcare provider   Syphilis Women at increased risk for infection At routine exams; talk with your healthcare provider   Tuberculosis Women at increased risk for infection Talk with your healthcare provider   Vision All women in this age group Talk with your healthcare provider   Vaccine Who needs it How often   Chickenpox (varicella) All women in this age group who have no record of this infection or vaccine 2 doses; the second dose should be given at least 4 weeks after the first dose   Hepatitis A Women at increased risk for infection 2 or 3 doses  (depending on the vaccine) given at least 6 months apart; talk with your healthcare provider   Hepatitis B Women at increased risk for infection 2 or 3 doses (depending on the vaccine) ; second dose should be given 1 month after the first dose; if a third dose, it should be given at least 2 months after the second dose and at least 4 months after the first dose; talk with your healthcare provider   Haemophilus influenzae Type B (HIB) Women at increased risk for infection 1 or 3 doses; talk with your healthcare provider   Influenza (flu) All women in this age group Once a year   Measles, mumps, rubella (MMR) Women in this age group born in 1957 or later who have no record of these infections or vaccines 1 or 2doses   Meningococcal Women at increased risk for infection 1 or more doses; talk with your healthcare provider   Pneumococcal conjugate vaccine (PCV13) and pneumococcal polysaccharide vaccine (PPSV23) Women at increased risk for infection PCV13: 1 dose ages 19 to 64 (protects against 13 types of pneumococcal bacteria)  PPSV23: 1 or 2 doses through age 64(protects against 23 types of pneumococcal bacteria)  Talk with your healthcare provider   Tetanus/diphtheria/pertussis (Td/Tdap) booster All women in this age group Td every 10 years, or a 1-time dose of Tdap instead of a Td booster after age 18, then Td every 10 years   Zoster (Shingles) All women ages 50 and older 2 vaccines are available:     Recombinant zoster vaccine (RZV; Shingrix) is recommended as the preferred shingles vaccine. It's given in a series of 2 doses The 2nd dose is given 2 to 6 months after the first. This is given even if you've had shingles before or or had a previous Zoster live vaccine (ZVL; Zostavax).  Zoster live vaccine (ZVL; Zostavax) may be given to healthy adults over age 60. It's given in one dose      Counseling Who needs it How often   BRCA gene mutation testing for breast and ovarian cancer susceptibility Women with increased  risk for having gene mutation When your risk is known; talk with your healthcare provider   Breast cancer and chemoprevention Women at high risk for breast cancer When your risk is known; talk with your healthcare provider   Diet and exercise Women who are overweight or obese When diagnosed, and then at routine exams   Sexually transmitted infection prevention Women at increased risk for infection At routine exams; talk with your healthcare provider   Use of daily aspirin Women ages 50 and up who are at high risk for cardiovascular health problems and not at increased risk for bleeding as identified by their healthcare provider When your risk is known; talk with your healthcare provider   Use of tobacco and the health effects it can cause All women in this age group Every exam   [employer] last reviewed this educational content on 6/1/2020 © 2000-2020 [employer]. All rights reserved. This information is not intended as a substitute for professional medical care. Always follow your healthcare professional's instructions.           Why Have a Pap Test?  Early on, cervical changes don't cause symptoms. Often, the only way to know you have cervical changes is to do a Pap test. A Pap test can find these problems early, when they are easier to treat. Pap tests can also detect some infections of the cervix and vagina.  What is a Pap test?  A Pap test is a procedure that helps find changes in the cervix that may lead to cancer. The cervix is the part of the uterus that opens into the vagina. For this test, a small sample of cells is taken from the cervix. This is done in your healthcare provider’s office. The cells are then analyzed in a lab. A Pap test is a safe procedure. It takes just a few minutes and causes little or no discomfort.  The HPV connection  Human papillomavirus or HPV is a family of viruses that spread through skin contact. Certain types are almost always spread through sexual contact. Some  HPV types cause genital warts (condyloma). But not all types of HPV cause visible symptoms. Certain types cause cell changes (dysplasia) in the cervix that can lead to cancer. In fact, HPV infection is the most important risk factor for cervical cancer. Healthcare providers can now test for HPV. Testing for HPV is often done with the Pap test. That’s why it’s important to have Pap tests as recommended by your healthcare provider. This helps ensure that any abnormal cells will be found and treated before they become cancerous.  Who should have a Pap test and HPV test?  Ask your healthcare provider when to start having Pap tests, whether you should have an HPV test done at the same time, and how often to have them. Follow these guidelines from the American Cancer Society for cervical cancer screening:  A first Pap test at age 21. And then every 3 years until age 29, HPV testing is not recommended during this time, though it may be done to follow-up on an abnormal Pap test.  Starting at age 30, the preferred testing is a Pap test done with an HPV test every 5 years. This should be done until age 65. Another option for women in this 30 to 65 age group is to have just the Pap test done every 3 years.  You may need a different screening schedule if you are at high risk for cervical cancer. Risk factors include having HIV, a weak immune system, or exposure to the medicine JORGE while your mother was pregnant with you. Talk with your healthcare provider about the best schedule for you.  If you’re over 65 and have had regular screenings for the last 10 years with no abnormal results in the last 20 years, you may stop cervical cancer screening.  If you had a hysterectomy that included removing your cervix, you can stop screening unless the hysterectomy was done to treat cervical cancer or pre-cancer. If you still have your cervix after the hysterectomy, you should continue screening according to the above guidelines.  Routine  testing does not need to be done each year. However, if your test is abnormal, your provider will let you know how often to be tested.   Women who have been vaccinated for HPV should still follow these guidelines.  If you have had cervical cancer, talk to your healthcare provider about the screening plan that's best for you.  [employer] last reviewed this educational content on 9/1/2017 © 2000-2020 [employer]. [address] [place] [address], [place] [address]. All rights reserved. This information is not intended as a substitute for professional medical care. Always follow your healthcare professional's instructions.        Breast Health: Breast Self-Awareness  What is breast self-awareness?  Breast self-awareness is knowing how your breasts normally look and feel. Your breasts change as you go through different stages of your life. So it’s important to learn what is normal for your breasts. Knowing about your breasts helps you spot any changes in them right away. Tell your healthcare provider about any changes.   Why is breast self-awareness important?   Many experts now say that women should focus on breast self-awareness instead of doing a breast self-examination (BSE). These experts include the American Cancer Society and the American Congress of Obstetricians and Gynecologists. Some experts even advise not teaching women to do a BSE. That’s because research hasn’t shown a clear benefit to doing BSEs.   Breast self-awareness is different than a BSE. It isn’t about following a certain method and schedule. It’s about knowing what's normal for your breasts. That way you can spot even small changes right away. If you see any changes, tell your healthcare provider.   Changes to look for  Call your healthcare provider if you find any changes in your breasts that worry you. These changes may be:   A lump  Nipple discharge other than breast milk, especially if it's bloody  Swelling  A change in size or shape  Skin  changes, such as redness, thickening, or dimpling of the skin  Swollen lymph nodes in the armpit  Nipple problems, such as pain or redness  If you find a lump  Call your provider if you find lumpiness in one breast. Also call if you feel something different in the tissue or feel a definite lump. Sometimes lumpiness may be due to menstrual changes. But there may be reason for concern.   Your provider may want to see you right away if you have:   Nipple discharge that is bloody  Skin changes on your breast, such as dimpling or puckering  It’s okay to be upset if you find a lump. Be sure to call your provider right away. Remember that most breast lumps are benign. This means they are not cancer.   [employer] last reviewed this educational content on 5/1/2020 © 2000-2020 [employer]. [address] [place] [address]. All rights reserved. This information is not intended as a substitute for professional medical care. Always follow your healthcare professional's instructions.        Understanding STIs    When it comes to sex, nothing is risk-free. Any sexual contact with the penis, vagina, anus, or mouth can spread a sexually transmitted infection (STI). These include chlamydia, gonorrhea, herpes, HIV, and genital warts. STIs are also known as sexually transmitted diseases (STDs). The only sure way to prevent STIs is not having sex (abstinence). But there are ways to make sex safer. Use a latex condom each time you have sex. And choose your partner wisely.  Use condoms for safer sex  If you have sex, latex condoms provide the best protection against STIs. Latex condoms stop the exchange of body fluids that carry certain STIs. They also limit contact with affected skin. Be aware that a condom doesn’t cover all skin. So affected skin that isn't covered can still transfer disease. But you’re safer with a condom than without one. Use a condom even if you use other birth control. Birth control methods  such as the pill or IUD help prevent pregnancy, but they don't protect against STIs.  Choose the right condom  Condoms made of latex prevent disease best. If you’re allergic to latex, use polyurethane condoms instead. Male condoms fit over the penis. Female condoms line the vagina. Before buying a condom, read the label to be sure it prevents disease. Some novelty condoms don’t.  The right lubricant helps  Buy lubricated condoms or use lubricant. This provides greater comfort and reduces the risk for condom breakage. Use only water-based lubricants. Don’t use oil, lotion, or petroleum jelly. They can weaken the condom, causing breakage. Also, you may want to choose lubricants without nonoxynol-9. This spermicide may cause irritation. It can raise the risk for certain STIs.  Use condoms correctly  For condoms to work, they must be used the right way. Keep these tips in mind:  Use a new latex condom each time you have sex. Slip the condom on the penis before any contact is made.  When ready to withdraw, hold the rim of the condom as the penis pulls out. This prevents the condom from slipping off.  Check the expiration date before using a condom.  Don’t store condoms in places that can get hot, such as a car or a wallet that is carried in a back pocket.  Get to know your partner  Safer sex is a process. It involves getting to know your partner and making informed choices. Ask each other how many partners you have had in the past, and how many you have now. Find out if either of you has HIV or any other STI. If you decide to have sex, use a condom each time. Don’t stop using condoms unless you’re sure neither of you has other partners and you’ve both been tested to confirm you don’t have HIV or other STIs. Then stay free of disease by having sex only with each other (monogamy).  Keep your cool  Don’t let alcohol or drugs cloud your judgment. They could lead you to have sex with someone you wouldn’t have chosen if you  were sober. Or you might forget to use a condom. If you do plan to have sex, keep a latex condom with you. Don’t wait until you’re in the heat of passion to try to find one.  Consider abstinence  The only way to be sure you won’t get an STI is to abstain from sex. Abstinence is a choice that many people make at some point in their life. Maybe you want to wait until you are sure you’re ready before you have sex. Maybe you’d like a break from the responsibilities of sex for a while. Or maybe you just want to know your partner better before taking the next step. Abstinence is a choice you can make now to protect your future.  [employer] last reviewed this educational content on 12/1/2018 © 2000-2020 The [employer]. [address] [place] [address], [place] [address]. All rights reserved. This information is not intended as a substitute for professional medical care. Always follow your healthcare professional's instructions.        Understanding Body Mass Index (BMI)   Body mass index (BMI) is a way to figure out your weight category. It uses the ratio of your height to your weight. The BMI is a measure of your weight that is corrected for height. Knowing your BMI is a way to tell if you are at a healthy weight, are underweight, or overweight. The higher your BMI, the greater your risk for weight-related health problems.  What BMI means for adults  BMI below 18.5: Underweight  BMI 18.5 to 24.9: Healthy weight or ideal body weight   BMI 25 to 29.9: Overweight  BMI 30 and over: Obese  BMI 40 and over: Severe obesity        Find your BMI with an online BMI calculator tool, such as these from the CDC:  BMI calculator for adults  BMI calculator for children and teens  Using the BMI chart  To figure out your BMI, find your height and weight (or the numbers closest to them) on the table below. Follow each column of numbers to where your height and weight meet on the table. That is your BMI.    [employer]Well last reviewed this  educational content on 9/1/2019 © 2000-2020 [employer]. [address] [place] [address], [place] [address]. All rights reserved. This information is not intended as a substitute for professional medical care. Always follow your healthcare professional's instructions.        Obesity and Its Impact on Health  Obesity is a major health problem in the [place] and all over the world. It affects nearly 2 out of 5 [place] adults.  What is obesity?  Obesity is a term used to describe a body weight that is above a normal weight for a specific height. Obesity is measured by using BMI (body mass index). BMI is a formula that uses a person’s weight divided by their height. BMI may be used to screen for weight problems but it’s important to know that BMI does not diagnose health problems or a person’s body fat. A high BMI number can mean high body fat. A BMI between 18.5 and 25 is normal. A BMI between 25 and 30 falls within the overweight range. A BMI 30 or higher is within the obese range. A BMI of 40 or more is considered extreme or severe obesity.  Why is obesity a problem?  Carrying too much weight can increase your risk for many serious health issues. These include problems with your heart, lungs, blood vessels, brain, and joints. Some of the problems that can happen include:  Heart and circulation problems. These include heart disease, high blood pressure, heart rhythm problems (atrial fibrillation), and stroke  Type 2 diabetes  Certain cancers, such as colon and breast cancer  Sleep apnea and other breathing problems.  Back or joint problems, such as osteoarthritis and gout  Digestive tract problems such as gallstones and GERD (gastroesophageal reflux disease)  Depression (with severe obesity)  Carrying too much weight can also affect your quality of life. And it can keep you from doing things you want or need to do.  How can you lower your risk for problems?  The key to lowering your risk for health problems from  obesity is to manage your weight. If you are overweight or obese, the first step is to lose weight. Studies show that losing as little as 5 percent of your body weight is good for your health.  An important part of losing weight involves developing health habits and behaviors. Here are some tips:  Control how much you eat. Food is your body’s way to get energy (calories). But if you take in more calories than your body uses, you’ll gain weight. Know your eating habits and keep your portions reasonable.  Make healthy eating choices. Choose foods with many nutrients that give your body the energy it needs without adding extra pounds (kilograms). Also limit foods with added sugar and fats.  Be more active. Exercise burns calories, which can help you manage your weight. Increase your daily movement, add aerobic activity, and include strength training.  Talk with your healthcare provider. Ask about working with a dietitian, health , exercise physiologist, or mental health provider who can support and encourage you.     Adding exercise to your day can help you control your weight.        If you have trouble losing weight, your healthcare provider may suggest medicines to help you. Weight loss (bariatric) surgery may also be an option for adults who have:  A BMI of 40 or more, or who are more than 100 pounds (45.4 kg) overweight  A BMI of 35 to less than 40 and a serious health problem such as type 2 diabetes, high blood pressure, or sleep apnea.  Not been able to stay at a healthy weight for a period of time in spite of efforts to lose weight through diet, exercise, or medicines  [employer] last reviewed this educational content on 3/1/2020  © 7284-6684 [employer]. [address] [place] [address], [place] [address]. All rights reserved. This information is not intended as a substitute for professional medical care. Always follow your healthcare professional's instructions.        Exercise: Making the Most of Your  Time  Your exercise goal is 30 minutes on most days. Aim for a total of 150 or more minutes a week. Not sure you can fit one 30-minute block of exercise time into your day? Split it up into shorter 10- and 15-minute blocks. Do this 2 to 3 times a day. You'll still get all of the benefits of exercise.    Use your whole body  Aerobic exercise works your heart and lungs. Some examples are walking, running, and cycling. Try adding a few other activities, too. This can help you strengthen and stretch many different muscles in your body.  Strengthen your:  Lower legs. Go up and down slowly on your toes, while you’re filing papers or washing dishes.  Upper legs. Lower yourself slowly into a chair without using your arms.  Stretch your:  Back. After you get up from a chair, place your palms on your low back and lean your upper body back.  Shoulders and chest. Lift your arms overhead and reach tall while waiting for your computer to warm up.  Lower legs. Raise your toes and press them against a wall (with your heel on the ground).  Find a few extra minutes  Try these tips to add some extra exercise and activity to your day:  At work. Pick a lunch spot a few blocks away and walk there and back. Take a brisk walk on your break.  At home. Ride bikes with your kids. Use an exercise bike in the living room while you watch TV.  On errands. Park a few blocks away from where you need to go and walk there. Power-walk in malls by doing a fast lap or two before shopping.  At play. Go hiking with friends instead of sitting on a bench. Walk through a street fair instead of sitting in a movie.  Tips for sticking with it  Plan your activity by writing it on a calendar.  Find a sheila at work to walk with during a lunch break.  Take your kids with you on a short walk after dinner.  Post a reminder list of the benefits of being active where you can see it.  Set an alarm to tell you when it’s time for an activity break.   StayWell last  reviewed this educational content on 3/1/2018  © 5892-0904 [employer]. [address] [place] [address], [place] [address]. All rights reserved. This information is not intended as a substitute for professional medical care. Always follow your healthcare professional's instructions.        4 Steps for Eating Healthier  Changing the way you eat can improve your health. It can lower your cholesterol and blood pressure, and help you stay at a healthy weight. Your diet doesn’t have to be bland and boring to be healthy. Just watch your calories and follow these steps:    Step 1. Eat fewer unhealthy fats  Choose more fish and lean meats instead of fatty cuts of meat.  Skip butter and lard, and use less margarine.  Pass on foods that have palm, coconut, or hydrogenated oils.  Eat fewer high-fat dairy foods like cheese, ice cream, and whole milk.  Get a heart-healthy cookbook and try some low-fat recipes.    Step 2. Go light on salt  Keep the saltshaker off the table.  Limit high-salt ingredients, such as soy sauce, bouillon, and garlic salt.  Instead of adding salt when cooking, season your food with herbs and flavorings. Try lemon, garlic, and onion, or salt-free herb seasonings.  Limit convenience foods, such as boxed or canned foods and restaurant food.  Read food labels and choose lower-sodium options.    Step 3. Limit sugar  Pause before you add sugars to pancakes, cereal, coffee, or tea. This includes white and brown table sugar, syrup, honey, and molasses. Cut your usual amount by half.  Use non-sugar sweeteners. Stevia, aspartame, and sucralose can satisfy a sweet tooth without adding calories.  Swap out sugar-filled soda and other drinks. Buy sugar-free or low-calorie beverages. Remember water is always the best choice.  Read labels and choose foods with less added sugar. Keep in mind that dairy foods and foods with fruit will have some natural sugar.  Cut the sugar in recipes by 1/3 to 1/2. Boost the flavor  with extracts like almond, vanilla, or orange. Or add spices such as cinnamon or nutmeg.    Step 4. Eat more fiber  Eat fresh fruits and vegetables every day.  Boost your diet with whole grains. Go for oats, whole-grain rice, and bran.  Add beans and lentils to your meals.  Drink more water to match your fiber increase to help prevent constipation.    StayWell last reviewed this educational content on 6/1/2017 © 2000-2020 [employer]. [address] [place] [address]. All rights reserved. This information is not intended as a substitute for professional medical care. Always follow your healthcare professional's instructions.        Eating Healthy on the Run     Many grocery stores stock pre-made salads for eating on the run.     Need to eat on the run? This often means grabbing \"junk\" or fast food full of fat, salt, sugar, and cholesterol. But being in a rush doesn't mean that you can't eat healthy.  \"Fast\" food made healthy  Try these ways to get good nutrition, fast.  Go to a grocery or convenience market instead of a fast-food restaurant. Look for choices like sandwiches, yogurt, fresh fruit, and juices.  Buy precut, prepackaged fresh or frozen fruits and vegetables. You can use them for a snack, salad, smoothie, or stir-emerson.  Microwave a frozen dinner that has less than 15 grams (g) of fat and less than 800 milligrams (mg) of sodium. Complete the meal with a whole-grain roll, vegetables, and fresh fruit.  If you must have fast food, consider your options. Go for veggie burgers, broiled and skinless chicken breast sandwiches, or dinner salads with low-fat dressing. Avoid large (super-sized) portions.  Blot the extra oil from food with a napkin before you eat it.  Instead of french fries, choose a baked potato with salsa.  Tip  Fast-food restaurants often have printed nutrition information available. Ask for this information and look up your favorite items before you order.   StayWell last  reviewed this educational content on 6/1/2017 © 2000-2020 [employer]. [address] [place] [address], [place] [address]. All rights reserved. This information is not intended as a substitute for professional medical care. Always follow your healthcare professional's instructions.        Eating Healthy: Good Nutrition Quiz  To test your nutrition knowledge, answer the questions below as either True or False.     True False    []  []  1. There are many non-dairy sources of calcium.   []  []  2. Low-fat foods are always better.   []  []  3. Fiber isn’t important.   []  []  4. You need to watch portion sizes only when eating at home.   []  []  5. The outside aisles of the grocery store are the best places to shop.       Answers  TRUE. While dairy products have the most calcium, you can also get this mineral from other foods, too. Try calcium-fortified juices, cereals, breads, rice milk, or almond milk. Other sources of calcium are canned fish, some beans, and dark, leafy greens. Soybeans and some soy items are also good options. These include soy yogurt, tempeh, and tofu made with calcium sulfate.  FALSE. Just because a food is low-fat or fat-free does not mean it’s always a better nutritional choice. For instance, fat-free cookies have the same amount of sugar and calories, or often even more, than regular cookies. Read labels.  FALSE. High-fiber foods help keep your digestive system healthy. Try to get 25 to 38 grams of fiber a day. Also, remember to drink plenty of water to prevent constipation.  FALSE. Portion control is just as important when you’re eating out. Many restaurants serve extra-large portions. So split your entree with someone at the table. Or ask for a take-home box. Then put half of your entree in it right away, before you start eating.  TRUE. This is where the fresh foods tend to be. These include fruits, vegetables, grains, and dairy. Processed foods are more likely to be on the inside  aisles. When shopping these aisles, read labels extra carefully.  StayWell last reviewed this educational content on 6/1/2017  © 8964-0297 [employer]. [address] [place] [address], [place] [address]. All rights reserved. This information is not intended as a substitute for professional medical care. Always follow your healthcare professional's instructions.

## 2024-08-09 ENCOUNTER — TELEPHONE (OUTPATIENT)
Dept: INTERNAL MEDICINE CLINIC | Facility: CLINIC | Age: 54
End: 2024-08-09

## 2024-08-09 DIAGNOSIS — M79.641 BILATERAL HAND PAIN: Primary | ICD-10-CM

## 2024-08-09 DIAGNOSIS — M79.642 BILATERAL HAND PAIN: Primary | ICD-10-CM

## 2024-08-09 NOTE — TELEPHONE ENCOUNTER
Patient is in need of a referral in Orthopedics for Dr. DAISHA Su.  She is scheduled for 08/12/2024 for Bilateral Hands.  Please include multiple visits.

## 2024-08-12 ENCOUNTER — APPOINTMENT (OUTPATIENT)
Dept: ULTRASOUND IMAGING | Facility: HOSPITAL | Age: 54
End: 2024-08-12
Attending: EMERGENCY MEDICINE
Payer: COMMERCIAL

## 2024-08-12 ENCOUNTER — HOSPITAL ENCOUNTER (EMERGENCY)
Facility: HOSPITAL | Age: 54
Discharge: HOME | End: 2024-08-12
Attending: EMERGENCY MEDICINE
Payer: COMMERCIAL

## 2024-08-12 ENCOUNTER — APPOINTMENT (OUTPATIENT)
Dept: GENERAL RADIOLOGY | Facility: HOSPITAL | Age: 54
End: 2024-08-12
Attending: EMERGENCY MEDICINE
Payer: COMMERCIAL

## 2024-08-12 VITALS
HEART RATE: 69 BPM | TEMPERATURE: 98 F | WEIGHT: 287 LBS | BODY MASS INDEX: 49 KG/M2 | DIASTOLIC BLOOD PRESSURE: 79 MMHG | OXYGEN SATURATION: 100 % | RESPIRATION RATE: 18 BRPM | SYSTOLIC BLOOD PRESSURE: 147 MMHG

## 2024-08-12 DIAGNOSIS — M79.89 LEFT LEG SWELLING: Primary | ICD-10-CM

## 2024-08-12 DIAGNOSIS — R07.81 PLEURITIC CHEST PAIN: ICD-10-CM

## 2024-08-12 LAB
ANION GAP SERPL CALC-SCNC: 5 MMOL/L (ref 0–18)
APTT PPP: 31.6 SECONDS (ref 23–36)
ATRIAL RATE: 77 BPM
BASOPHILS # BLD AUTO: 0.03 X10(3) UL (ref 0–0.2)
BASOPHILS NFR BLD AUTO: 0.7 %
BUN BLD-MCNC: 17 MG/DL (ref 9–23)
BUN/CREAT SERPL: 19.3 (ref 10–20)
CALCIUM BLD-MCNC: 9.5 MG/DL (ref 8.7–10.4)
CHLORIDE SERPL-SCNC: 110 MMOL/L (ref 98–112)
CO2 SERPL-SCNC: 26 MMOL/L (ref 21–32)
CREAT BLD-MCNC: 0.88 MG/DL
D DIMER PPP FEU-MCNC: <0.27 UG/ML FEU (ref ?–0.56)
DEPRECATED RDW RBC AUTO: 41 FL (ref 35.1–46.3)
EGFRCR SERPLBLD CKD-EPI 2021: 77 ML/MIN/1.73M2 (ref 60–?)
EOSINOPHIL # BLD AUTO: 0.06 X10(3) UL (ref 0–0.7)
EOSINOPHIL NFR BLD AUTO: 1.3 %
ERYTHROCYTE [DISTWIDTH] IN BLOOD BY AUTOMATED COUNT: 13 % (ref 11–15)
GLUCOSE BLD-MCNC: 105 MG/DL (ref 70–99)
HCT VFR BLD AUTO: 35.5 %
HGB BLD-MCNC: 11.8 G/DL
IMM GRANULOCYTES # BLD AUTO: 0.01 X10(3) UL (ref 0–1)
IMM GRANULOCYTES NFR BLD: 0.2 %
LYMPHOCYTES # BLD AUTO: 1.38 X10(3) UL (ref 1–4)
LYMPHOCYTES NFR BLD AUTO: 30.2 %
MCH RBC QN AUTO: 28.9 PG (ref 26–34)
MCHC RBC AUTO-ENTMCNC: 33.2 G/DL (ref 31–37)
MCV RBC AUTO: 86.8 FL
MONOCYTES # BLD AUTO: 0.48 X10(3) UL (ref 0.1–1)
MONOCYTES NFR BLD AUTO: 10.5 %
NEUTROPHILS # BLD AUTO: 2.61 X10 (3) UL (ref 1.5–7.7)
NEUTROPHILS # BLD AUTO: 2.61 X10(3) UL (ref 1.5–7.7)
NEUTROPHILS NFR BLD AUTO: 57.1 %
OSMOLALITY SERPL CALC.SUM OF ELEC: 294 MOSM/KG (ref 275–295)
P AXIS: 46 DEGREES
P-R INTERVAL: 168 MS
PLATELET # BLD AUTO: 272 10(3)UL (ref 150–450)
POTASSIUM SERPL-SCNC: 4.1 MMOL/L (ref 3.5–5.1)
Q-T INTERVAL: 442 MS
QRS DURATION: 148 MS
QTC CALCULATION (BEZET): 500 MS
R AXIS: -24 DEGREES
RBC # BLD AUTO: 4.09 X10(6)UL
SARS-COV-2 RNA RESP QL NAA+PROBE: NOT DETECTED
SODIUM SERPL-SCNC: 141 MMOL/L (ref 136–145)
T AXIS: 20 DEGREES
TROPONIN I SERPL HS-MCNC: <3 NG/L
VENTRICULAR RATE: 77 BPM
WBC # BLD AUTO: 4.6 X10(3) UL (ref 4–11)

## 2024-08-12 PROCEDURE — 84484 ASSAY OF TROPONIN QUANT: CPT | Performed by: EMERGENCY MEDICINE

## 2024-08-12 PROCEDURE — 96375 TX/PRO/DX INJ NEW DRUG ADDON: CPT

## 2024-08-12 PROCEDURE — 93005 ELECTROCARDIOGRAM TRACING: CPT

## 2024-08-12 PROCEDURE — 99285 EMERGENCY DEPT VISIT HI MDM: CPT

## 2024-08-12 PROCEDURE — 71045 X-RAY EXAM CHEST 1 VIEW: CPT | Performed by: EMERGENCY MEDICINE

## 2024-08-12 PROCEDURE — 93010 ELECTROCARDIOGRAM REPORT: CPT

## 2024-08-12 PROCEDURE — 80048 BASIC METABOLIC PNL TOTAL CA: CPT | Performed by: EMERGENCY MEDICINE

## 2024-08-12 PROCEDURE — 85025 COMPLETE CBC W/AUTO DIFF WBC: CPT | Performed by: EMERGENCY MEDICINE

## 2024-08-12 PROCEDURE — 96374 THER/PROPH/DIAG INJ IV PUSH: CPT

## 2024-08-12 PROCEDURE — 85379 FIBRIN DEGRADATION QUANT: CPT | Performed by: EMERGENCY MEDICINE

## 2024-08-12 PROCEDURE — 85730 THROMBOPLASTIN TIME PARTIAL: CPT | Performed by: EMERGENCY MEDICINE

## 2024-08-12 PROCEDURE — 93971 EXTREMITY STUDY: CPT | Performed by: EMERGENCY MEDICINE

## 2024-08-12 RX ORDER — FUROSEMIDE 10 MG/ML
20 INJECTION INTRAMUSCULAR; INTRAVENOUS ONCE
Status: COMPLETED | OUTPATIENT
Start: 2024-08-12 | End: 2024-08-12

## 2024-08-12 RX ORDER — KETOROLAC TROMETHAMINE 10 MG/1
10 TABLET, FILM COATED ORAL EVERY 6 HOURS PRN
Qty: 20 TABLET | Refills: 0 | Status: SHIPPED | OUTPATIENT
Start: 2024-08-12 | End: 2024-08-17

## 2024-08-12 RX ORDER — KETOROLAC TROMETHAMINE 15 MG/ML
15 INJECTION, SOLUTION INTRAMUSCULAR; INTRAVENOUS ONCE
Status: COMPLETED | OUTPATIENT
Start: 2024-08-12 | End: 2024-08-12

## 2024-08-12 NOTE — ED PROVIDER NOTES
Patient Seen in: [facility]    History     Chief Complaint   Patient presents with    Cough     Stated Complaint: asthma/ L leg pain     HPI    54-year-old female with past medical history of hypertension, diabetes, asthma, SLE on Plaquenil in to prednisone for past few month addition to being status post hysterectomy on estradiol for the past year presenting for evaluation with pleuritic chest pain over past three days associated with left leg swelling in setting of recent travel (driving 12+ hours) to/from [place].  Patient reporting sibling history of early cardiac death in addition to family history of VTE.  Patient without personal history of CAD, EMR reviewed with 5/1/2024 Lexiscan demonstrating normal perfusion study with preserved ejection fraction. (+) cough/dyspnea, ongoing/worsening with pleurisy despite home albuterol.    Past Medical History:    Acute bilateral low back pain    Anemia    Asthma (HCC)    Bronchitis    Essential hypertension    Glaucoma    Hypokalemia    Interscapular pain    Last Assessment & Plan:    Formatting of this note might be different from the original.   Patient reports pain is a 2/10 today.      Lupus (HCC)    Neck pain    Last Assessment & Plan:    Formatting of this note might be different from the original.   Patient reports pain is a 2/10 today.      Right otitis media    Slow transit constipation    Type 2 diabetes mellitus without complication (HCC)       Past Surgical History:   Procedure Laterality Date    Lap gastric bypass/daniel-en-y      Total abdom hysterectomy              No family history on file.    Social History     Socioeconomic History    Marital status: Single   Tobacco Use    Smoking status: Never     Passive exposure: Never    Smokeless tobacco: Never   Vaping Use    Vaping status: Never Used   Substance and Sexual Activity    Alcohol use: Never    Drug use: Never     Social Determinants of Health     Food Insecurity: Low  Risk  (5/25/2023)    Received from [facility]    Food Insecurity     Have there been times that your food ran out, and you didn't have money to get more?: No     Are there times that you worry that this might happen?: No   Transportation Needs: Low Risk  (5/25/2023)    Received from [facility]    Transportation Needs     Do you have trouble getting transportation to medical appointments?: No       Review of Systems :  Constitutional: As per HPI  Respiratory:(+) cough and shortness of breath.    Cardiovascular: (+) chest pain.    Positive for stated complaint: asthma/ L leg pain  Other systems are as noted in HPI.  Constitutional and vital signs reviewed.      All other systems reviewed and negative except as noted above.    PSFH elements reviewed from today and agreed except as otherwise stated in HPI.    Physical Exam     ED Triage Vitals [08/12/24 1050]   /73   Pulse 83   Resp 18   Temp 98 °F (36.7 °C)   Temp src    SpO2 98 %   O2 Device        Current:/73   Pulse 83   Temp 98 °F (36.7 °C)   Resp 18   Wt 130.2 kg   SpO2 98%   BMI 49.26 kg/m²         Physical Exam   Constitutional: No distress.   HEENT: MMM.  Head: Normocephalic.   Eyes: No injection.   Cardiovascular: RRR. BUE with 2+ radial pulses.  Pulmonary/Chest: Effort normal. CTAB.  Abdominal: Soft. Nontender.  Musculoskeletal: No gross deformity.  Left calf/lower extremity edema without cutaneous or crepitant change and with soft compartments.  Neurological: Alert.  Left lower extremity with 5/5 strength proximally and distally.  Skin: Skin is warm.   Psychiatric: Cooperative.  Nursing note and vitals reviewed.        ED Course     Labs Reviewed   CBC WITH DIFFERENTIAL WITH PLATELET - Abnormal; Notable for the following components:       Result Value    HGB 11.8 (*)     All other components within normal limits   BASIC METABOLIC  PANEL (8) - Abnormal; Notable for the following components:    Glucose 105 (*)     All other components within normal limits   TROPONIN I HIGH SENSITIVITY - Normal   D-DIMER - Normal   PTT, ACTIVATED - Normal   RAPID SARS-COV-2 BY PCR - Normal     EKG    Rate, intervals and axes as noted on EKG Report.  Rate: 77  Rhythm: Sinus Rhythm  Reading: NSR 77 with RBBB unchanged from 3/17/2024 as independently interpreted by myself           US VENOUS DOPPLER LEG LEFT - DIAG IMG (CPT=93971)    Result Date: 8/12/2024  PROCEDURE: US VENOUS DOPPLER LEG LEFT-DIAG IMG (CPT=93971)  COMPARISON: None.  INDICATIONS: LLE swelling  TECHNIQUE: Color duplex Doppler venous ultrasound of the left lower extremity was performed in the usual manner.  FINDINGS: The femoral and popliteal veins appear normal.  Normal flow was demonstrated with color and pulsed Doppler.  Visualized portions of the great and small saphenous, posterior tibial, and peroneal veins appear normal.   THROMBI: None visible. COMPRESSIBILITY: Normal. OTHER: Negative.         CONCLUSION: Normal examination.     Dictated by (CST): Heber Arredondo MD on 8/12/2024 at 1:39 PM     Finalized by (CST): Heber Arredondo MD on 8/12/2024 at 1:39 PM          XR CHEST AP PORTABLE  (CPT=71045)    Result Date: 8/12/2024  PROCEDURE: XR CHEST AP PORTABLE  (CPT=71045) TIME: 1215 hours.   COMPARISON: [facility], XR CHEST AP PORTABLE (CPT=71045), 3/17/2024, 5:25 PM.  INDICATIONS: Shortness of breath and chest discomfort. History of asthma.  TECHNIQUE:   Single view.   FINDINGS:  CARDIAC/VASC: Normal.  No cardiac silhouette abnormality or cardiomegaly.  Unremarkable pulmonary vasculature.  MEDIAST/FAB:   No visible mass or adenopathy. LUNGS/PLEURA: Normal.  No significant pulmonary parenchymal abnormalities.  No effusion or pleural thickening. BONES: No fracture or visible bony lesion. OTHER: Negative.          CONCLUSION: No radiographic evidence of acute cardiopulmonary  abnormality.    Dictated by (CST): Indra Marte MD on 8/12/2024 at 1:01 PM     Finalized by (CST): Indra Marte MD on 8/12/2024 at 1:02 PM           Heart Score:    HEART Score      Title      Criteria Score   Age: 45-64 Age Score: 1   History: Slightly Suspicious Hx Score: 0     EKG: Normal EKG Score: 0   HTN: Yes   Hypercholesterolemia: No   Atherosclerosis/PVD: No     DM: No   BMI>30kg/m2: Yes   Smoking: No   Family History: Yes         Other Risk Factor Score: 3             Lab Results   Component Value Date    TROPHS <3 08/12/2024           HEART Score: 3        Risk of adverse cardiac event is 0.9-1.7%            ED Course as of 08/12/24 1432  ------------------------------------------------------------  Time: 08/12 1405  Comment: Resting comfortably with improving symptoms after ketorolac - stable for discharge with symptomatic care and ongoing outpatient followup with consideration for repeat sonography.       MDM   DIFFERENTIAL DIAGNOSIS: After history and physical exam differential diagnosis includes but is not limited to DVT/PE, ACS, myocarditis, pneumothorax.    Pulse ox: 98%:Normal on RA, as independently interpreted by myself    Cardiac Monitor Interpretation:   Pulse Readings from Last 1 Encounters:   08/12/24 83   , sinus,      Medical Decision Making  Evaluation for left leg swelling without neurovasc compromise or cutaneous/crepitant change in addition to pleuritic chest pain in setting of cough with recent prolonged travel.  Dimer/CXR in addition to labs/troponin nonacute with improving symptoms after ketorolac - stable for discharge with ongoing outpatient followup.    Problems Addressed:  Left leg swelling: acute illness or injury  Pleuritic chest pain: acute illness or injury    Amount and/or Complexity of Data Reviewed  External Data Reviewed: labs and notes.     Details: 7/17/2024 A1c, outpatient note reviewed  Labs: ordered. Decision-making details documented in ED Course.  Radiology:  ordered and independent interpretation performed. Decision-making details documented in ED Course.     Details: CXR without obvious pneumothorax as independently interpreted by myself  ECG/medicine tests: ordered and independent interpretation performed. Decision-making details documented in ED Course.    Risk  Prescription drug management.        I was wearing at minimum a facemask and eye protection throughout this encounter with handwashing performed prior and after patient evaluation without personal hand/facial/oropharyngeal contact and gloves worn throughout encounter. See note and/or contact this provider for further PPE details.    Disposition and Plan     Clinical Impression:  1. Left leg swelling    2. Pleuritic chest pain        Disposition:  Discharge    Follow-up:  Susan Darnell, MANUEL  [address] [id number] [phone]    Call  For followup, re-evaluation and repeat US should symptoms persist.      Medications Prescribed:  Current Discharge Medication List        START taking these medications    Details   Ketorolac Tromethamine 10 MG Oral Tab Take 1 tablet (10 mg total) by mouth every 6 (six) hours as needed for Pain.  Qty: 20 tablet, Refills: 0

## 2024-08-12 NOTE — ED INITIAL ASSESSMENT (HPI)
Patient states she has left lower leg swelling for about a week, states she has been using her inhaler more for the past few days, states she has discomfort when she breathes

## 2024-08-13 ENCOUNTER — NURSE TRIAGE (OUTPATIENT)
Dept: INTERNAL MEDICINE CLINIC | Facility: CLINIC | Age: 54
End: 2024-08-13

## 2024-08-13 ENCOUNTER — HOSPITAL ENCOUNTER (EMERGENCY)
Facility: HOSPITAL | Age: 54
Discharge: HOME | End: 2024-08-13
Attending: EMERGENCY MEDICINE
Payer: COMMERCIAL

## 2024-08-13 ENCOUNTER — APPOINTMENT (OUTPATIENT)
Dept: GENERAL RADIOLOGY | Facility: HOSPITAL | Age: 54
End: 2024-08-13
Attending: EMERGENCY MEDICINE
Payer: COMMERCIAL

## 2024-08-13 VITALS
OXYGEN SATURATION: 97 % | WEIGHT: 287.06 LBS | SYSTOLIC BLOOD PRESSURE: 128 MMHG | HEART RATE: 74 BPM | TEMPERATURE: 97 F | DIASTOLIC BLOOD PRESSURE: 73 MMHG | RESPIRATION RATE: 20 BRPM | BODY MASS INDEX: 49 KG/M2

## 2024-08-13 DIAGNOSIS — J40 BRONCHITIS: ICD-10-CM

## 2024-08-13 DIAGNOSIS — R07.89 CHEST TIGHTNESS: Primary | ICD-10-CM

## 2024-08-13 LAB
ANION GAP SERPL CALC-SCNC: 8 MMOL/L (ref 0–18)
ATRIAL RATE: 80 BPM
BASOPHILS # BLD AUTO: 0.03 X10(3) UL (ref 0–0.2)
BASOPHILS NFR BLD AUTO: 0.8 %
BNP SERPL-MCNC: 8 PG/ML
BUN BLD-MCNC: 22 MG/DL (ref 9–23)
BUN/CREAT SERPL: 22.9 (ref 10–20)
CALCIUM BLD-MCNC: 9.4 MG/DL (ref 8.7–10.4)
CHLORIDE SERPL-SCNC: 110 MMOL/L (ref 98–112)
CO2 SERPL-SCNC: 27 MMOL/L (ref 21–32)
CREAT BLD-MCNC: 0.96 MG/DL
DEPRECATED RDW RBC AUTO: 40.9 FL (ref 35.1–46.3)
EGFRCR SERPLBLD CKD-EPI 2021: 69 ML/MIN/1.73M2 (ref 60–?)
EOSINOPHIL # BLD AUTO: 0.11 X10(3) UL (ref 0–0.7)
EOSINOPHIL NFR BLD AUTO: 3 %
ERYTHROCYTE [DISTWIDTH] IN BLOOD BY AUTOMATED COUNT: 12.8 % (ref 11–15)
GLUCOSE BLD-MCNC: 95 MG/DL (ref 70–99)
HCT VFR BLD AUTO: 36.3 %
HGB BLD-MCNC: 12.2 G/DL
IMM GRANULOCYTES # BLD AUTO: 0 X10(3) UL (ref 0–1)
IMM GRANULOCYTES NFR BLD: 0 %
LYMPHOCYTES # BLD AUTO: 1.66 X10(3) UL (ref 1–4)
LYMPHOCYTES NFR BLD AUTO: 45.4 %
MCH RBC QN AUTO: 29.2 PG (ref 26–34)
MCHC RBC AUTO-ENTMCNC: 33.6 G/DL (ref 31–37)
MCV RBC AUTO: 86.8 FL
MONOCYTES # BLD AUTO: 0.49 X10(3) UL (ref 0.1–1)
MONOCYTES NFR BLD AUTO: 13.4 %
NEUTROPHILS # BLD AUTO: 1.37 X10 (3) UL (ref 1.5–7.7)
NEUTROPHILS # BLD AUTO: 1.37 X10(3) UL (ref 1.5–7.7)
NEUTROPHILS NFR BLD AUTO: 37.4 %
OSMOLALITY SERPL CALC.SUM OF ELEC: 303 MOSM/KG (ref 275–295)
P AXIS: 59 DEGREES
P-R INTERVAL: 166 MS
PLATELET # BLD AUTO: 262 10(3)UL (ref 150–450)
POTASSIUM SERPL-SCNC: 3.4 MMOL/L (ref 3.5–5.1)
Q-T INTERVAL: 428 MS
QRS DURATION: 150 MS
QTC CALCULATION (BEZET): 493 MS
R AXIS: -24 DEGREES
RBC # BLD AUTO: 4.18 X10(6)UL
SARS-COV-2 RNA RESP QL NAA+PROBE: NOT DETECTED
SODIUM SERPL-SCNC: 145 MMOL/L (ref 136–145)
T AXIS: 37 DEGREES
TROPONIN I SERPL HS-MCNC: <3 NG/L
VENTRICULAR RATE: 80 BPM
WBC # BLD AUTO: 3.7 X10(3) UL (ref 4–11)

## 2024-08-13 PROCEDURE — 36415 COLL VENOUS BLD VENIPUNCTURE: CPT

## 2024-08-13 PROCEDURE — 93010 ELECTROCARDIOGRAM REPORT: CPT

## 2024-08-13 PROCEDURE — 99284 EMERGENCY DEPT VISIT MOD MDM: CPT

## 2024-08-13 PROCEDURE — 71045 X-RAY EXAM CHEST 1 VIEW: CPT | Performed by: EMERGENCY MEDICINE

## 2024-08-13 PROCEDURE — 80048 BASIC METABOLIC PNL TOTAL CA: CPT | Performed by: EMERGENCY MEDICINE

## 2024-08-13 PROCEDURE — 93005 ELECTROCARDIOGRAM TRACING: CPT

## 2024-08-13 PROCEDURE — 84484 ASSAY OF TROPONIN QUANT: CPT | Performed by: EMERGENCY MEDICINE

## 2024-08-13 PROCEDURE — 85025 COMPLETE CBC W/AUTO DIFF WBC: CPT | Performed by: EMERGENCY MEDICINE

## 2024-08-13 PROCEDURE — 83880 ASSAY OF NATRIURETIC PEPTIDE: CPT | Performed by: EMERGENCY MEDICINE

## 2024-08-13 PROCEDURE — 94644 CONT INHLJ TX 1ST HOUR: CPT

## 2024-08-13 RX ORDER — PREDNISONE 20 MG/1
60 TABLET ORAL DAILY
Qty: 15 TABLET | Refills: 0 | Status: SHIPPED | OUTPATIENT
Start: 2024-08-13 | End: 2024-08-18

## 2024-08-13 NOTE — TELEPHONE ENCOUNTER
Susan Darnell,     Patient requesting referral to Dr. Su for bilateral hand pain.     Pended referral please review diagnosis and sign off if you agree.    Thank you.  Felecia Cleary  Managed Care

## 2024-08-13 NOTE — TELEPHONE ENCOUNTER
Action Requested: Summary for Provider     []  Critical Lab, Recommendations Needed  [] Need Additional Advice  []   FYI    []   Need Orders  [] Need Medications Sent to Pharmacy  []  Other     SUMMARY:    Per the patient she will call 911 for she is not at home and at a relatives house.    The patient stated was in [facility] yesterday.    She continues to have center left chest pain which is now continuous presently 6/10 even after taking her pain medication.  She is now weaker, has a headache, nauseated, body aches and has a cough which comes and goes. She does have shortness of breath which at time is having a hard time.   Her left leg swelling has gone down.      I offered to call 911 but she stated she will call.  She wanted to come back to [facility] but I stated she needs to go to [facility].      Susan LONG is not in the office routed to Dr. Verde.    Reason for call: Chest Pain Angina  Onset: Data Unavailable      General Assessment (questions to ask the caller)  Do you consider this a medical emergency?: No  Can you access 911?: Yes  Do you have any pain?: Yes  What is the severity of your pain? (Scale 1-10): 6 (after pain medication- center of left chest)  Do you have a fever?: No  What is the date of your last medical exam?: 07/17/24  Additional Assessments  Are these symptoms new, recurrent, or chronic?: new (chest pain started 4 days ago)              Reason for Disposition   Chest pain lasting longer than 5 minutes and over 44 years old    Protocols used: Chest Pain-A-[place]

## 2024-08-13 NOTE — ED PROVIDER NOTES
Patient Seen in: [facility] Department    History     Chief Complaint   Patient presents with    Chest Pain Angina       HPI    54-year-old female presents ER with complaint of chest tightness.  Patient was seen in ER yesterday with full cardiac workup as well as ultrasound which was negative.  Patient states she called her primary care physician for follow-up appointment she mentioned that she still having chest tightness her primary care physician sent her back to the ER.  Patient states she has been coughing a lot recently which she thinks her chest pain is secondary to musculoskeletal coughing.  Patient with history of asthma as well    History reviewed.   Past Medical History:    Acute bilateral low back pain    Anemia    Asthma (HCC)    Bronchitis    Essential hypertension    Glaucoma    Hypokalemia    Interscapular pain    Last Assessment & Plan:    Formatting of this note might be different from the original.   Patient reports pain is a 2/10 today.      Lupus (HCC)    Neck pain    Last Assessment & Plan:    Formatting of this note might be different from the original.   Patient reports pain is a 2/10 today.      Right otitis media    Slow transit constipation    Type 2 diabetes mellitus without complication (HCC)       History reviewed.   Past Surgical History:   Procedure Laterality Date    Lap gastric bypass/daniel-en-y      Total abdom hysterectomy           Medications :  (Not in a hospital admission)       No family history on file.    Smoking Status:   Social History     Socioeconomic History    Marital status: Single   Tobacco Use    Smoking status: Never     Passive exposure: Never    Smokeless tobacco: Never   Vaping Use    Vaping status: Never Used   Substance and Sexual Activity    Alcohol use: Never    Drug use: Never       ROS  All pertinent positives for the review of systems are mentioned in the HPI  All other organ systems are reviewed and are negative.    Constitutional and  vital signs reviewed.      Social History and Family History elements reviewed from today, pertinent positives to the presenting problem noted.    Physical Exam     ED Triage Vitals [08/13/24 1236]   /82   Pulse 80   Resp 20   Temp 96.6 °F (35.9 °C)   Temp src Temporal   SpO2 98 %   O2 Device None (Room air)       All measures to prevent infection transmission during my interaction with the patient were taken. The patient was already wearing a droplet mask on my arrival to the room. Personal protective equipment including droplet mask, eye protection, and gloves were worn throughout the duration of the exam.  Handwashing was performed prior to and after the exam.  Stethoscope and any equipment used during my examination was cleaned with super sani-cloth germicidal wipes following the exam.     Physical Exam  Vitals and nursing note reviewed.   Constitutional:       Appearance: She is well-developed.   HENT:      Head: Normocephalic and atraumatic.      Right Ear: External ear normal.      Left Ear: External ear normal.      Nose: Nose normal.   Eyes:      Conjunctiva/sclera: Conjunctivae normal.      Pupils: Pupils are equal, round, and reactive to light.   Cardiovascular:      Rate and Rhythm: Normal rate and regular rhythm.      Heart sounds: Normal heart sounds.   Pulmonary:      Effort: Pulmonary effort is normal.      Breath sounds: Examination of the right-lower field reveals decreased breath sounds. Examination of the left-lower field reveals decreased breath sounds. Decreased breath sounds present.   Abdominal:      General: Bowel sounds are normal.      Palpations: Abdomen is soft.   Musculoskeletal:         General: Normal range of motion.      Cervical back: Normal range of motion and neck supple.      Right lower leg: Edema present.   Skin:     General: Skin is warm and dry.   Neurological:      Mental Status: She is alert and oriented to person, place, and time.      Deep Tendon Reflexes: Reflexes  are normal and symmetric.   Psychiatric:         Behavior: Behavior normal.         Thought Content: Thought content normal.         Judgment: Judgment normal.         ED Course        Labs Reviewed   CBC WITH DIFFERENTIAL WITH PLATELET - Abnormal; Notable for the following components:       Result Value    WBC 3.7 (*)     Neutrophil Absolute Prelim 1.37 (*)     Neutrophil Absolute 1.37 (*)     All other components within normal limits   BASIC METABOLIC PANEL (8) - Abnormal; Notable for the following components:    Potassium 3.4 (*)     BUN/CREA Ratio 22.9 (*)     Calculated Osmolality 303 (*)     All other components within normal limits   TROPONIN I HIGH SENSITIVITY - Normal   BNP (B TYPE NATRIURETIC PEPTIDE) - Normal   RAPID SARS-COV-2 BY PCR - Normal     EKG    Rate, intervals and axes as noted on EKG Report.  Rate: 80  Rhythm: Sinus Rhythm  Reading: Right bundle branch block, no ST deviation.             Imaging Results Available and Reviewed while in ED: XR CHEST AP PORTABLE  (CPT=71045)    Result Date: 8/13/2024  CONCLUSION: No acute cardiopulmonary abnormality.   Dictated by (CST): Shane Yadav MD on 8/13/2024 at 2:11 PM     Finalized by (CST): Shane Yadav MD on 8/13/2024 at 2:11 PM         ED Medications Administered:   Medications   albuterol (Ventolin) (5 MG/ML) 0.5% nebulizer solution 10 mg (10 mg Nebulization Given 8/13/24 1355)   ipratropium (Atrovent) 0.02 % nebulizer solution 0.5 mg (0.5 mg Nebulization Given 8/13/24 1355)         MDM     Vitals:    08/13/24 1236 08/13/24 1430 08/13/24 1530   BP: 130/82 129/70 128/73   Pulse: 80 70 74   Resp: 20     Temp: 96.6 °F (35.9 °C)     TempSrc: Temporal     SpO2: 98% 100% 97%   Weight: 130.2 kg       *I personally reviewed and interpreted all ED vitals.  I also personally reviewed all labs and imaging if ordered    Pulse Ox: 100%, Room air, Normal     Monitor Interpretation:   normal sinus rhythm    Differential Diagnosis/ Diagnostic Considerations:  Bronchitis, chest wall pain, muscular pain, ACS.    Medical Record Review: I personally reviewed available prior medical records for any recent pertinent discharge summaries, testing, and procedures and reviewed those reports.    Complicating Factors: The patient already has does not have any pertinent problems on file. to contribute to the complexity of this ED evaluation.        Condition upon leaving the department: Stable    Disposition and Plan     Clinical Impression:  1. Chest tightness    2. Bronchitis        Disposition:  Discharge    Follow-up:  [facility]  [address]  [facility] [address]  [phone]  Schedule an appointment as soon as possible for a visit  If symptoms worsen      Medications Prescribed:  Current Discharge Medication List

## 2024-08-13 NOTE — ED INITIAL ASSESSMENT (HPI)
Chest pain/heaviness and shortness of breath since Friday. Was seen here yesterday for same. Told her doctor that the bouts of pain are lasting longer and there is now a cough and nausea present, pcp told her to come back

## 2024-08-21 ENCOUNTER — NURSE TRIAGE (OUTPATIENT)
Dept: INTERNAL MEDICINE CLINIC | Facility: CLINIC | Age: 54
End: 2024-08-21

## 2024-09-12 RX ORDER — FUROSEMIDE 20 MG/1
20 TABLET ORAL DAILY
Qty: 90 TABLET | Refills: 0 | Status: CANCELLED | OUTPATIENT
Start: 2024-09-12

## 2024-09-12 NOTE — TELEPHONE ENCOUNTER
Flonase: previously requested- we have not  prescribed fluticasone before. See patient message dated 09/05/2024 in encounters.

## 2024-09-12 NOTE — TELEPHONE ENCOUNTER
Patient requesting refill also requesting flonase but ness in medication lust.     [id number] - [place], [place] - [address] AT [facility] [address],       Medication Detail    Medication Quantity Refills Start End   furosemide 20 MG Oral Tab -- --  --   Route:   (none)     Class:   Historical     Order #:   [id number]

## 2024-09-16 ENCOUNTER — LAB ENCOUNTER (OUTPATIENT)
Dept: LAB | Age: 54
End: 2024-09-16
Attending: ADVANCED PRACTICE MIDWIFE
Payer: COMMERCIAL

## 2024-09-16 ENCOUNTER — OFFICE VISIT (OUTPATIENT)
Dept: INTERNAL MEDICINE CLINIC | Facility: CLINIC | Age: 54
End: 2024-09-16

## 2024-09-16 VITALS
WEIGHT: 288 LBS | HEIGHT: 64 IN | DIASTOLIC BLOOD PRESSURE: 88 MMHG | BODY MASS INDEX: 49.17 KG/M2 | OXYGEN SATURATION: 98 % | HEART RATE: 95 BPM | SYSTOLIC BLOOD PRESSURE: 150 MMHG

## 2024-09-16 DIAGNOSIS — Z11.3 ROUTINE SCREENING FOR STI (SEXUALLY TRANSMITTED INFECTION): ICD-10-CM

## 2024-09-16 DIAGNOSIS — M25.472 LEFT ANKLE SWELLING: ICD-10-CM

## 2024-09-16 DIAGNOSIS — R09.81 NASAL CONGESTION: ICD-10-CM

## 2024-09-16 DIAGNOSIS — I10 BENIGN ESSENTIAL HYPERTENSION: ICD-10-CM

## 2024-09-16 DIAGNOSIS — I10 BENIGN ESSENTIAL HYPERTENSION: Primary | ICD-10-CM

## 2024-09-16 LAB
HBV SURFACE AG SER-ACNC: <0.1 [IU]/L
HBV SURFACE AG SERPL QL IA: NONREACTIVE
HCV AB SERPL QL IA: NONREACTIVE
T PALLIDUM AB SER QL IA: NONREACTIVE

## 2024-09-16 PROCEDURE — 86803 HEPATITIS C AB TEST: CPT

## 2024-09-16 PROCEDURE — 87661 TRICHOMONAS VAGINALIS AMPLIF: CPT

## 2024-09-16 PROCEDURE — 99213 OFFICE O/P EST LOW 20 MIN: CPT

## 2024-09-16 PROCEDURE — 86780 TREPONEMA PALLIDUM: CPT

## 2024-09-16 PROCEDURE — 36415 COLL VENOUS BLD VENIPUNCTURE: CPT

## 2024-09-16 PROCEDURE — 87389 HIV-1 AG W/HIV-1&-2 AB AG IA: CPT

## 2024-09-16 PROCEDURE — 3008F BODY MASS INDEX DOCD: CPT

## 2024-09-16 PROCEDURE — 3077F SYST BP >= 140 MM HG: CPT

## 2024-09-16 PROCEDURE — 87591 N.GONORRHOEAE DNA AMP PROB: CPT

## 2024-09-16 PROCEDURE — 87340 HEPATITIS B SURFACE AG IA: CPT

## 2024-09-16 PROCEDURE — 3079F DIAST BP 80-89 MM HG: CPT

## 2024-09-16 PROCEDURE — 87491 CHLMYD TRACH DNA AMP PROBE: CPT

## 2024-09-16 RX ORDER — FUROSEMIDE 20 MG
20 TABLET ORAL DAILY PRN
Qty: 90 TABLET | Refills: 0 | Status: SHIPPED | OUTPATIENT
Start: 2024-09-16

## 2024-09-16 RX ORDER — FLUTICASONE PROPIONATE 50 MCG
2 SPRAY, SUSPENSION (ML) NASAL DAILY
Qty: 15.8 ML | Refills: 0 | Status: SHIPPED | OUTPATIENT
Start: 2024-09-16 | End: 2025-09-11

## 2024-09-16 NOTE — PROGRESS NOTES
Subjective:   Damari Waterman is a 54 year old female who presents for Follow - Up     Presents for follow-up   On furosemide as needed when leg swells   Usually takes 40mg   Has been out for about a week   Denies chest pain, shortness of breath, dizziness, blurred vision     Zepbound was around $1000 so did not pick it up     BP elevated in office today, does not have a machine at home so unable to monitor at home   Will refill furosemide and have her return in 1 week for a nurse visit to recheck her BP   If still elevated, will plan to go up on the carvedilol to 25mg BID     History/Other:    Chief Complaint Reviewed and Verified  No Further Nursing Notes to   Review  Tobacco Reviewed  Allergies Reviewed  Medications Reviewed    Problem List Reviewed  Medical History Reviewed  Surgical History   Reviewed  OB Status Reviewed  Family History Reviewed         Tobacco:  She has never smoked tobacco.    Current Outpatient Medications   Medication Sig Dispense Refill    furosemide 20 MG Oral Tab Take 1 tablet (20 mg total) by mouth daily as needed. 1-2 tablets as needed daily 90 tablet 0    fluticasone propionate 50 MCG/ACT Nasal Suspension 2 sprays by Each Nare route daily. 15.8 mL 0    estradiol 1 MG Oral Tab Take 1 tablet (1 mg total) by mouth daily. 90 tablet 1    predniSONE 5 MG Oral Tab Take 1 tablet (5 mg total) by mouth daily as needed. Take Prednisone 5 mg daily as needed for pain in the morning with food. 90 tablet 1    PANTOPRAZOLE 40 MG Oral Tab EC TAKE 1 TABLET(40 MG) BY MOUTH TWICE DAILY BEFORE MEALS 180 tablet 0    hydroxychloroquine 200 MG Oral Tab Take 2 tablets (400 mg total) by mouth daily. 180 tablet 2    naproxen 500 MG Oral Tab Take 1 tablet (500 mg total) by mouth 2 (two) times daily as needed.      carvedilol 12.5 MG Oral Tab Take 1 tablet (12.5 mg total) by mouth 2 (two) times daily with meals.      latanoprost 0.005 % Ophthalmic Solution Place 1 drop into the left eye nightly.       spironolactone 25 MG Oral Tab Take 1 tablet (25 mg total) by mouth daily.      Calcium Citrate-Vitamin D 200-6.25 MG-MCG Oral Tab Take 1 tablet by mouth daily.      ferrous sulfate 325 (65 FE) MG Oral Tab EC Take 1 tablet (325 mg total) by mouth daily with breakfast.      Multiple Vitamins-Minerals (MULTI-VITAMIN/MINERALS) Oral Tab Take 1 tablet by mouth daily.      acetaminophen 325 MG Oral Tab Take 1 tablet (325 mg total) by mouth every 6 (six) hours as needed for Pain.      PEG 3350-KCl-Na Bicarb-NaCl (TRILYTE) 420 g Oral Recon Soln Take prep as directed by gastro office. May substitute with Trilyte/generic equivalent if needed. 1 each 0    amLODIPine 5 MG Oral Tab       ergocalciferol 1.25 MG (36166 UT) Oral Cap Take 1 capsule (50,000 Units total) by mouth once a week.      losartan 100 MG Oral Tab Take 1 tablet (100 mg total) by mouth daily.      montelukast 10 MG Oral Tab Take 1 tablet (10 mg total) by mouth daily.      docusate sodium 100 MG Oral Cap Take 1 capsule (100 mg total) by mouth 2 (two) times daily. (Patient not taking: Reported on 7/31/2024)      polyethylene glycol, PEG 3350, 17 g Oral Powd Pack Take 17 g by mouth daily. (Patient not taking: Reported on 7/31/2024) 10 each 3    VENTOLIN  (90 Base) MCG/ACT Inhalation Aero Soln INHALE 2 PUFFS PO INTO THE LUNGS EVERY FOUR HOURS (Patient not taking: Reported on 7/31/2024)       Review of Systems:  Review of Systems  10 point review of systems otherwise negative with the exception of HPI and assessment and plan    Objective:   /88   Pulse 95   Ht 5' 4\" (1.626 m)   Wt 288 lb (130.6 kg)   SpO2 98%   BMI 49.44 kg/m²  Estimated body mass index is 49.44 kg/m² as calculated from the following:    Height as of this encounter: 5' 4\" (1.626 m).    Weight as of this encounter: 288 lb (130.6 kg).  Physical Exam  Vitals reviewed.   Constitutional:       General: She is not in acute distress.     Appearance: Normal appearance. She is  well-developed.   Cardiovascular:      Rate and Rhythm: Normal rate and regular rhythm.      Heart sounds: Normal heart sounds.   Pulmonary:      Effort: Pulmonary effort is normal.      Breath sounds: Normal breath sounds.   Skin:     General: Skin is warm and dry.   Neurological:      Mental Status: She is alert and oriented to person, place, and time.       Assessment & Plan:   1. Benign essential hypertension (Primary)  -     Furosemide; Take 1 tablet (20 mg total) by mouth daily as needed. 1-2 tablets as needed daily  Dispense: 90 tablet; Refill: 0  2. Nasal congestion  -     Fluticasone Propionate; 2 sprays by Each Nare route daily.  Dispense: 15.8 mL; Refill: 0  3. Left ankle swelling  -     Furosemide; Take 1 tablet (20 mg total) by mouth daily as needed. 1-2 tablets as needed daily  Dispense: 90 tablet; Refill: 0  Will provide information for Dr. Wilcox for weight loss    MANUEL Warren, 9/16/2024, 2:26 PM

## 2024-09-17 LAB
C TRACH DNA SPEC QL NAA+PROBE: NEGATIVE
N GONORRHOEA DNA SPEC QL NAA+PROBE: NEGATIVE

## 2024-09-19 LAB — TRICH VAG NAA: NEGATIVE

## 2024-09-20 RX ORDER — FUROSEMIDE 20 MG
TABLET ORAL
Qty: 180 TABLET | Refills: 0 | OUTPATIENT
Start: 2024-09-20

## 2024-09-20 NOTE — TELEPHONE ENCOUNTER
Furosemide 20mg: short supply given patient to follow up in 1 week from 09/16/2024.  Sent 90 tablets on 09/16/2024 to [facility] in [place]

## 2024-09-23 ENCOUNTER — NURSE ONLY (OUTPATIENT)
Dept: INTERNAL MEDICINE CLINIC | Facility: CLINIC | Age: 54
End: 2024-09-23

## 2024-09-23 VITALS — DIASTOLIC BLOOD PRESSURE: 72 MMHG | HEART RATE: 81 BPM | SYSTOLIC BLOOD PRESSURE: 121 MMHG

## 2024-09-23 DIAGNOSIS — Z01.30 BLOOD PRESSURE CHECK: Primary | ICD-10-CM

## 2024-09-23 PROCEDURE — 3078F DIAST BP <80 MM HG: CPT | Performed by: INTERNAL MEDICINE

## 2024-09-23 PROCEDURE — 3074F SYST BP LT 130 MM HG: CPT | Performed by: INTERNAL MEDICINE

## 2024-09-26 ENCOUNTER — OFFICE VISIT (OUTPATIENT)
Dept: RHEUMATOLOGY | Facility: CLINIC | Age: 54
End: 2024-09-26

## 2024-09-26 VITALS
SYSTOLIC BLOOD PRESSURE: 113 MMHG | RESPIRATION RATE: 16 BRPM | WEIGHT: 288 LBS | DIASTOLIC BLOOD PRESSURE: 74 MMHG | HEART RATE: 93 BPM | HEIGHT: 64 IN | BODY MASS INDEX: 49.17 KG/M2

## 2024-09-26 DIAGNOSIS — M06.4 INFLAMMATORY POLYARTHRITIS (HCC): ICD-10-CM

## 2024-09-26 DIAGNOSIS — L93.0 DISCOID LUPUS: Primary | ICD-10-CM

## 2024-09-26 DIAGNOSIS — R21 RASH: ICD-10-CM

## 2024-09-26 PROCEDURE — G2211 COMPLEX E/M VISIT ADD ON: HCPCS | Performed by: INTERNAL MEDICINE

## 2024-09-26 PROCEDURE — 99214 OFFICE O/P EST MOD 30 MIN: CPT | Performed by: INTERNAL MEDICINE

## 2024-09-26 PROCEDURE — 3074F SYST BP LT 130 MM HG: CPT | Performed by: INTERNAL MEDICINE

## 2024-09-26 PROCEDURE — 3078F DIAST BP <80 MM HG: CPT | Performed by: INTERNAL MEDICINE

## 2024-09-26 PROCEDURE — 3008F BODY MASS INDEX DOCD: CPT | Performed by: INTERNAL MEDICINE

## 2024-09-26 RX ORDER — PREDNISONE 5 MG/1
TABLET ORAL
Qty: 60 TABLET | Refills: 0 | Status: SHIPPED | OUTPATIENT
Start: 2024-09-26 | End: 2024-11-06

## 2024-09-26 NOTE — PROGRESS NOTES
RHEUMATOLOGY CLINIC- FOLLOW UP    Damari Waterman is a 54 year old female.    ASSESSMENT/PLAN:       ICD-10-CM    1. 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 Inflammatory polyarthritis (HCC)  M06.4       3. Rash  R21           DISCUSSION:  Patient presents for follow-up of inflammatory polyarthritis in the setting of history of discoid lupus, formally following with [facility] rheumatology for discoid lupus (per patient, biopsy-proven since childhood).  Subsequent lab workup notable for only low positive SHARAD with negative dsDNA and normal complements.  Normal RF/CCP.  Patient plans to schedule evaluation with dermatology and ophthalmology.  Given her previous synovitis which has overall improved on hydroxychloroquine, I am suspicious of an underlying inflammatory arthropathy: Seronegative RA versus SLE associated inflammatory arthritis versus even possible hidradenitis suppurativa associate inflammatory arthropathy given her recurrent boils.  Would appreciate dermatology thoughts and recommendations on this.    PLAN:  -Continue hydroxychloroquine 400 mg daily (less than 5 mg/kg).  Discussed with patient can divide up doses and to 200 mg twice daily if GI upset.       -Previously discussed with patient side effects of Plaquenil which include but are not limited to retinopathy skin changes, blood abnormalities, hepatotoxicity, cardiotoxicity, neuro changes such as dizziness, fatigue, irritability, myopathy.  Pending patient response, patient will require annual Plaquenil eye exams if continued on chronic Plaquenil.  -P.o. prednisone taper x 12 days.  Afterwards, patient can take up to 5 mg daily with breakfast as needed for breakthrough pain.  - Referral reprinted for dermatology and ophthalmology  - Consult/evaluation communicated with referring physician/provider.    Patient to follow-up in about 5 months    Isela Trimble DO  9/26/2024   4:59 PM    There is a longitudinal care relationship with me, the care plan reflects  the ongoing nature of the continuous relationship of care, and the medical record indicates that there is ongoing treatment of a serious/complex medical condition which I am currently managing.  is Applicable.       Discussed with patient that they are on chronic treatment with a high-risk medication that requires close lab monitoring for possible drug toxicity.  Additionally, discussed with patient give the possible immunosuppressive effects of their medication as well as their underlying hyper-inflammatory state, the importance of keeping vaccinations and age-appropriate screenings up-to-date, given increased risk of complications and malignancies seen in these patient populations.  Patient to f/u with repeat labs drawn prior (standing labs ordered).  Last QuantiFERON TB testin2024  Last Hepatitis testin2024    SUBJECTIVE:     2024 follow-up:  Patient notes significant improvement in her polyarthralgia since starting hydroxychloroquine.  However, feels that her boils are getting worse.  Notes that these boils seem different than her previous discoid lupus lesions.      Current Medications:  Hydroxychloroquine 400 mg daily-resumed 2024    Medication History:  Naproxen 500 mg twice daily    Dapsone-ineffective  Pimecrolimus  PO Methotrexate (can't remember side effects) > Subcutaneous methotrexate 20 mg once weekly-insurance issues per notes. Patient noting skin burning   Leflunomide 20 mg daily-polyarthralgias, uncontrolled skin disease    Interval History:     2024 initial consult: I had the pleasure of seeing Damari Waterman on 2024 as a new outpatient consultation for Discoid Lupus. The patient was originally referred by her PCP, Dr. Flor Jenkins.     54 year old female w/ PMH Discoid SLE, Glaucoma, HTN, asthma, RLS, GERD, ABAD, pre-diabetes who presents to clinic today.Note, patient formally following with [facility] rheumatology, Dr. Connor, with last  appointment 9/20/2023.  During that appointment, patient presenting with history of discoid lupus since she was 10 years old, on hydroxychloroquine 200 mg twice daily for 5 years.  However, noting progression in her skin lesions including: Nose, face.  Using topical tacrolimus as needed per her dermatologist.  No other history of other organ involvement.  Was taking leflunomide at the time with progressive arthralgias and new skin lesions.  Suspected flare of symptoms as being off dapsone versus viral syndrome versus due to her multiple boils.  Recommended dapsone 100 mg daily and a Medrol dose taper.  Was recommended to continue leflunomide.  Possible swab to one of the lesions to rule out MRSA.    Patient presents as a new consult today to establish care.  States she stopped hydroxychloroquine about a year ago and has tried multiple different DMARDs but is unsure regarding whether she experienced side effects and why medications were stopped/modified.  Had a reportedly normal eye exam in January.  Was prescribed a prednisone taper x 5 days with improvement of her polyarthralgias.  Notes morning stiffness as well as associated joint pain with swelling.  ROS also positive for frequent chills, hair loss, episodic ulcers in her nose/mouth, as well as burning/itching sensation in the sun.  Also notes persistence of the skin lesions which can affect her armpits, under her breasts, groin.  No measured fevers, history of uveitis/iritis, pleurisy, Raynaud's phenomenon.  Notes sicca symptoms.  Family history notable for father with RA.  HISTORY:  Past Medical History:    Acute bilateral low back pain    Anemia    Asthma (HCC)    Bronchitis    Essential hypertension    Glaucoma    High blood pressure    Hypokalemia    Interscapular pain    Last Assessment & Plan:    Formatting of this note might be different from the original.   Patient reports pain is a 2/10 today.      Lupus (HCC)    Neck pain    Last Assessment & Plan:     Formatting of this note might be different from the original.   Patient reports pain is a 2/10 today.      Prediabetes    Right otitis media    Sleep apnea    Slow transit constipation    Type 2 diabetes mellitus without complication (HCC)      Social Hx Reviewed   Family Hx Reviewed     Medications (Active prior to today's visit):  Current Outpatient Medications   Medication Sig Dispense Refill    furosemide 20 MG Oral Tab Take 1 tablet (20 mg total) by mouth daily as needed. 1-2 tablets as needed daily 90 tablet 0    fluticasone propionate 50 MCG/ACT Nasal Suspension 2 sprays by Each Nare route daily. 15.8 mL 0    estradiol 1 MG Oral Tab Take 1 tablet (1 mg total) by mouth daily. 90 tablet 1    predniSONE 5 MG Oral Tab Take 1 tablet (5 mg total) by mouth daily as needed. Take Prednisone 5 mg daily as needed for pain in the morning with food. 90 tablet 1    PANTOPRAZOLE 40 MG Oral Tab EC TAKE 1 TABLET(40 MG) BY MOUTH TWICE DAILY BEFORE MEALS 180 tablet 0    hydroxychloroquine 200 MG Oral Tab Take 2 tablets (400 mg total) by mouth daily. 180 tablet 2    naproxen 500 MG Oral Tab Take 1 tablet (500 mg total) by mouth 2 (two) times daily as needed.      carvedilol 12.5 MG Oral Tab Take 1 tablet (12.5 mg total) by mouth 2 (two) times daily with meals.      latanoprost 0.005 % Ophthalmic Solution Place 1 drop into the left eye nightly.      spironolactone 25 MG Oral Tab Take 1 tablet (25 mg total) by mouth daily.      Calcium Citrate-Vitamin D 200-6.25 MG-MCG Oral Tab Take 1 tablet by mouth daily.      docusate sodium 100 MG Oral Cap Take 1 capsule (100 mg total) by mouth 2 (two) times daily. (Patient not taking: Reported on 7/31/2024)      ferrous sulfate 325 (65 FE) MG Oral Tab EC Take 1 tablet (325 mg total) by mouth daily with breakfast.      Multiple Vitamins-Minerals (MULTI-VITAMIN/MINERALS) Oral Tab Take 1 tablet by mouth daily.      acetaminophen 325 MG Oral Tab Take 1 tablet (325 mg total) by mouth every 6 (six)  hours as needed for Pain.      polyethylene glycol, PEG 3350, 17 g Oral Powd Pack Take 17 g by mouth daily. (Patient not taking: Reported on 7/31/2024) 10 each 3    PEG 3350-KCl-Na Bicarb-NaCl (TRILYTE) 420 g Oral Recon Soln Take prep as directed by gastro office. May substitute with Trilyte/generic equivalent if needed. 1 each 0    VENTOLIN  (90 Base) MCG/ACT Inhalation Aero Soln INHALE 2 PUFFS PO INTO THE LUNGS EVERY FOUR HOURS (Patient not taking: Reported on 7/31/2024)      amLODIPine 5 MG Oral Tab       ergocalciferol 1.25 MG (69140 UT) Oral Cap Take 1 capsule (50,000 Units total) by mouth once a week.      losartan 100 MG Oral Tab Take 1 tablet (100 mg total) by mouth daily.      montelukast 10 MG Oral Tab Take 1 tablet (10 mg total) by mouth daily.         Allergies:  No Known Allergies      ROS:   Review of Systems   Constitutional:  Negative for chills and fever.   HENT:  Negative for congestion, hearing loss, mouth sores, nosebleeds and trouble swallowing.    Eyes:  Negative for photophobia, pain, redness and visual disturbance.   Respiratory:  Negative for cough and shortness of breath.    Cardiovascular:  Negative for chest pain, palpitations and leg swelling.   Gastrointestinal:  Negative for abdominal pain, blood in stool, diarrhea and nausea.   Endocrine: Negative for cold intolerance and heat intolerance.   Genitourinary:  Negative for dysuria, frequency and hematuria.   Musculoskeletal:  Negative for arthralgias, back pain, gait problem, joint swelling, myalgias, neck pain and neck stiffness.   Skin:  Positive for wound. Negative for color change and rash.   Neurological:  Negative for dizziness, weakness, numbness and headaches.   Psychiatric/Behavioral:  Negative for confusion and dysphoric mood.         PHYSICAL EXAM:     Constitutional:  Well developed, Well nourished, No acute distress  HENT:  Normocephalic, Atraumatic, Bilateral external ears normal, Oropharynx moist, No oral  exudates.  Neck: Normal range of motion, No tenderness, Supple, No stridor.  Eyes:  PERRL, EOMI, Conjunctiva normal, No discharge.  Respiratory:  Normal breath sounds, No respiratory distress, No wheezing.  Cardiovascular:  Normal heart rate, Normal rhythm, No murmurs, No rubs, No gallops.  GI:  Bowel sounds normal, Soft, No tenderness, No masses, No pulsatile masses.  : No CVA tenderness.  Musculoskeletal:  A comprehensive 28 count joint exam was done and was negative for swelling or tenderness except as noted. Inspections for misalignment, asymmetry, crepitation, defects, tenderness, masses, nodules, effusions, range of motion, and stability in the upper and lower extremities bilaterally are all normal unless noted.           Integument:  Warm, Dry, No erythema  Lymphatic:  No lymphadenopathy noted.  Neurologic:  Alert & oriented x 3, Normal motor function, Normal sensory function, No focal deficits noted.  Psychiatric:  Affect normal, Judgment normal, Mood normal.    LABS:     Lab work reviewed and notable for:    8/13/2024:  BMP with BUN 22, CR 0.96  CBC with WBC 3.7 (borderline low), normal Hg/PLT    5/21/2024:  SHARAD 1: 80 homogenous with negative reflex antibodies, dsDNA by IFA less than 10 WNL  C3 111.9 WNL, C4 27.8 WNL  ESR 41 (high), CRP less than 0.4 WNL  RF less than 10 WNL, CCP 2.2 WNL    Quant TB and acute hepatitis panel negative    3/17/2024:  CMP with normal renal function, borderline alk phos 109 other LFTs WNL, no gamma gap noted  CBC without cytopenias or leukocytosis    2/14/2024:  dsDNA negative by crithidia IFA  C3 138 WNL, C4 40 WNL  CRP less than 1, ESR 43 (high)    11/10/2023:  UA negative blood/10 protein    3/21/2023:  G6PD 18 WNL  Imaging:     N/A

## 2024-10-04 ENCOUNTER — TELEPHONE (OUTPATIENT)
Dept: RHEUMATOLOGY | Facility: CLINIC | Age: 54
End: 2024-10-04

## 2024-10-04 ENCOUNTER — TELEPHONE (OUTPATIENT)
Dept: INTERNAL MEDICINE CLINIC | Facility: CLINIC | Age: 54
End: 2024-10-04

## 2024-10-04 NOTE — TELEPHONE ENCOUNTER
Patient called (identified name and ),   Wanted the number to call for referral to Behavioral Health.  Decided to go forward with it.  Gave her number to call:  _______________________________________________________  Referred to Provider Information:  Provider Address Phone   Lauren Graf, [address] [address] [phone]        Patient stated understanding.

## 2024-10-04 NOTE — TELEPHONE ENCOUNTER
Patient calling to ask referral for Dr. Ribeiro be faxed to [phone], has appointment on 01/08/25.

## 2024-10-11 DIAGNOSIS — R09.81 NASAL CONGESTION: ICD-10-CM

## 2024-10-14 NOTE — TELEPHONE ENCOUNTER
Refill passed per [facility] protocol.    Short supply given for congestion- please advise if refill is appropriate.     Requested Prescriptions   Pending Prescriptions Disp Refills    FLUTICASONE PROPIONATE 50 MCG/ACT Nasal Suspension [Pharmacy Med Name: FLUTICASONE 50MCG NASAL SP (120) RX] 16 g 0     Sig: SHAKE LIQUID AND USE 2 SPRAYS IN EACH NOSTRIL DAILY       Allergy Medication Protocol Passed - 10/14/2024  4:23 PM        Passed - In person appointment or virtual visit in the past 12 mos or appointment in next 3 mos     Recent Outpatient Visits              2 weeks ago Discoid lupus    [facility] Isela Trimble DO    Office Visit    3 weeks ago Blood pressure check    [facility]    Nurse Only    4 weeks ago Benign essential hypertension    [facility] Mann, Susan, APRN    Office Visit    2 months ago Encounter for gynecological examination without abnormal finding    [facility] Ela Mccabe CNM    Office Visit    2 months ago Obesity, Class III, BMI 40-49.9 (morbid obesity) ([facility])    [facility] Mann, Susan, APRN    Office Visit          Future Appointments         Provider Department Appt Notes    In 1 week Edvin Pearson MD [facility] referrd by pcp - lupus    In 3 months STEARNS, PROCEDURE [facility] Colon/EGD @ [facility]    In 3 months Ela Mccabe CNM [facility] f/u    In 4 months Isela Trimble DO [facility] 5 month follow up policy informed to pt                       Future Appointments         Provider Department Appt Notes    In [facility] week [facility]    In 3 months STEARNS, PROCEDURE [facility] [place] [place] Colon/EGD @ [facility]    In 3 months Ela Mccabe CNM [facility] [place] [place] - [facility] f/u    In 4 months Isela Trimble DO [facility] [place] [place] 5 month follow up policy informed to pt          Recent Outpatient Visits              [facility]    [facility] Visit    3 weeks ago Blood pressure check    [facility] [place]    Nurse Only    4 weeks ago Benign essential hypertension    [facility] [place]    Office Visit    2 months ago Encounter for gynecological examination without abnormal finding    [facility] [place] [place] - [facility] Ela Mccabe CNM    Office Visit    2 months ago Obesity, Class III, BMI 40-49.9 (morbid obesity) ([facility])    [facility] [place]    Office Visit

## 2024-10-16 RX ORDER — FLUTICASONE PROPIONATE 50 MCG
2 SPRAY, SUSPENSION (ML) NASAL DAILY
Qty: 48 G | Refills: 0 | Status: SHIPPED | OUTPATIENT
Start: 2024-10-16

## 2024-10-21 RX ORDER — PANTOPRAZOLE SODIUM 40 MG/1
40 TABLET, DELAYED RELEASE ORAL
Qty: 180 TABLET | Refills: 0 | Status: SHIPPED | OUTPATIENT
Start: 2024-10-21

## 2024-10-21 NOTE — TELEPHONE ENCOUNTER
Requested Prescriptions     Pending Prescriptions Disp Refills    PANTOPRAZOLE 40 MG Oral Tab EC [Pharmacy Med Name: PANTOPRAZOLE 40MG TABLETS] 180 tablet 0     Sig: TAKE 1 TABLET(40 MG) BY MOUTH TWICE DAILY BEFORE MEALS         LOV  5/6/2024      LR   7/3/2024       [facility]

## 2024-10-22 RX ORDER — PANTOPRAZOLE SODIUM 40 MG/1
40 TABLET, DELAYED RELEASE ORAL
Qty: 180 TABLET | Refills: 0 | OUTPATIENT
Start: 2024-10-22

## 2024-10-22 NOTE — TELEPHONE ENCOUNTER
Requested Prescriptions     Pending Prescriptions Disp Refills    PANTOPRAZOLE 40 MG Oral Tab EC [Pharmacy Med Name: PANTOPRAZOLE 40MG TABLETS] 180 tablet 0     Sig: TAKE 1 TABLET(40 MG) BY MOUTH TWICE DAILY BEFORE MEALS       PANTOPRAZOLE 40 MG Oral Tab  tablet 0 10/21/2024         DUPLICATE

## 2024-10-24 ENCOUNTER — LAB ENCOUNTER (OUTPATIENT)
Dept: LAB | Age: 54
End: 2024-10-24
Attending: STUDENT IN AN ORGANIZED HEALTH CARE EDUCATION/TRAINING PROGRAM
Payer: COMMERCIAL

## 2024-10-24 ENCOUNTER — OFFICE VISIT (OUTPATIENT)
Dept: DERMATOLOGY CLINIC | Facility: CLINIC | Age: 54
End: 2024-10-24
Payer: COMMERCIAL

## 2024-10-24 ENCOUNTER — TELEPHONE (OUTPATIENT)
Dept: DERMATOLOGY CLINIC | Facility: CLINIC | Age: 54
End: 2024-10-24

## 2024-10-24 DIAGNOSIS — L73.2 HIDRADENITIS: Primary | ICD-10-CM

## 2024-10-24 DIAGNOSIS — Z51.81 MEDICATION MONITORING ENCOUNTER: ICD-10-CM

## 2024-10-24 DIAGNOSIS — L73.2 HIDRADENITIS: ICD-10-CM

## 2024-10-24 LAB
HAV AB SER QL IA: NONREACTIVE
HBV CORE AB SERPL QL IA: NONREACTIVE
HBV SURFACE AB SER QL: NONREACTIVE
HBV SURFACE AB SERPL IA-ACNC: <3.1 MIU/ML
HBV SURFACE AG SERPL QL IA: NONREACTIVE
HCV AB SERPL QL IA: NONREACTIVE

## 2024-10-24 PROCEDURE — 86704 HEP B CORE ANTIBODY TOTAL: CPT

## 2024-10-24 PROCEDURE — 86803 HEPATITIS C AB TEST: CPT

## 2024-10-24 PROCEDURE — 86480 TB TEST CELL IMMUN MEASURE: CPT

## 2024-10-24 PROCEDURE — 86708 HEPATITIS A ANTIBODY: CPT

## 2024-10-24 PROCEDURE — 86706 HEP B SURFACE ANTIBODY: CPT

## 2024-10-24 PROCEDURE — 87340 HEPATITIS B SURFACE AG IA: CPT

## 2024-10-24 PROCEDURE — 36415 COLL VENOUS BLD VENIPUNCTURE: CPT

## 2024-10-24 PROCEDURE — 80503 PATH CLIN CONSLTJ SF 5-20: CPT

## 2024-10-24 PROCEDURE — 99204 OFFICE O/P NEW MOD 45 MIN: CPT | Performed by: STUDENT IN AN ORGANIZED HEALTH CARE EDUCATION/TRAINING PROGRAM

## 2024-10-24 RX ORDER — BENZOYL PEROXIDE 10 G/100G
SUSPENSION TOPICAL
Qty: 227 G | Refills: 3 | Status: SHIPPED | OUTPATIENT
Start: 2024-10-24

## 2024-10-24 RX ORDER — CLINDAMYCIN PHOSPHATE 10 UG/ML
LOTION TOPICAL
Qty: 60 ML | Refills: 11 | Status: SHIPPED | OUTPATIENT
Start: 2024-10-24

## 2024-10-24 RX ORDER — PREDNISONE 5 MG/1
5 TABLET ORAL DAILY
COMMUNITY

## 2024-10-24 NOTE — PROGRESS NOTES
October 24, 2024    New patient     Referred by: Susan JACKSON    CHIEF COMPLAINT: boils pt with hx discoid lupus    HISTORY OF PRESENT ILLNESS: .    1. boils  Location: axillae, groin, inner thighs, chest, abdomen   Duration: chronic  Signs and symptoms: painful  Current treatment: none  Past treatments: none        DERM HISTORY:  History of skin cancer: No  History of chronic skin disease/condition: Yes    FAMILY HISTORY:  History of melanoma: No  History of chronic skin disease/condition: No    History/Other:    REVIEW OF SYSTEMS:  Constitutional: Denies fever, chills, unintentional weight loss.   Skin as per HPI    PAST MEDICAL HISTORY:  Past Medical History:    Acute bilateral low back pain    Anemia    Asthma (HCC)    Bronchitis    Essential hypertension    Glaucoma    High blood pressure    Hypokalemia    Interscapular pain    Last Assessment & Plan:    Formatting of this note might be different from the original.   Patient reports pain is a 2/10 today.      Lupus    Neck pain    Last Assessment & Plan:    Formatting of this note might be different from the original.   Patient reports pain is a 2/10 today.      Prediabetes    Right otitis media    Sleep apnea    Slow transit constipation    Type 2 diabetes mellitus without complication ([facility])       Medications  Current Outpatient Medications   Medication Sig Dispense Refill    predniSONE 5 MG Oral Tab Take 1 tablet (5 mg total) by mouth daily. PRN      PANTOPRAZOLE 40 MG Oral Tab EC TAKE 1 TABLET(40 MG) BY MOUTH TWICE DAILY BEFORE MEALS 180 tablet 0    fluticasone propionate 50 MCG/ACT Nasal Suspension 2 sprays by Nasal route daily. 48 g 0    furosemide 20 MG Oral Tab Take 1 tablet (20 mg total) by mouth daily as needed. 1-2 tablets as needed daily 90 tablet 0    estradiol 1 MG Oral Tab Take 1 tablet (1 mg total) by mouth daily. 90 tablet 1    hydroxychloroquine 200 MG Oral Tab Take 2 tablets (400 mg total) by mouth daily. 180 tablet 2    naproxen 500 MG  Oral Tab Take 1 tablet (500 mg total) by mouth 2 (two) times daily as needed.      carvedilol 12.5 MG Oral Tab Take 1 tablet (12.5 mg total) by mouth 2 (two) times daily with meals.      latanoprost 0.005 % Ophthalmic Solution Place 1 drop into the left eye nightly.      spironolactone 25 MG Oral Tab Take 1 tablet (25 mg total) by mouth daily.      Calcium Citrate-Vitamin D 200-6.25 MG-MCG Oral Tab Take 1 tablet by mouth daily.      ferrous sulfate 325 (65 FE) MG Oral Tab EC Take 1 tablet (325 mg total) by mouth daily with breakfast.      Multiple Vitamins-Minerals (MULTI-VITAMIN/MINERALS) Oral Tab Take 1 tablet by mouth daily.      acetaminophen 325 MG Oral Tab Take 1 tablet (325 mg total) by mouth every 6 (six) hours as needed for Pain.      amLODIPine 5 MG Oral Tab       ergocalciferol 1.25 MG (51077 UT) Oral Cap Take 1 capsule (50,000 Units total) by mouth once a week.      losartan 100 MG Oral Tab Take 1 tablet (100 mg total) by mouth daily.      montelukast 10 MG Oral Tab Take 1 tablet (10 mg total) by mouth daily.      predniSONE 5 MG Oral Tab Take 4 tablets (20 mg total) by mouth daily for 3 days, THEN 3 tablets (15 mg total) daily for 3 days, THEN 2 tablets (10 mg total) daily for 3 days, THEN 1 tablet (5 mg total) daily for 3 days, THEN 1 tablet (5 mg total) daily as needed. Take as instructed in the morning with breakfast for 12 days.  Then, after 12 days, only take prednisone up to 1 tablet daily as needed for breakthrough pain.. (Patient not taking: Reported on 10/24/2024) 60 tablet 0    PEG 3350-KCl-Na Bicarb-NaCl (TRILYTE) 420 g Oral Recon Soln Take prep as directed by gastro office. May substitute with Trilyte/generic equivalent if needed. (Patient not taking: Reported on 10/24/2024) 1 each 0       Objective:    PHYSICAL EXAM:  General: awake, alert, no acute distress  Skin: Skin exam was performed today including the following: inner thighs and axillae. Pertinent findings include:   - with skin  colored nodules, tender to touch    ASSESSMENT & PLAN:  Pathophysiology of diagnoses discussed with patient.  Therapeutic options reviewed. Risks, benefits, and alternatives discussed with patient. Instructions reviewed at length.    #Hidradenitis Suppurativa - given extent and severity recommended Cosentyx today. Alvarez stage 3  - Apply clindamycin 1% lotion twice daily to affected areas with flares. Use benzoyl peroxide wash once daily while using this medication. Purchase an over the counter acne wash containing 3-6% benzoyl peroxide. (Examples: Cerave Acne Wash, Cerave Acne Foaming Cream Cleanser, PanOxyl). If you have difficulty finding one, ask your pharmacist for assistance. Use to affected areas. Be sure to rinse thoroughly, as medication may bleach clothing, towels and hair.  - For treatment of severe HS, recommend Cosentyx 300 mg once weekly at weeks 0, 1, 2, 3, and 4 followed by 300 mg every 4 weeks.  - Reviewed risks of therapy including but not limited to: immunosuppression, upper respiratory infections, serious infection including tuberculosis, invasive fungal infections; hypersensitivity reactions; exacerbation of Crohn's disease; avoidance of live virus vaccines. Theoretical risk of malignancy, hepatotoxicity or cytopenias. Common adverse events include candidiasis and injection site reactions. Patient instructed to contact our clinic if notices any side effects from the medication.  - Will submit today for approval from patient's insurance company.    High Risk Medication Monitoring: Cosentyx  - TB serology today, will repeat annually  - Flu shot yearly  - No live vaccines  - Hepatitis serology (hep B, hep C)   - Patient to hold dose if ill   - Discussed risk of Crohn's disease exacerbation - no personal/family history of IBD      #Discoid Lupus  #Inflammatory arthritis   - Managed by rheumatology and currently on plaquenil and prednisone    Return to clinic: 3 months or sooner if something  concerning arises     Edvin Pearson MD

## 2024-10-26 DIAGNOSIS — M25.472 LEFT ANKLE SWELLING: ICD-10-CM

## 2024-10-26 DIAGNOSIS — I10 BENIGN ESSENTIAL HYPERTENSION: ICD-10-CM

## 2024-10-28 LAB
M TB IFN-G CD4+ T-CELLS BLD-ACNC: 0.03 IU/ML
M TB TUBERC IFN-G BLD QL: NEGATIVE
M TB TUBERC IGNF/MITOGEN IGNF CONTROL: >10 IU/ML
QFT TB1 AG MINUS NIL: 0.01 IU/ML
QFT TB2 AG MINUS NIL: -0.01 IU/ML

## 2024-10-28 NOTE — PROGRESS NOTES
[facility], chart notes, and TB results faxed to Hattie at [facility] on 10/28/24.

## 2024-10-28 NOTE — TELEPHONE ENCOUNTER
Cosentyx enrollment forms, chart notes, and TB results faxed to Hattie at [facility] on 10/28/24.  Awaiting determination.

## 2024-10-29 NOTE — TELEPHONE ENCOUNTER
Fax from [facility]- receipt of referral received. Insurance verification process started.

## 2024-10-30 DIAGNOSIS — I10 BENIGN ESSENTIAL HYPERTENSION: ICD-10-CM

## 2024-10-30 DIAGNOSIS — M25.472 LEFT ANKLE SWELLING: ICD-10-CM

## 2024-10-30 RX ORDER — FUROSEMIDE 20 MG/1
TABLET ORAL
Qty: 90 TABLET | Refills: 0 | Status: SHIPPED | OUTPATIENT
Start: 2024-10-30

## 2024-11-01 ENCOUNTER — TELEPHONE (OUTPATIENT)
Dept: INTERNAL MEDICINE CLINIC | Facility: CLINIC | Age: 54
End: 2024-11-01

## 2024-11-01 DIAGNOSIS — E66.01 OBESITY, CLASS III, BMI 40-49.9 (MORBID OBESITY) (HCC): Primary | ICD-10-CM

## 2024-11-01 NOTE — TELEPHONE ENCOUNTER
The ozempic was not effective as she only lost 7lbs in over 6 months, so I would not put her back on this. She can discuss plan when she meets with the weight loss doctor

## 2024-11-01 NOTE — TELEPHONE ENCOUNTER
ETHAN Warren =see below, pended referral and advise regarding Ozempic refill.       Per patient, she has been on Ozempic before prescribed by a different provider but refilled by ETHAN Warren.     Zepbound is NOT covered by her insurance.  She still has 2 left over Ozempic 2 mg, she will need a refill.   Instructed to contact Dr Lopez' office and schedule an appointment.  In the meantime, RN will send the message to Susan if she agrees to refill the Ozempic.      OFFICE VISIT 9/16/24;  Zepbound was around $1000 so did not pick it up   Will provide information for Dr. Wilcox for weight loss     Future Appointments   Date Time Provider Department Center   1/15/2025 10:55 AM DARYN, PROCEDURE [id number] None   1/23/2025 11:00 AM Edvin Pearson MD E[facility]M [facility]   1/29/2025  9:45 AM Ela Mccabe CNM E[facility]HMWF [facility]   3/5/2025 10:30 AM Isela Trimble DO E[facility]HRHEUM [facility]

## 2024-11-01 NOTE — TELEPHONE ENCOUNTER
Patient is calling to advise she would like to resume the Ozempic medication   Patient mentions the Mounjaro was not approved.    Patient mentions she does not need a refill.

## 2024-11-03 RX ORDER — FUROSEMIDE 20 MG/1
TABLET ORAL
Qty: 180 TABLET | Refills: 0 | OUTPATIENT
Start: 2024-11-03

## 2024-11-04 NOTE — TELEPHONE ENCOUNTER
PA submitted via Counts include 234 beds at the [facility] on 11/4/24.  Hattie from [facility] provided with an update.

## 2024-11-18 ENCOUNTER — NURSE ONLY (OUTPATIENT)
Dept: DERMATOLOGY CLINIC | Facility: CLINIC | Age: 54
End: 2024-11-18

## 2024-11-18 DIAGNOSIS — L73.2 HIDRADENITIS SUPPURATIVA: Primary | ICD-10-CM

## 2024-11-18 PROBLEM — R73.03 PREDIABETES: Status: ACTIVE | Noted: 2024-11-18

## 2024-11-18 PROBLEM — H40.9 GLAUCOMA OF LEFT EYE: Status: ACTIVE | Noted: 2024-11-18

## 2024-11-19 DIAGNOSIS — E89.41 SURGICAL MENOPAUSE, SYMPTOMATIC: ICD-10-CM

## 2024-11-20 RX ORDER — ESTRADIOL 1 MG/1
1 TABLET ORAL DAILY
Qty: 90 TABLET | Refills: 1 | OUTPATIENT
Start: 2024-11-20

## 2024-11-26 ENCOUNTER — TELEPHONE (OUTPATIENT)
Dept: INTERNAL MEDICINE CLINIC | Facility: CLINIC | Age: 54
End: 2024-11-26

## 2024-11-27 ENCOUNTER — APPOINTMENT (OUTPATIENT)
Dept: CT IMAGING | Facility: HOSPITAL | Age: 54
End: 2024-11-27
Attending: EMERGENCY MEDICINE
Payer: COMMERCIAL

## 2024-11-27 ENCOUNTER — HOSPITAL ENCOUNTER (EMERGENCY)
Facility: HOSPITAL | Age: 54
Discharge: HOME | End: 2024-11-27
Attending: EMERGENCY MEDICINE
Payer: COMMERCIAL

## 2024-11-27 VITALS
SYSTOLIC BLOOD PRESSURE: 138 MMHG | HEART RATE: 77 BPM | OXYGEN SATURATION: 99 % | HEIGHT: 64 IN | DIASTOLIC BLOOD PRESSURE: 79 MMHG | TEMPERATURE: 97 F | RESPIRATION RATE: 18 BRPM | BODY MASS INDEX: 49 KG/M2 | WEIGHT: 287 LBS
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
## 2024-11-27 DIAGNOSIS — S09.90XA INJURY OF HEAD, INITIAL ENCOUNTER: ICD-10-CM

## 2024-11-27 DIAGNOSIS — S39.012A STRAIN OF LUMBAR REGION, INITIAL ENCOUNTER: ICD-10-CM

## 2024-11-27 PROCEDURE — 70450 CT HEAD/BRAIN W/O DYE: CPT | Performed by: EMERGENCY MEDICINE

## 2024-11-27 PROCEDURE — 99284 EMERGENCY DEPT VISIT MOD MDM: CPT

## 2024-11-27 RX ORDER — ACETAMINOPHEN 500 MG
1000 TABLET ORAL ONCE
Status: COMPLETED | OUTPATIENT
Start: 2024-11-27 | End: 2024-11-27

## 2024-11-27 RX ORDER — HYDROXYCHLOROQUINE SULFATE 200 MG/1
400 TABLET, FILM COATED ORAL DAILY
Qty: 180 TABLET | Refills: 2 | OUTPATIENT
Start: 2024-11-27
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
## 2024-12-09 ENCOUNTER — NURSE TRIAGE (OUTPATIENT)
Dept: INTERNAL MEDICINE CLINIC | Facility: CLINIC | Age: 54
End: 2024-12-09

## 2024-12-09 ENCOUNTER — OFFICE VISIT (OUTPATIENT)
Dept: INTERNAL MEDICINE CLINIC | Facility: CLINIC | Age: 54
End: 2024-12-09

## 2024-12-09 ENCOUNTER — TELEPHONE (OUTPATIENT)
Dept: RHEUMATOLOGY | Facility: CLINIC | Age: 54
End: 2024-12-09

## 2024-12-09 VITALS
BODY MASS INDEX: 50.02 KG/M2 | HEART RATE: 81 BPM | OXYGEN SATURATION: 97 % | WEIGHT: 293 LBS | SYSTOLIC BLOOD PRESSURE: 133 MMHG | HEIGHT: 64 IN | DIASTOLIC BLOOD PRESSURE: 77 MMHG

## 2024-12-09 DIAGNOSIS — M06.4 INFLAMMATORY POLYARTHRITIS (HCC): Primary | ICD-10-CM

## 2024-12-09 PROCEDURE — 99213 OFFICE O/P EST LOW 20 MIN: CPT | Performed by: INTERNAL MEDICINE

## 2024-12-09 PROCEDURE — 3008F BODY MASS INDEX DOCD: CPT | Performed by: INTERNAL MEDICINE

## 2024-12-09 PROCEDURE — 3078F DIAST BP <80 MM HG: CPT | Performed by: INTERNAL MEDICINE

## 2024-12-09 PROCEDURE — 3075F SYST BP GE 130 - 139MM HG: CPT | Performed by: INTERNAL MEDICINE

## 2024-12-09 RX ORDER — PREDNISONE 5 MG/1
TABLET ORAL
Qty: 60 TABLET | Refills: 0 | Status: SHIPPED | OUTPATIENT
Start: 2024-12-09 | End: 2025-01-20

## 2024-12-09 NOTE — TELEPHONE ENCOUNTER
Patient is requesting a medication refill and mentions she is having severe joint pain    Prednisone - the pack    Derrick in [facility] confirmed preferred

## 2024-12-09 NOTE — PROGRESS NOTES
Damari Waterman is a 54 year old female.   Chief Complaint   Patient presents with    Tingling     Feet and lower part of legs    Joint Pain     HPI:   She has a history of inflammatory polyarthritis and follows regularly with Rheumatology.  She was prescribed a brief course of prednisone for an acute flare in September.  For the past 4-5 days she has had recurrent diffuse joint pain affecting all of her joints.  She is hoping that prednisone can again be prescribed.  She called Rheumatology and is waiting for a call back.  She also has noticed numbness in both lower legs in the past few days.  She has chronic swelling in both lower legs which is unchanged.    Medications reviewed, as listed below.  She follows with a different PCP.    Current Outpatient Medications   Medication Sig Dispense Refill    COSENTYX UNOREADY 300 MG/2ML Subcutaneous Solution Auto-injector       Tirzepatide-Weight Management (ZEPBOUND) 2.5 MG/0.5ML Subcutaneous Solution Auto-injector Inject 2.5 mg into the skin once a week for 4 doses. 2 mL 0    FUROSEMIDE 20 MG Oral Tab TAKE 1 TO 2 TABLETS BY MOUTH DAILY AS NEEDED (Patient taking differently: Take 2 tablets (40 mg total) by mouth daily.) 90 tablet 0    clindamycin 1 % External Lotion Apply twice daily with flares 60 mL 11    Benzoyl Peroxide (BENZOYL PEROXIDE WASH) 10 % External Liquid Use daily as wash in shower to affected areas 227 g 3    PANTOPRAZOLE 40 MG Oral Tab EC TAKE 1 TABLET(40 MG) BY MOUTH TWICE DAILY BEFORE MEALS 180 tablet 0    fluticasone propionate 50 MCG/ACT Nasal Suspension 2 sprays by Nasal route daily. 48 g 0    estradiol 1 MG Oral Tab Take 1 tablet (1 mg total) by mouth daily. 90 tablet 1    hydroxychloroquine 200 MG Oral Tab Take 2 tablets (400 mg total) by mouth daily. 180 tablet 2    naproxen 500 MG Oral Tab Take 1 tablet (500 mg total) by mouth 2 (two) times daily as needed.      carvedilol 12.5 MG Oral Tab Take 1 tablet (12.5 mg total) by mouth 2 (two) times  daily with meals.      latanoprost 0.005 % Ophthalmic Solution Place 1 drop into the left eye nightly.      spironolactone 25 MG Oral Tab Take 1 tablet (25 mg total) by mouth daily.      Calcium Citrate-Vitamin D 200-6.25 MG-MCG Oral Tab Take 1 tablet by mouth daily.      ferrous sulfate 325 (65 FE) MG Oral Tab EC Take 1 tablet (325 mg total) by mouth daily with breakfast.      Multiple Vitamins-Minerals (MULTI-VITAMIN/MINERALS) Oral Tab Take 1 tablet by mouth daily.      acetaminophen 325 MG Oral Tab Take 1 tablet (325 mg total) by mouth every 6 (six) hours as needed for Pain.      PEG 3350-KCl-Na Bicarb-NaCl (TRILYTE) 420 g Oral Recon Soln Take prep as directed by gastro office. May substitute with Trilyte/generic equivalent if needed. 1 each 0    amLODIPine 5 MG Oral Tab       ergocalciferol 1.25 MG (50988 UT) Oral Cap Take 1 capsule (50,000 Units total) by mouth once a week.      losartan 100 MG Oral Tab Take 1 tablet (100 mg total) by mouth daily.      montelukast 10 MG Oral Tab Take 1 tablet (10 mg total) by mouth daily.      predniSONE 5 MG Oral Tab Take 1 tablet (5 mg total) by mouth daily. PRN (Patient not taking: Reported on 12/9/2024)       Allergies[1]   Past Medical History:    Acute bilateral low back pain    Anemia    Asthma (HCC)    Bronchitis    Essential hypertension    Glaucoma    High blood pressure    Hypokalemia    Interscapular pain    Last Assessment & Plan:    Formatting of this note might be different from the original.   Patient reports pain is a 2/10 today.      Lupus    Neck pain    Last Assessment & Plan:    Formatting of this note might be different from the original.   Patient reports pain is a 2/10 today.      Prediabetes    Right otitis media    Sleep apnea    Slow transit constipation    Type 2 diabetes mellitus without complication (HCC)     Past Surgical History:   Procedure Laterality Date    Lap gastric bypass/daniel-en-y      Total abdom hysterectomy        Social  History:  Social History     Socioeconomic History    Marital status: Single   Tobacco Use    Smoking status: Never     Passive exposure: Never    Smokeless tobacco: Never   Vaping Use    Vaping status: Never Used   Substance and Sexual Activity    Alcohol use: Never    Drug use: Never   Other Topics Concern    Reaction to local anesthetic No    Pt has a pacemaker No    Pt has a defibrillator No     Social Drivers of Health     Food Insecurity: Low Risk  (5/25/2023)    [facility]    Food Insecurity     Have there been times that your food ran out, and you didn't have money to get more?: No     Are there times that you worry that this might happen?: No   Transportation Needs: Low Risk  (5/25/2023)    [facility]    Transportation Needs     Do you have trouble getting transportation to medical appointments?: No        EXAM:   GENERAL: Pleasant female appearing well in no distress  /77 (BP Location: Left arm, Patient Position: Sitting, Cuff Size: adult)   Pulse 81   Ht 5' 4\" (1.626 m)   Wt 298 lb (135.2 kg)   SpO2 97%   BMI 51.15 kg/m²   LUNGS: Resonant to percussion and clear to auscultation  CARDIAC: Rhythm regular S1 S2 normal without murmur, with trace pitting edema distal lower extremities bilaterally  ABDOMEN: Obese.  Bowel sounds normal.  Soft and nontender  NEURO: Sensory testing with the 10 g monofilament diabetic sensory exam instrument is normal in both lower legs      ASSESSMENT AND PLAN:   1. Inflammatory polyarthritis (HCC)  Recommend she schedule appointment with Rheumatology.  Decision regarding corticosteroids per Rheumatology      The patient indicates understanding of these issues and agrees to the plan.  The patient is asked to return as needed.    Tal Nascimento MD  12/9/2024  4:02 PM         [1] No Known Allergies

## 2024-12-09 NOTE — TELEPHONE ENCOUNTER
Action Requested: Summary for Provider     []  Critical Lab, Recommendations Needed  [] Need Additional Advice  [x]   FYI    []   Need Orders  [] Need Medications Sent to Pharmacy  []  Other     SUMMARY: Per protocol, patient should be seen in the office today. Appointment scheduled.    Future Appointments   Date Time Provider Department Center   12/9/2024  4:00 PM Tal Nascimento MD [facility]     Reason for call: Appt Request and Swelling  Onset: 5 Days Ago    Patient reports of tingling in the feet, weakness in both legs, left more than the right, ongoing for 5 days now. Also complains of pain in both lower, swelling from ankle to the calf area. States she feels like she is retaining water. Denies chest pain, shortness of breath or difficulty of breathing. Reports she was diagnosed with restless leg syndrome 10 years ago and remains to experience the uncontrolled movements and pain in her legs at night. Requesting an appointment. Care advice given per protocol. Patient verbalized understanding and agreed with plan of care.     Reason for Disposition   Patient wants to be seen    Protocols used: Leg Swelling and Edema-A-OH

## 2024-12-09 NOTE — TELEPHONE ENCOUNTER
Attempted to contact patient. Left message to call back. Also sent MyChart message.    Appears may have seen PCP today as well.

## 2024-12-10 ENCOUNTER — PATIENT MESSAGE (OUTPATIENT)
Age: 54
End: 2024-12-10

## 2024-12-10 NOTE — TELEPHONE ENCOUNTER
Prednisone taper sent to patient's pharmacy in [place].  Perhaps, it would be a good idea for the patient to schedule follow-up with me at her convenience to further discuss.  Thank you.

## 2024-12-13 DIAGNOSIS — M25.472 LEFT ANKLE SWELLING: ICD-10-CM

## 2024-12-13 DIAGNOSIS — I10 BENIGN ESSENTIAL HYPERTENSION: ICD-10-CM

## 2024-12-13 NOTE — TELEPHONE ENCOUNTER
Please advise medication is patient external reported or historical.     Previously written by Yoanna Chin MD internal medicine    Patient established care on 04/15/2024 with Susan JACKSON     07/14/2024 office visit with Susan JACKSON   \"Took a long time to get amlodipine refilled and has been off it for about a month and noticed while being off it her left ankle has not been swollen anymore. She picked up the refill but wondering if she still needs to be on this as she is worried about her ankle swelling again. \"    \"2. Benign essential hypertension  Take 2.5mg (half a tablet) of amlodipine for the next 1-2 weeks. Please monitor blood pressure daily during this time and send a log of your readings to the office. \"    Office visit on 09/16/2024 with Susan JACKSON Notes Below  \"BP elevated in office today, does not have a machine at home so unable to monitor at home   Will refill furosemide and have her return in 1 week for a nurse visit to recheck her BP   If still elevated, will plan to go up on the carvedilol to 25mg BID\"    Requested Prescriptions     Pending Prescriptions Disp Refills    amLODIPine 5 MG Oral Tab  0

## 2024-12-14 RX ORDER — LATANOPROST 50 UG/ML
1 SOLUTION/ DROPS OPHTHALMIC NIGHTLY
Qty: 1 EACH | Refills: 3 | OUTPATIENT
Start: 2024-12-14

## 2024-12-14 NOTE — TELEPHONE ENCOUNTER
Rx listed as historical.   pls advise, thanks in advance.         Protocol Failed/ No Protocol    Requested Prescriptions   Pending Prescriptions Disp Refills    latanoprost 0.005 % Ophthalmic Solution  0     Sig: Place 1 drop into the left eye nightly.       There is no refill protocol information for this order          Future Appointments         Provider Department Appt Notes    In 5 days Mandy Wilcox MD [facility] 1M FU    In 1 week Isela Trimble DO [facility], [address], [place] FOLLOW  UP    In 3 weeks Oliver Su MD [facility] Osteoarthritis of both knees, unspecified osteoarthritis type    In 1 month PRIYA STEARNS [facility] Colon/EGD @ [facility]    In 1 month Edvin Pearson MD [facility]     In 1 month Ela Mccabe CNM [facility] - Midwifery f/u          Recent Outpatient Visits              5 days ago Inflammatory polyarthritis (HCC)    [facility] Tal Nascimento MD    Office Visit    3 weeks ago Hidradenitis suppurativa    [facility]    Nurse Only    3 weeks ago Bipolar II disorder (HCC)    [facility] Mandy Wilcox MD    Office Visit    1 month ago Hidradenitis    [facility] Edvin Pearson MD    Office Visit    2 months ago Discoid lupus    [facility] Isela Trimble DO    Office Visit

## 2024-12-15 RX ORDER — AMLODIPINE BESYLATE 5 MG/1
5 TABLET ORAL DAILY
Qty: 30 TABLET | Refills: 0 | Status: SHIPPED | OUTPATIENT
Start: 2024-12-15

## 2024-12-17 NOTE — TELEPHONE ENCOUNTER
Please review.  Refill?    Office visit 9/16/224:  Will refill furosemide and have her return in 1 week for a nurse visit to recheck her BP   If still elevated, will plan to go up on the carvedilol to 25mg BID        Requested Prescriptions   Pending Prescriptions Disp Refills    FUROSEMIDE 20 MG Oral Tab [Pharmacy Med Name: FUROSEMIDE 20MG TABLETS] 90 tablet 0     Sig: TAKE 1 TO 2 TABLETS BY MOUTH DAILY AS NEEDED       Hypertension Medications Protocol Passed - 12/17/2024  5:32 PM        Passed - CMP or BMP in past 12 months        Passed - Last BP reading less than 140/90     BP Readings from Last 1 Encounters:   12/09/24 133/77               Passed - In person appointment or virtual visit in the past 12 mos or appointment in next 3 mos     Recent Outpatient Visits              1 week ago Inflammatory polyarthritis (HCC)    [facility] Tal Nascimento MD    Office Visit    4 weeks ago Hidradenitis suppurativa    [facility]    Nurse Only    4 weeks ago Bipolar II disorder (HCC)    [facility] Mandy Wilcox MD    Office Visit    1 month ago Hidradenitis    [facility] Edvin Pearson MD    Office Visit    2 months ago Discoid lupus    [facility] Isela Trimble DO    Office Visit          Future Appointments         Provider Department Appt Notes    In 2 days Mandy Wilcox MD [facility] 1M FU    In 2 days Susan Darnell APRN [facility] Annual Physical ~ Handicap Placard    In 1 week Isela Trimble DO [facility] [address], [place] FOLLOW  UP    In 3 weeks Oliver Su MD [facility] Osteoarthritis of both knees, unspecified  osteoarthritis type    In 4 weeks STEARNS, PROCEDURE [facility] [place] Colon/EGD @ [facility]    In 1 month Edvin Pearson MD [facility] [place]     In 1 month Ela Mccabe CNM [facility] [place] - Midwifery f/u                    Passed - EGFRCR or GFRAA > 50     GFR Evaluation  EGFRCR: 69 , resulted on 8/13/2024             Future Appointments         Provider Department Appt Notes    In 2 days Mandy Wilcox MD [facility] [place] 1M FU    In 2 days Susan Darnell APRN [facility] [place] Annual Physical ~ Handicap Placard    In 1 week Isela Trimble DO [facility] [address], [place] FOLLOW  UP    In 3 weeks Oliver Su MD [facility] [place] Osteoarthritis of both knees, unspecified osteoarthritis type    In 4 weeks STEARNS, PROCEDURE [facility] [place] Colon/EGD @ [facility]    In 1 month Edvin Pearson MD [facility] [place]     In 1 month Ela Mcacbe CNM [facility] [place] - Midwifery f/u          Recent Outpatient Visits              1 week ago Inflammatory polyarthritis (HCC)    [facility] [place] Tal Nascimento MD    Office Visit    4 weeks ago Hidradenitis suppurativa    [facility] [place]    Nurse Only    4 weeks ago Bipolar II disorder (HCC)    [facility] [place] Mandy Wilcox MD    Office Visit    1 month ago Hidradenitis    [facility] Edvin Pearson MD    Office Visit    2 months ago Discoid lupus    [facility] [place] Isela Trimble DO    Office Visit

## 2024-12-18 DIAGNOSIS — M25.472 LEFT ANKLE SWELLING: ICD-10-CM

## 2024-12-18 DIAGNOSIS — I10 BENIGN ESSENTIAL HYPERTENSION: ICD-10-CM

## 2024-12-18 RX ORDER — FUROSEMIDE 20 MG/1
TABLET ORAL
Qty: 90 TABLET | Refills: 0 | Status: SHIPPED | OUTPATIENT
Start: 2024-12-18

## 2024-12-19 RX ORDER — AMLODIPINE BESYLATE 5 MG/1
5 TABLET ORAL DAILY
Qty: 90 TABLET | Refills: 3 | Status: SHIPPED | OUTPATIENT
Start: 2024-12-19

## 2024-12-19 NOTE — TELEPHONE ENCOUNTER
Refill passed per [facility] protocol.   Requested Prescriptions   Pending Prescriptions Disp Refills    AMLODIPINE 5 MG Oral Tab [Pharmacy Med Name: AMLODIPINE BESYLATE 5MG TABLETS] 90 tablet 0     Sig: TAKE 1 TABLET(5 MG) BY MOUTH DAILY       Hypertension Medications Protocol Passed - 12/19/2024  9:49 AM        Passed - CMP or BMP in past 12 months        Passed - Last BP reading less than 140/90     BP Readings from Last 1 Encounters:   12/09/24 133/77               Passed - In person appointment or virtual visit in the past 12 mos or appointment in next 3 mos     Recent Outpatient Visits              Today     [facility] Mandy Wilcox MD    Office Visit    1 week ago Inflammatory polyarthritis (HCC)    [facility] Tal Nascimento MD    Office Visit    1 month ago Hidradenitis suppurativa    [facility]    Nurse Only    1 month ago Bipolar II disorder (HCC)    [facility] Mandy Wilcox MD    Office Visit    1 month ago Hidradenitis    [facility] Edvin Pearson MD    Office Visit          Future Appointments         Provider Department Appt Notes    In 5 days Isela Trimble DO [facility] [address], [place] FOLLOW  UP    In 1 week Susan Darnell APRN [facility] PX last unknown ~ Handicap Placard    In 2 weeks Oliver Su MD [facility] Osteoarthritis of both knees, unspecified osteoarthritis type    In 3 weeks PRIYA STEARNS [facility] Colon/EGD @ [facility]    In 1 month Edvin Pearson MD [facility]     In 1 month Ela Mccabe CNM [facility]  [place] - Midwifery f/u                    Passed - EGFRCR or GFRAA > 50     GFR Evaluation  EGFRCR: 69 , resulted on 8/13/2024              Recent Outpatient Visits              Today     [facility] [place] Mandy Wilcox MD    Office Visit    1 week ago Inflammatory polyarthritis (HCC)    [facility] Tal Nascimento MD    Office Visit    1 month ago Hidradenitis suppurativa    [facility]    Nurse Only    1 month ago Bipolar II disorder (HCC)    [facility] [place] Mandy Wilcox MD    Office Visit    1 month ago Hidradenitis    [facility] Edvin Pearson MD    Office Visit           Future Appointments         Provider Department Appt Notes    In 5 days Isela Trimble DO [facility] [address], [place] FOLLOW  UP    In 1 week Susan Darnell APRN [facility] PX last unknown ~ Handicap Placard    In 2 weeks Oliver Su MD [facility] Osteoarthritis of both knees, unspecified osteoarthritis type    In 3 weeks PRIYA STEARNS [facility] [place] Colon/EGD @ [facility]    In 1 month [facility]     In 1 month Ela Mccabe CNM [facility] [place] - Midwifery f/u

## 2024-12-23 RX ORDER — FUROSEMIDE 20 MG/1
TABLET ORAL
Qty: 180 TABLET | Refills: 0 | OUTPATIENT
Start: 2024-12-23

## 2024-12-24 ENCOUNTER — OFFICE VISIT (OUTPATIENT)
Dept: RHEUMATOLOGY | Facility: CLINIC | Age: 54
End: 2024-12-24

## 2024-12-24 DIAGNOSIS — R21 RASH: ICD-10-CM

## 2024-12-24 DIAGNOSIS — L73.2 HIDRADENITIS SUPPURATIVA: ICD-10-CM

## 2024-12-24 DIAGNOSIS — L93.0 DISCOID LUPUS: Primary | ICD-10-CM

## 2024-12-24 DIAGNOSIS — M06.4 INFLAMMATORY POLYARTHRITIS (HCC): ICD-10-CM

## 2024-12-24 PROCEDURE — 99215 OFFICE O/P EST HI 40 MIN: CPT | Performed by: INTERNAL MEDICINE

## 2024-12-24 PROCEDURE — G2211 COMPLEX E/M VISIT ADD ON: HCPCS | Performed by: INTERNAL MEDICINE

## 2024-12-24 RX ORDER — PREDNISONE 5 MG/1
TABLET ORAL
Qty: 60 TABLET | Refills: 0 | Status: SHIPPED | OUTPATIENT
Start: 2024-12-24 | End: 2025-02-04

## 2024-12-24 NOTE — PROGRESS NOTES
RHEUMATOLOGY CLINIC- FOLLOW UP    Damari Waterman is a 54 year old female.    ASSESSMENT/PLAN:       ICD-10-CM    1. 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 Inflammatory polyarthritis (HCC)  M06.4       3. Rash  R21       4. Hidradenitis suppurativa  L73.2         DISCUSSION:  Patient presents for follow-up of inflammatory polyarthritis in the setting of history of discoid lupus, formally following with [facility] rheumatology for discoid lupus (per patient, biopsy-proven since childhood).  Subsequent lab workup notable for only low positive SHARAD with negative dsDNA and normal complements.  Normal RF/CCP.    Patient's symptoms are corticosteroid responsive, however, she is concerned about the subsequent swelling and would like to limit prednisone use.  She does have synovitis on current exam consistent with an underlying inflammatory arthropathy with suspicions including: Seronegative RA versus SLE associated inflammatory arthritis versus hidradenitis associated inflammatory arthropathy.  Treatment is complicated by her comorbidities but agree with dermatology regarding Cosentyx and discussed with patient that this would hopefully treat both her hidradenitis suppurativa as well as her inflammatory arthritis.  Would avoid TNF inhibitors going forward given discoid lupus.  Lastly, continues on hydroxychloroquine and reports recently normal eye exam.  Will reach out to ophthalmology.    PLAN:  - S/P Cosentyx loading doses. First Cosentyx qm dose Jan 2025 per Dermatology for severe HS.  -Continue hydroxychloroquine 400 mg daily (less than 5 mg/kg).  Discussed with patient can divide up doses and to 200 mg twice daily if GI upset.       -Previously discussed with patient side effects of Plaquenil which include but are not limited to retinopathy skin changes, blood abnormalities, hepatotoxicity, cardiotoxicity, neuro changes such as dizziness, fatigue, irritability, myopathy.         -Notes recent HCQ-Eye Exam with Dr. Parada  Quintin. Will reach out.   -P.o. prednisone taper x 12 days.  Afterwards, patient can continue 5 mg daily with breakfast as needed for breakthrough pain.  - Consult/evaluation communicated with referring physician/provider.       -Derm: Dr. Pearson       -Opthm: Dr. Tal Ribeiro     [facility]   12/24/2024   10:51 AM      There is a longitudinal care relationship with me, the care plan reflects the ongoing nature of the continuous relationship of care, and the medical record indicates that there is ongoing treatment of a serious/complex medical condition which I am currently managing.  is Applicable.       Discussed with patient that they are on chronic treatment with a high-risk medication that requires close lab monitoring for possible drug toxicity.  Additionally, discussed with patient give the possible immunosuppressive effects of their medication as well as their underlying hyper-inflammatory state, the importance of keeping vaccinations and age-appropriate screenings up-to-date, given increased risk of complications and malignancies seen in these patient populations.  Patient to f/u with repeat labs drawn prior (standing labs ordered).  Last QuantiFERON TB testing: 10/24/24  Last Hepatitis testing: 10/24/24    SUBJECTIVE:     12/24/24 Follow-Up:   Of note, patient recently seen by dermatology, Dr. Pearson, on 10/24/2024.  Given the extent of her eye hidradenitis suppurativa, in addition to topicals, also discussed starting Cosentyx for severe hidradenitis suppurativa and PA started.    Patient started her once weekly loading doses of Cosentyx x 4 total doses in November with plans for her first monthly dose of Cosentyx starting January 2025 with dermatology.  Notes flare of arthralgias (especially of her shoulders and knees) starting early December, responsive to steroids but she is worried about the swelling she has been experiencing while on prednisone and  would like to limit her prednisone dosing.  Additionally, continues on hydroxychloroquine with reportedly recent normal eye exam about 2 weeks ago, Dr. Tal Ribeiro.       Current Medications:  Hydroxychloroquine 400 mg daily-resumed 5/21/2024  Cosentyx qm (monthly doses starting Jan 2025)- with Derm for HS      -s/p once weekly loading doses November    Medication History:  Naproxen 500 mg twice daily    Dapsone-ineffective  Pimecrolimus  PO Methotrexate (can't remember side effects) > Subcutaneous methotrexate 20 mg once weekly-insurance issues per notes. Patient noting skin burning   Leflunomide 20 mg daily-polyarthralgias, uncontrolled skin disease    Interval History:     5/21/2024 initial consult: I had the pleasure of seeing Damari Waterman on 5/21/2024 as a new outpatient consultation for Discoid Lupus. The patient was originally referred by her PCP, Dr. Flor Jenkins.     54 year old female w/ PMH Discoid SLE, Glaucoma, HTN, asthma, RLS, GERD, ABAD, pre-diabetes who presents to clinic today.Note, patient formally following with [facility] rheumatology, Dr. Connor, with last appointment 9/20/2023.  During that appointment, patient presenting with history of discoid lupus since she was 10 years old, on hydroxychloroquine 200 mg twice daily for 5 years.  However, noting progression in her skin lesions including: Nose, face.  Using topical tacrolimus as needed per her dermatologist.  No other history of other organ involvement.  Was taking leflunomide at the time with progressive arthralgias and new skin lesions.  Suspected flare of symptoms as being off dapsone versus viral syndrome versus due to her multiple boils.  Recommended dapsone 100 mg daily and a Medrol dose taper.  Was recommended to continue leflunomide.  Possible swab to one of the lesions to rule out MRSA.    Patient presents as a new consult today to establish care.  States she stopped hydroxychloroquine about a year ago and has tried  multiple different DMARDs but is unsure regarding whether she experienced side effects and why medications were stopped/modified.  Had a reportedly normal eye exam in January.  Was prescribed a prednisone taper x 5 days with improvement of her polyarthralgias.  Notes morning stiffness as well as associated joint pain with swelling.  ROS also positive for frequent chills, hair loss, episodic ulcers in her nose/mouth, as well as burning/itching sensation in the sun.  Also notes persistence of the skin lesions which can affect her armpits, under her breasts, groin.  No measured fevers, history of uveitis/iritis, pleurisy, Raynaud's phenomenon.  Notes sicca symptoms.  Family history notable for father with RA.      9/26/2024 follow-up:  Patient notes significant improvement in her polyarthralgia since starting hydroxychloroquine.  However, feels that her boils are getting worse.  Notes that these boils seem different than her previous discoid lupus lesions.    HISTORY:  Past Medical History:    Acute bilateral low back pain    Anemia    Asthma (HCC)    Bronchitis    Essential hypertension    Glaucoma    High blood pressure    Hypokalemia    Interscapular pain    Last Assessment & Plan:    Formatting of this note might be different from the original.   Patient reports pain is a 2/10 today.      Lupus    Neck pain    Last Assessment & Plan:    Formatting of this note might be different from the original.   Patient reports pain is a 2/10 today.      Prediabetes    Right otitis media    Sleep apnea    Slow transit constipation    Type 2 diabetes mellitus without complication (HCC)      Social Hx Reviewed   Family Hx Reviewed     Medications (Active prior to today's visit):  Current Outpatient Medications   Medication Sig Dispense Refill    amLODIPine 5 MG Oral Tab Take 1 tablet (5 mg total) by mouth daily. 90 tablet 3    CUSTOM MEDICATION Semaglutide/cyanocobalamin injection    Concentration: 5 mg/ 0.5 mL   Amount: 2.5 mL  vial x2 (total of 5 mL) with supplies  Instructions: Inject 0.1 mL weekly subcutaneous 1 each 0    furosemide 20 MG Oral Tab Take 1-2 tablets (20-40 mg total) by mouth daily as needed. 90 tablet 0    CUSTOM MEDICATION Semaglutide/cyanocobalamin injection    Concentration: 1 mg/ 0.5 mL   Amount: 5 ML vial with supplies  Instructions: Inject 0.25 mL weekly 1 each 0    predniSONE 5 MG Oral Tab Take 4 tablets (20 mg total) by mouth daily for 3 days, THEN 3 tablets (15 mg total) daily for 3 days, THEN 2 tablets (10 mg total) daily for 3 days, THEN 1 tablet (5 mg total) daily for 3 days, THEN 1 tablet (5 mg total) daily as needed. Take as instructed in the morning with breakfast for 12 days.  Then, after 12 days, only take prednisone up to 1 tablet daily as needed for breakthrough pain.. 60 tablet 0    COSENTYX UNOREADY 300 MG/2ML Subcutaneous Solution Auto-injector       predniSONE 5 MG Oral Tab Take 1 tablet (5 mg total) by mouth daily. PRN (Patient not taking: Reported on 12/19/2024)      clindamycin 1 % External Lotion Apply twice daily with flares 60 mL 11    Benzoyl Peroxide (BENZOYL PEROXIDE WASH) 10 % External Liquid Use daily as wash in shower to affected areas 227 g 3    PANTOPRAZOLE 40 MG Oral Tab EC TAKE 1 TABLET(40 MG) BY MOUTH TWICE DAILY BEFORE MEALS 180 tablet 0    fluticasone propionate 50 MCG/ACT Nasal Suspension 2 sprays by Nasal route daily. 48 g 0    estradiol 1 MG Oral Tab Take 1 tablet (1 mg total) by mouth daily. 90 tablet 1    hydroxychloroquine 200 MG Oral Tab Take 2 tablets (400 mg total) by mouth daily. 180 tablet 2    naproxen 500 MG Oral Tab Take 1 tablet (500 mg total) by mouth 2 (two) times daily as needed.      carvedilol 12.5 MG Oral Tab Take 1 tablet (12.5 mg total) by mouth 2 (two) times daily with meals.      latanoprost 0.005 % Ophthalmic Solution Place 1 drop into the left eye nightly.      spironolactone 25 MG Oral Tab Take 1 tablet (25 mg total) by mouth daily.      Calcium  Citrate-Vitamin D 200-6.25 MG-MCG Oral Tab Take 1 tablet by mouth daily.      ferrous sulfate 325 (65 FE) MG Oral Tab EC Take 1 tablet (325 mg total) by mouth daily with breakfast.      Multiple Vitamins-Minerals (MULTI-VITAMIN/MINERALS) Oral Tab Take 1 tablet by mouth daily.      acetaminophen 325 MG Oral Tab Take 1 tablet (325 mg total) by mouth every 6 (six) hours as needed for Pain.      PEG 3350-KCl-Na Bicarb-NaCl (TRILYTE) 420 g Oral Recon Soln Take prep as directed by gastro office. May substitute with Trilyte/generic equivalent if needed. 1 each 0    ergocalciferol 1.25 MG (55925 UT) Oral Cap Take 1 capsule (50,000 Units total) by mouth once a week.      losartan 100 MG Oral Tab Take 1 tablet (100 mg total) by mouth daily.      montelukast 10 MG Oral Tab Take 1 tablet (10 mg total) by mouth daily.         Allergies:  No Known Allergies      ROS:   Review of Systems   Constitutional:  Negative for chills and fever.   HENT:  Negative for congestion, hearing loss, mouth sores, nosebleeds and trouble swallowing.    Eyes:  Negative for photophobia, pain, redness and visual disturbance.   Respiratory:  Negative for cough and shortness of breath.    Cardiovascular:  Negative for chest pain, palpitations and leg swelling.   Gastrointestinal:  Negative for abdominal pain, blood in stool, diarrhea and nausea.   Endocrine: Negative for cold intolerance and heat intolerance.   Genitourinary:  Negative for dysuria, frequency and hematuria.   Musculoskeletal:  Positive for arthralgias, gait problem and joint swelling. Negative for back pain, myalgias, neck pain and neck stiffness.   Skin:  Positive for wound. Negative for color change and rash.   Neurological:  Negative for dizziness, weakness, numbness and headaches.   Psychiatric/Behavioral:  Negative for confusion and dysphoric mood.         PHYSICAL EXAM:     Constitutional:  Well developed, Well nourished, No acute distress  HENT:  Normocephalic, Atraumatic,  Bilateral external ears normal, Oropharynx moist, No oral exudates.  Neck: Normal range of motion, No tenderness, Supple, No stridor.  Eyes:  PERRL, EOMI, Conjunctiva normal, No discharge.  Respiratory:  Normal breath sounds, No respiratory distress, No wheezing.  Cardiovascular:  Normal heart rate, Normal rhythm, No murmurs, No rubs, No gallops.  GI:  Bowel sounds normal, Soft, No tenderness, No masses, No pulsatile masses.  : No CVA tenderness.  Musculoskeletal:  A comprehensive 28 count joint exam was done and was negative for swelling or tenderness except as noted. Inspections for misalignment, asymmetry, crepitation, defects, tenderness, masses, nodules, effusions, range of motion, and stability in the upper and lower extremities bilaterally are all normal unless noted.           Integument:  Warm, Dry, No erythema  Lymphatic:  No lymphadenopathy noted.  Neurologic:  Alert & oriented x 3, Normal motor function, Normal sensory function, No focal deficits noted.  Psychiatric:  Affect normal, Judgment normal, Mood normal.    LABS:     Lab work reviewed and notable for:    10/24/2024:    Acute hepatitis panel nonreactive, QuantiFERON TB test negative    8/13/2024:  BMP with BUN 22, CR 0.96,  CBC with WBC 3.7 (low), Hg, PLT WNL    8/13/2024:  BMP with BUN 22, CR 0.96  CBC with WBC 3.7 (borderline low), normal Hg/PLT    5/21/2024:  SHARAD 1: 80 homogenous with negative reflex antibodies, dsDNA by IFA less than 10 WNL  C3 111.9 WNL, C4 27.8 WNL  ESR 41 (high), CRP less than 0.4 WNL  RF less than 10 WNL, CCP 2.2 WNL    Quant TB and acute hepatitis panel negative    3/17/2024:  CMP with normal renal function, borderline alk phos 109 other LFTs WNL, no gamma gap noted  CBC without cytopenias or leukocytosis    2/14/2024:  dsDNA negative by crithidia IFA  C3 138 WNL, C4 40 WNL  CRP less than 1, ESR 43 (high)    11/10/2023:  UA negative blood/10 protein    3/21/2023:  G6PD 18 WNL  Imaging:     N/A

## 2024-12-24 NOTE — TELEPHONE ENCOUNTER
Spoke with patient, FIDE verified, patient stated losartan 100 mg was rx'd by her previous PCP.  She is now requesting med refill from [facility] as she is out of meds for 2 days now.   pls advise, thanks in advance.

## 2024-12-24 NOTE — TELEPHONE ENCOUNTER
Refill Passed Per Protocol    Requested Prescriptions   Pending Prescriptions Disp Refills    losartan 100 MG Oral Tab 90 tablet 3     Sig: Take 1 tablet (100 mg total) by mouth daily.       Hypertension Medications Protocol Passed - 12/24/2024 11:42 AM        Passed - CMP or BMP in past 12 months        Passed - Last BP reading less than 140/90     BP Readings from Last 1 Encounters:   12/19/24 138/84               Passed - In person appointment or virtual visit in the past 12 mos or appointment in next 3 mos     Recent Outpatient Visits              Today Discoid lupus    [facility], [place], Ramier, DO    Office Visit    5 days ago Bipolar II disorder ([facility])    [facility] Mandy Wilcox MD    Office Visit    2 weeks ago Inflammatory polyarthritis ([facility])    [facility] [place] Tal Nascimento MD    Office Visit    1 month ago Hidradenitis suppurativa    [facility] ([place], [place])    Nurse Only    1 month ago Bipolar II disorder ([facility])    [facility] Mandy Wilcox MD    Office Visit          Future Appointments         Provider Department Appt Notes    In 6 days Susan Darnell APRN [facility] ([place], [place]) PX last unknown ~ Handicap Placard    In 2 weeks Oliver Su MD [facility] Osteoarthritis of both knees, unspecified osteoarthritis type    In 3 weeks PRIYA STEARNS [facility] Colon/EGD @ [facility]    In 1 month Edvin Pearson MD [facility]     In 1 month Nataly Sena RD [facility], [place]     In 1 month Ela Mccabe CNM [facility] - Midwifery f/u    In 2 months Jeri  MD [facility], [place], [place]     In 3 months Isela Trimble DO [facility], [place] 3 month f/u                    Passed - EGFRCR or GFRAA > 50     GFR Evaluation  EGFRCR: 69 , resulted on 8/13/2024               Future Appointments         Provider Department Appt Notes    In 6 days Susan Darnell APRN [facility] PX last unknown ~ Handicap Placard    In 2 weeks Oliver Su MD [facility], [place] Osteoarthritis of both knees, unspecified osteoarthritis type    In 3 weeks PRIYA STEARNS [facility] [place] Colon/EGD @ [facility]    In 1 month Edvin Pearson MD [facility]     In 1 month Nataly Sena RD [facility], [place]     In 1 month Ela Mccabe [facility] - Midwifery f/u    In 2 months [facility]     In 3 months Isela Trimble DO [facility], [place] 3 month f/u          Recent Outpatient Visits              Today Discoid lupus    [facility] [address], [place] Isela Trimble DO    Office Visit    5 days ago Bipolar II disorder (HCC)    [facility] Mandy Wilcox MD    Office Visit    2 weeks ago Inflammatory polyarthritis ([facility])    [facility] Tal Nascimento MD    Office Visit    1 month ago Hidradenitis suppurativa    [facility]    Nurse Only    1 month ago Bipolar II disorder (HCC)    [facility] Mandy Wilcox MD    Office Visit

## 2024-12-26 RX ORDER — LOSARTAN POTASSIUM 100 MG/1
100 TABLET ORAL DAILY
Qty: 90 TABLET | Refills: 3 | Status: SHIPPED | OUTPATIENT
Start: 2024-12-26 | End: 2024-12-30

## 2024-12-30 ENCOUNTER — OFFICE VISIT (OUTPATIENT)
Dept: INTERNAL MEDICINE CLINIC | Facility: CLINIC | Age: 54
End: 2024-12-30

## 2024-12-30 ENCOUNTER — TELEPHONE (OUTPATIENT)
Dept: INTERNAL MEDICINE CLINIC | Facility: CLINIC | Age: 54
End: 2024-12-30

## 2024-12-30 VITALS
HEART RATE: 77 BPM | DIASTOLIC BLOOD PRESSURE: 82 MMHG | HEIGHT: 61.8 IN | BODY MASS INDEX: 55 KG/M2 | SYSTOLIC BLOOD PRESSURE: 148 MMHG | OXYGEN SATURATION: 97 %

## 2024-12-30 DIAGNOSIS — Z00.00 ROUTINE GENERAL MEDICAL EXAMINATION AT A HEALTH CARE FACILITY: Primary | ICD-10-CM

## 2024-12-30 DIAGNOSIS — M79.89 LEFT LEG SWELLING: ICD-10-CM

## 2024-12-30 DIAGNOSIS — I10 BENIGN ESSENTIAL HYPERTENSION: ICD-10-CM

## 2024-12-30 DIAGNOSIS — Z23 NEED FOR VACCINATION: ICD-10-CM

## 2024-12-30 NOTE — PATIENT INSTRUCTIONS
losartan and please monitor BP at home and send a message with a few readings over the next few weeks  When you find your pneumonia vaccine records please send the office the dates and specific vaccine that you received  Thank you

## 2024-12-30 NOTE — PROGRESS NOTES
Subjective:   Damari Waterman is a 54 year old female who presents for Physical     Presents for annual physical   Was having a lot of arthritis flare up recently, was given another round of prednisone until the cosentyx kicks in   Feeling better now     PMHx  Bipolar II   Morbid obesity   Inflammatory polyarthritis   HTN  Asthma  Prediabetes   RLS  TOD   GERD  Uterine leiomyoma  Glaucoma left eye  Discoid lupus  HS  ABAD     Will get her flu shot today     Was taking furosemide daily but for the last week she just took it one day over the past week and only took 20mg    No chest pain, shortness of breath, dizziness     Has been out of losartan for about a week   BP usually runs 120-140s      Noticed some shortness of breath on Saturday when she was sitting still and one other day that week   Lasted 5-10 minutes    No associated chest pain   Not currently exercising  Couldn't walk with the swelling in her joints     Did an intake with [facility] and was given a number to call, she called and left her information   Doing better, no panic attacks    Does not like to add any other medications     Has had two pneumonia vaccines    Colonoscopy scheduled 1/15     No smoking, drugs or alcohol     Sometimes the whole leg will go numb  Has pain starting at her low back and radiating down the leg     History/Other:    Chief Complaint Reviewed and Verified  No Further Nursing Notes to   Review  Tobacco Reviewed  Allergies Reviewed  Medications Reviewed    Problem List Reviewed  Medical History Reviewed  Surgical History   Reviewed  OB Status Reviewed  Family History Reviewed         Tobacco:  She has never smoked tobacco.    Current Outpatient Medications   Medication Sig Dispense Refill    Blood Pressure Monitor Does not apply Device Please monitor blood pressure once daily 1 each 0    predniSONE 5 MG Oral Tab Take 4 tablets (20 mg total) by mouth daily for 3 days, THEN 3 tablets (15 mg total) daily for 3 days, THEN 2  tablets (10 mg total) daily for 3 days, THEN 1 tablet (5 mg total) daily for 3 days, THEN 1 tablet (5 mg total) daily as needed. Take as instructed in the morning with breakfast for 12 days.  Then, after 12 days, only take prednisone up to 1 tablet daily as needed for breakthrough pain.. 60 tablet 0    amLODIPine 5 MG Oral Tab Take 1 tablet (5 mg total) by mouth daily. 90 tablet 3    CUSTOM MEDICATION Semaglutide/cyanocobalamin injection    Concentration: 5 mg/ 0.5 mL   Amount: 2.5 mL vial x2 (total of 5 mL) with supplies  Instructions: Inject 0.1 mL weekly subcutaneous 1 each 0    furosemide 20 MG Oral Tab Take 1-2 tablets (20-40 mg total) by mouth daily as needed. 90 tablet 0    CUSTOM MEDICATION Semaglutide/cyanocobalamin injection    Concentration: 1 mg/ 0.5 mL   Amount: 5 ML vial with supplies  Instructions: Inject 0.25 mL weekly 1 each 0    COSENTYX UNOREADY 300 MG/2ML Subcutaneous Solution Auto-injector       clindamycin 1 % External Lotion Apply twice daily with flares 60 mL 11    Benzoyl Peroxide (BENZOYL PEROXIDE WASH) 10 % External Liquid Use daily as wash in shower to affected areas 227 g 3    PANTOPRAZOLE 40 MG Oral Tab EC TAKE 1 TABLET(40 MG) BY MOUTH TWICE DAILY BEFORE MEALS 180 tablet 0    fluticasone propionate 50 MCG/ACT Nasal Suspension 2 sprays by Nasal route daily. 48 g 0    estradiol 1 MG Oral Tab Take 1 tablet (1 mg total) by mouth daily. 90 tablet 1    hydroxychloroquine 200 MG Oral Tab Take 2 tablets (400 mg total) by mouth daily. 180 tablet 2    naproxen 500 MG Oral Tab Take 1 tablet (500 mg total) by mouth 2 (two) times daily as needed.      carvedilol 12.5 MG Oral Tab Take 1 tablet (12.5 mg total) by mouth 2 (two) times daily with meals.      latanoprost 0.005 % Ophthalmic Solution Place 1 drop into the left eye nightly.      spironolactone 25 MG Oral Tab Take 1 tablet (25 mg total) by mouth daily.      Calcium Citrate-Vitamin D 200-6.25 MG-MCG Oral Tab Take 1 tablet by mouth daily.       ferrous sulfate 325 (65 FE) MG Oral Tab EC Take 1 tablet (325 mg total) by mouth daily with breakfast.      Multiple Vitamins-Minerals (MULTI-VITAMIN/MINERALS) Oral Tab Take 1 tablet by mouth daily.      acetaminophen 325 MG Oral Tab Take 1 tablet (325 mg total) by mouth every 6 (six) hours as needed for Pain.      PEG 3350-KCl-Na Bicarb-NaCl (TRILYTE) 420 g Oral Recon Soln Take prep as directed by gastro office. May substitute with Trilyte/generic equivalent if needed. 1 each 0    ergocalciferol 1.25 MG (90541 UT) Oral Cap Take 1 capsule (50,000 Units total) by mouth once a week.      montelukast 10 MG Oral Tab Take 1 tablet (10 mg total) by mouth daily.      losartan 100 MG Oral Tab Take 1 tablet (100 mg total) by mouth daily. (Patient not taking: Reported on 12/30/2024) 90 tablet 3       Review of Systems:  Review of Systems  10 point review of systems otherwise negative with the exception of HPI and assessment and plan    Objective:   /82   Pulse 77   Ht 5' 1.8\" (1.57 m)   SpO2 97%   BMI 55.04 kg/m²  Estimated body mass index is 55.04 kg/m² as calculated from the following:    Height as of this encounter: 5' 1.8\" (1.57 m).    Weight as of 12/19/24: 299 lb (135.6 kg).  Physical Exam  Vitals reviewed.   Constitutional:       General: She is not in acute distress.     Appearance: Normal appearance. She is well-developed.   HENT:      Right Ear: Tympanic membrane normal.      Left Ear: Tympanic membrane normal.      Mouth/Throat:      Mouth: Mucous membranes are moist.      Pharynx: Oropharynx is clear.   Cardiovascular:      Rate and Rhythm: Normal rate and regular rhythm.      Heart sounds: Normal heart sounds.   Pulmonary:      Effort: Pulmonary effort is normal.      Breath sounds: Normal breath sounds.   Abdominal:      General: Bowel sounds are normal.      Palpations: Abdomen is soft.   Lymphadenopathy:      Cervical: No cervical adenopathy.   Skin:     General: Skin is warm and dry.   Neurological:       Mental Status: She is alert and oriented to person, place, and time.           Assessment & Plan:   1. Routine general medical examination at a health care facility (Primary)  -     Lipid Panel; Future; Expected date: 12/30/2024  -     TSH W Reflex To Free T4; Future; Expected date: 12/30/2024  -     CBC, Platelet; No Differential; Future; Expected date: 12/30/2024  2. Need for vaccination  -     Fluzone trivalent vaccine, PF 0.5mL, 6mo+ (45230)  3. Left leg swelling  -     Vascular Surgery - In Network  4. Benign essential hypertension  -     Blood Pressure Monitor; Please monitor blood pressure once daily  Dispense: 1 each; Refill: 0     losartan and please monitor BP at home and send a message with a few readings over the next few weeks    MANUEL Warren, 12/30/2024, 1:31 PM

## 2025-01-07 NOTE — TELEPHONE ENCOUNTER
Refill Request for medication(s):     Last Office Visit: 10/24/24    Last Refill:     Pharmacy, Dosage verified:     Condition Update (if applicable):     Rx pended and sent to provider for approval, please advise. Thank You!

## 2025-01-09 ENCOUNTER — PATIENT MESSAGE (OUTPATIENT)
Dept: INTERNAL MEDICINE CLINIC | Facility: CLINIC | Age: 55
End: 2025-01-09

## 2025-01-09 DIAGNOSIS — M25.512 BILATERAL SHOULDER PAIN, UNSPECIFIED CHRONICITY: ICD-10-CM

## 2025-01-09 DIAGNOSIS — M17.0 OSTEOARTHRITIS OF BOTH KNEES, UNSPECIFIED OSTEOARTHRITIS TYPE: Primary | ICD-10-CM

## 2025-01-09 DIAGNOSIS — M25.511 BILATERAL SHOULDER PAIN, UNSPECIFIED CHRONICITY: ICD-10-CM

## 2025-01-09 RX ORDER — BENZOYL PEROXIDE 10 G/100G
SUSPENSION TOPICAL
Qty: 227 G | Refills: 3 | OUTPATIENT
Start: 2025-01-09

## 2025-01-09 RX ORDER — SENNOSIDES 8.6 MG
650 CAPSULE ORAL EVERY 8 HOURS PRN
COMMUNITY

## 2025-01-10 NOTE — TELEPHONE ENCOUNTER
Patient advised additional information needed for CPAP supply order as no sleep study results or vendor available. Also advised upcoming visit with Dr Suazo is authorized as referral had already been placed.

## 2025-01-14 ENCOUNTER — TELEPHONE (OUTPATIENT)
Dept: INTERNAL MEDICINE CLINIC | Facility: CLINIC | Age: 55
End: 2025-01-14

## 2025-01-14 ENCOUNTER — TELEPHONE (OUTPATIENT)
Facility: CLINIC | Age: 55
End: 2025-01-14

## 2025-01-14 DIAGNOSIS — R09.81 NASAL CONGESTION: ICD-10-CM

## 2025-01-14 NOTE — TELEPHONE ENCOUNTER
Patient is requesting to reschedule 1/15/25 Colonoscopy and Esophagogastroduodenoscopy.   She states that she has a Lupos flare up and is in a lot of pain.  Please call

## 2025-01-14 NOTE — TELEPHONE ENCOUNTER
Advised next available for Dr Kan is may/June. Patient states she is also having a transportation issue. Is concerned that when she rescheduled this may occur again. Will continue to work on transportation. If she can secure a ride, she will proceed with Cololonoscopy/EGD. Patient will call first thing in the morning if she cannot get ride.

## 2025-01-15 ENCOUNTER — HOSPITAL ENCOUNTER (OUTPATIENT)
Facility: HOSPITAL | Age: 55
Setting detail: HOSPITAL OUTPATIENT SURGERY
Discharge: HOME | End: 2025-01-15
Attending: INTERNAL MEDICINE | Admitting: INTERNAL MEDICINE
Payer: COMMERCIAL

## 2025-01-15 ENCOUNTER — ANESTHESIA EVENT (OUTPATIENT)
Dept: ENDOSCOPY | Facility: HOSPITAL | Age: 55
End: 2025-01-15
Payer: COMMERCIAL

## 2025-01-15 ENCOUNTER — ANESTHESIA (OUTPATIENT)
Dept: ENDOSCOPY | Facility: HOSPITAL | Age: 55
End: 2025-01-15
Payer: COMMERCIAL

## 2025-01-15 VITALS
SYSTOLIC BLOOD PRESSURE: 132 MMHG | DIASTOLIC BLOOD PRESSURE: 74 MMHG | BODY MASS INDEX: 49 KG/M2 | RESPIRATION RATE: 16 BRPM | OXYGEN SATURATION: 99 % | WEIGHT: 287 LBS | HEIGHT: 64 IN | HEART RATE: 90 BPM

## 2025-01-15 DIAGNOSIS — Z12.11 COLON CANCER SCREENING: ICD-10-CM

## 2025-01-15 DIAGNOSIS — K21.9 GASTROESOPHAGEAL REFLUX DISEASE, UNSPECIFIED WHETHER ESOPHAGITIS PRESENT: ICD-10-CM

## 2025-01-15 DIAGNOSIS — R93.89 ABNORMAL CT SCAN: ICD-10-CM

## 2025-01-15 PROBLEM — K57.30 COLON, DIVERTICULOSIS: Status: ACTIVE | Noted: 2025-01-15

## 2025-01-15 PROBLEM — R58 INTERNAL HEMORRHAGE: Status: ACTIVE | Noted: 2025-01-15

## 2025-01-15 PROBLEM — K63.5 POLYP OF COLON: Status: ACTIVE | Noted: 2025-01-15

## 2025-01-15 PROBLEM — K44.9 HIATAL HERNIA: Status: ACTIVE | Noted: 2025-01-15

## 2025-01-15 PROBLEM — K29.30 CHRONIC SUPERFICIAL GASTRITIS WITHOUT BLEEDING: Status: ACTIVE | Noted: 2025-01-15

## 2025-01-15 PROCEDURE — 0DB28ZX EXCISION OF MIDDLE ESOPHAGUS, VIA NATURAL OR ARTIFICIAL OPENING ENDOSCOPIC, DIAGNOSTIC: ICD-10-PCS | Performed by: INTERNAL MEDICINE

## 2025-01-15 PROCEDURE — 0DBP8ZX EXCISION OF RECTUM, VIA NATURAL OR ARTIFICIAL OPENING ENDOSCOPIC, DIAGNOSTIC: ICD-10-PCS | Performed by: INTERNAL MEDICINE

## 2025-01-15 PROCEDURE — 0DB38ZX EXCISION OF LOWER ESOPHAGUS, VIA NATURAL OR ARTIFICIAL OPENING ENDOSCOPIC, DIAGNOSTIC: ICD-10-PCS | Performed by: INTERNAL MEDICINE

## 2025-01-15 PROCEDURE — 0DBL8ZX EXCISION OF TRANSVERSE COLON, VIA NATURAL OR ARTIFICIAL OPENING ENDOSCOPIC, DIAGNOSTIC: ICD-10-PCS | Performed by: INTERNAL MEDICINE

## 2025-01-15 PROCEDURE — 45380 COLONOSCOPY AND BIOPSY: CPT | Performed by: INTERNAL MEDICINE

## 2025-01-15 PROCEDURE — 0DB98ZX EXCISION OF DUODENUM, VIA NATURAL OR ARTIFICIAL OPENING ENDOSCOPIC, DIAGNOSTIC: ICD-10-PCS | Performed by: INTERNAL MEDICINE

## 2025-01-15 PROCEDURE — 43239 EGD BIOPSY SINGLE/MULTIPLE: CPT | Performed by: INTERNAL MEDICINE

## 2025-01-15 PROCEDURE — 0DB78ZX EXCISION OF STOMACH, PYLORUS, VIA NATURAL OR ARTIFICIAL OPENING ENDOSCOPIC, DIAGNOSTIC: ICD-10-PCS | Performed by: INTERNAL MEDICINE

## 2025-01-15 RX ORDER — SODIUM CHLORIDE, SODIUM LACTATE, POTASSIUM CHLORIDE, CALCIUM CHLORIDE 600; 310; 30; 20 MG/100ML; MG/100ML; MG/100ML; MG/100ML
INJECTION, SOLUTION INTRAVENOUS CONTINUOUS
Status: DISCONTINUED | OUTPATIENT
Start: 2025-01-15 | End: 2025-01-15

## 2025-01-15 RX ORDER — NALOXONE HYDROCHLORIDE 0.4 MG/ML
0.08 INJECTION, SOLUTION INTRAMUSCULAR; INTRAVENOUS; SUBCUTANEOUS ONCE AS NEEDED
Status: DISCONTINUED | OUTPATIENT
Start: 2025-01-15 | End: 2025-01-15

## 2025-01-15 RX ORDER — ALBUTEROL SULFATE 90 UG/1
INHALANT RESPIRATORY (INHALATION) AS NEEDED
Status: DISCONTINUED | OUTPATIENT
Start: 2025-01-15 | End: 2025-01-15 | Stop reason: SURG

## 2025-01-15 RX ORDER — LIDOCAINE HYDROCHLORIDE 10 MG/ML
INJECTION, SOLUTION EPIDURAL; INFILTRATION; INTRACAUDAL; PERINEURAL AS NEEDED
Status: DISCONTINUED | OUTPATIENT
Start: 2025-01-15 | End: 2025-01-15 | Stop reason: SURG

## 2025-01-15 RX ADMIN — LIDOCAINE HYDROCHLORIDE 50 MG: 10 INJECTION, SOLUTION EPIDURAL; INFILTRATION; INTRACAUDAL; PERINEURAL at 10:30:00

## 2025-01-15 RX ADMIN — ALBUTEROL SULFATE 4 PUFF: 90 INHALANT RESPIRATORY (INHALATION) at 10:39:00

## 2025-01-15 NOTE — ANESTHESIA PREPROCEDURE EVALUATION
Anesthesia PreOp Note    HPI:     Damari Waterman is a 54 year old female who presents for preoperative consultation requested by: Natacha Kan MD    Date of Surgery: 1/15/2025    Procedure(s):  COLONOSCOPY  ESOPHAGOGASTRODUODENOSCOPY (EGD)  Indication: Colon cancer screening /Gastroesophageal reflux disease, unspecified whether esophagitis present /Abnormal CT scan    Relevant Problems   No relevant active problems       NPO:  Last Liquid Consumption Date: 01/14/25  Last Liquid Consumption Time: 1930  Last Solid Consumption Date: 01/14/25  Last Solid Consumption Time: 1600  Last Liquid Consumption Date: 01/14/25          History Review:  Patient Active Problem List    Diagnosis Date Noted    Hidradenitis suppurativa 12/24/2024    Glaucoma of left eye 11/18/2024    Prediabetes 11/18/2024    Inflammatory polyarthritis (HCC) 09/26/2024    Rash 09/26/2024    Asthma (HCC) 04/15/2024    Benign essential hypertension 09/20/2023    Uterine leiomyoma 03/29/2023    Restless leg syndrome 06/22/2021    Morbid obesity (HCC) 05/15/2020    Bipolar II disorder (HCC) 03/08/2018    Generalized anxiety disorder 03/08/2018    Discoid lupus 02/08/2018    Obstructive sleep apnea (adult) (pediatric) 09/14/2017    Gastroesophageal reflux disease 07/25/2017       Past Medical History:    Acute bilateral low back pain    Anemia    Anesthesia complication    had difficulty waking up after gastric sleeve surgery in 2018    Asthma (HCC)    Bronchitis    Cataract    right eye    Essential hypertension    Glaucoma    left eye    High blood pressure    Hypokalemia    Interscapular pain    Last Assessment & Plan:    Formatting of this note might be different from the original.   Patient reports pain is a 2/10 today.      Lupus    Neck pain    Last Assessment & Plan:    Formatting of this note might be different from the original.   Patient reports pain is a 2/10 today.      Osteoarthritis    Prediabetes    Right otitis media    Sleep apnea     Slow transit constipation    Type 2 diabetes mellitus without complication (HCC)    Visual impairment    reading glasses       Past Surgical History:   Procedure Laterality Date    Lap gastric bypass/daniel-en-y      Total abdom hysterectomy         Prescriptions Prior to Admission[1]  Current Medications and Prescriptions Ordered in Epic[2]    Allergies[3]    History reviewed. No pertinent family history.  Social History     Socioeconomic History    Marital status: Single   Tobacco Use    Smoking status: Never     Passive exposure: Never    Smokeless tobacco: Never   Vaping Use    Vaping status: Never Used   Substance and Sexual Activity    Alcohol use: Never    Drug use: Never   Other Topics Concern    Reaction to local anesthetic No    Pt has a pacemaker No    Pt has a defibrillator No       Available pre-op labs reviewed.             Vital Signs:  Body mass index is 49.26 kg/m².   height is 1.626 m (5' 4\") and weight is 130.2 kg (287 lb). Her blood pressure is 132/66 and her pulse is 83. Her respiration is 14 and oxygen saturation is 97%.   Vitals:    01/09/25 1050 01/15/25 0956   BP:  132/66   Pulse:  83   Resp:  14   SpO2:  97%   Weight: 130.2 kg (287 lb)    Height: 1.626 m (5' 4\")         Anesthesia Evaluation     Patient summary reviewed and Nursing notes reviewed    No history of anesthetic complications   Airway   Mallampati: III  TM distance: >3 FB  Neck ROM: full  Dental - Dentition appears grossly intact     Pulmonary - normal exam   (+) asthma, sleep apnea on CPAP  Cardiovascular - normal exam  Exercise tolerance: good  (+) hypertension well controlled    Rhythm: regular  Rate: normal    Neuro/Psych    (+)   bipolar disorder      GI/Hepatic/Renal    (+) GERD well controlled, bowel prep    Endo/Other    (+) diabetes mellitus type 2 well controlled  Abdominal   (+) obese                 Anesthesia Plan:   ASA:  3  Plan:   MAC  Post-op Pain Management: IV analgesics  Informed Consent Plan and Risks  Discussed With:  Patient  Use of Blood Products Discussed With:  Patient  Discussed plan with:  CRNA and surgeon      I have informed Damari Waterman and/or legal guardian or family member of the nature of the anesthetic plan, benefits, risks including possible dental damage if relevant, major complications, and any alternative forms of anesthetic management.   All of the patient's questions were answered to the best of my ability. The patient desires the anesthetic management as planned.  Emma Borja CRNA  1/15/2025 10:27 AM  Present on Admission:  **None**           [1]   Facility-Administered Medications Prior to Admission   Medication Dose Route Frequency Provider Last Rate Last Admin    [COMPLETED] Secukinumab (300 MG Dose) SOSY 300 mg  300 mg Subcutaneous Once Edvin Pearson MD   300 mg at 11/18/24 1527     Medications Prior to Admission   Medication Sig Dispense Refill Last Dose/Taking    Acetaminophen ER (TYLENOL 8 HOUR ARTHRITIS PAIN) 650 MG Oral Tab CR Take 1 tablet (650 mg total) by mouth every 8 (eight) hours as needed for Pain.   1/14/2025    losartan 100 MG Oral Tab Take 1 tablet (100 mg total) by mouth daily. 90 tablet 0 1/14/2025    predniSONE 5 MG Oral Tab Take 4 tablets (20 mg total) by mouth daily for 3 days, THEN 3 tablets (15 mg total) daily for 3 days, THEN 2 tablets (10 mg total) daily for 3 days, THEN 1 tablet (5 mg total) daily for 3 days, THEN 1 tablet (5 mg total) daily as needed. Take as instructed in the morning with breakfast for 12 days.  Then, after 12 days, only take prednisone up to 1 tablet daily as needed for breakthrough pain.. 60 tablet 0 1/1/2025    amLODIPine 5 MG Oral Tab Take 1 tablet (5 mg total) by mouth daily. (Patient taking differently: Take 0.5 tablets (2.5 mg total) by mouth daily.) 90 tablet 3 1/15/2025 Morning    furosemide 20 MG Oral Tab Take 1-2 tablets (20-40 mg total) by mouth daily as needed. 90 tablet 0 1/1/2025    CUSTOM MEDICATION  Semaglutide/cyanocobalamin injection    Concentration: 1 mg/ 0.5 mL   Amount: 5 ML vial with supplies  Instructions: Inject 0.25 mL weekly (Patient taking differently: 2.5 mg once a week. Semaglutide/cyanocobalamin injection    Concentration: 1 mg/ 0.5 mL   Amount: 5 ML vial with supplies  Instructions: Inject 0.25 mL weekly    Every Monday) 1 each 0 1/6/2025    COSENTYX UNOREADY 300 MG/2ML Subcutaneous Solution Auto-injector every 30 (thirty) days.   1/6/2025    clindamycin 1 % External Lotion Apply twice daily with flares (Patient taking differently: as needed. Apply twice daily with flares) 60 mL 11 Taking Differently    PANTOPRAZOLE 40 MG Oral Tab EC TAKE 1 TABLET(40 MG) BY MOUTH TWICE DAILY BEFORE MEALS 180 tablet 0 1/14/2025    fluticasone propionate 50 MCG/ACT Nasal Suspension 2 sprays by Nasal route daily. (Patient taking differently: 2 sprays by Nasal route as needed.) 48 g 0 Taking Differently    estradiol 1 MG Oral Tab Take 1 tablet (1 mg total) by mouth daily. 90 tablet 1 1/14/2025    hydroxychloroquine 200 MG Oral Tab Take 2 tablets (400 mg total) by mouth daily. 180 tablet 2 1/14/2025    naproxen 500 MG Oral Tab Take 1 tablet (500 mg total) by mouth 2 (two) times daily as needed.   Taking As Needed    carvedilol 12.5 MG Oral Tab Take 1 tablet (12.5 mg total) by mouth 2 (two) times daily with meals.   1/15/2025 Morning    latanoprost 0.005 % Ophthalmic Solution Place 1 drop into both eyes nightly.   Taking    spironolactone 25 MG Oral Tab Take 1 tablet (25 mg total) by mouth daily.   1/15/2025 Morning    Calcium Citrate-Vitamin D 200-6.25 MG-MCG Oral Tab Take 1 tablet by mouth daily.   1/8/2025    ferrous sulfate 325 (65 FE) MG Oral Tab EC Take 1 tablet (325 mg total) by mouth every other day.   Taking    Multiple Vitamins-Minerals (MULTI-VITAMIN/MINERALS) Oral Tab Take 1 tablet by mouth daily.   1/8/2025    montelukast 10 MG Oral Tab Take 1 tablet (10 mg total) by mouth daily.   1/14/2025    Blood  Pressure Monitor Does not apply Device Please monitor blood pressure once daily 1 each 0     CUSTOM MEDICATION Semaglutide/cyanocobalamin injection    Concentration: 5 mg/ 0.5 mL   Amount: 2.5 mL vial x2 (total of 5 mL) with supplies  Instructions: Inject 0.1 mL weekly subcutaneous 1 each 0     Benzoyl Peroxide (BENZOYL PEROXIDE WASH) 10 % External Liquid Use daily as wash in shower to affected areas (Patient taking differently: Use daily as wash in shower to affected areas    Not started. Currently using Cerave facial wash.) 227 g 3     PEG 3350-KCl-Na Bicarb-NaCl (TRILYTE) 420 g Oral Recon Soln Take prep as directed by gastro office. May substitute with Trilyte/generic equivalent if needed. 1 each 0    [2]   Current Facility-Administered Medications Ordered in Epic   Medication Dose Route Frequency Provider Last Rate Last Admin    lactated ringers infusion   Intravenous Continuous Natacha Kan MD 20 mL/hr at 01/15/25 1004 New Bag at 01/15/25 1004     No current [facility]-ordered outpatient medications on file.   [3] No Known Allergies

## 2025-01-15 NOTE — OPERATIVE REPORT
ESOPHAGOGASTRODUODENOSCOPY (EGD) & COLONOSCOPY REPORT    Damari Waterman     1970 Age 54 year old   PCP No primary care provider on file. Endoscopist Natacha Kan MD     Date of procedure: 01/15/25    Procedure: EGD w/ cold biopsy & Colonoscopy w/ cold biopsy    Pre-operative diagnosis: abnormal CT - esophagus, GERD, epigastric pain, CRC screening, family history of colon cancer     Post-operative diagnosis: s/p gastric sleeve anatomy, small hiatal hernia, colon and rectal polyps, internal hemorrhoids, colon diverticulosis     Medications: MAC    Withdrawal time: 35 minutes    Complications: none    Procedure: Informed consent was obtained from the patient after the risks of the procedure were discussed, including but not limited to bleeding, perforation, aspiration, infection, or possibility of a missed lesion. We discussed the risks/benefits and alternatives to this procedure, as well as the planned sedation. EGD procedure: The patient was placed in the left lateral decubitus position and begun on continuous blood pressure pulse oximetry and EKG monitoring and this was maintained throughout the procedure. Once an adequate level of sedation was obtained a bite block was placed. Then the lubricated tip of the [id number] diagnostic video upper endoscope was inserted and advanced using direct visualization into the posterior pharynx and ultimately into the esophagus. Colonoscopy procedure: Once an adequate level of sedation was obtained a digital rectal exam was completed. Then the lubricated tip of the [id number] diagnostic video colonoscope was inserted and advanced without difficulty to the cecum using the CO2 insufflation technique. The cecum was identified by localizing the trifold, the appendix and the ileocecal valve. A routine second examination of the cecum/ascending colon was performed. Withdrawal was begun with thorough washing and careful examination of the colonic walls and folds.  Photodocumentation was obtained. The bowel prep was fair. Views of the colon were good with washing. I then carefully withdrew the instrument from the patient who tolerated the procedure well.     Complications: None    EGD findings:      1. Esophagus: The squamocolumnar junction was noted at 37 cm and appeared unremarkable. The diaphragmatic pinch was noted noted at 39 cm from the incisors. There was a 2 cm hiatal hernia. The esophageal mucosa appeared unremarkable and cold forceps biopsies were taken of the distal and mid esophagus.   2. Stomach: The stomach distended normally. Normal rugal folds were seen. The pylorus was patent. The gastric mucosa appeared mildly erythematous diffusely. Gastric sleeve anatomy was noted. Retroflexion revealed a mildly patulous cardia.   3. Duodenum: The duodenal mucosa appeared normal in the 1st and 2nd portion of the duodenum. Cold forceps biopsies were taken of the bulb and descending duodenum.     Colonoscopy findings:    1. 2 polyps noted as follows:      A. 2 mm polyp in the transverse colon; sessile morphology; completely removed by cold forceps biopsies and retrieved.      B. 2 mm polyp in the rectum; sessile morphology; completely removed by cold forceps biopsies and retrieved.  2. Diverticulosis: mild in the sigmoid colon.  3. Terminal ileum: the visualized mucosa appeared normal.  4. A retroflexed view of the rectum revealed small internal hemorrhoids.  5. The colonic mucosa throughout the colon showed normal vascular pattern, without evidence of angioectasias or inflammation.   6. HAFSA: normal rectal tone, no masses palpated.     Impression:  2 cm hiatal hernia.  S/p gastric sleeve anatomy.   Mild gastritis  Otherwise unremarkable EGD s/p biopsies of the esophagus, stomach, and duodenum.   2 small colon/rectal polyps, resected and retrieved.  Mild sigmoid colon diverticulosis.  Small internal hemorrhoids.  Fair prep that was good after aggressive washing.       Recommend:  Await pathology. The interval for the next colonoscopy will be determined after reviewing pathology (likely 3-5 years). If new signs or symptoms develop, colonoscopy may need to be repeated sooner.   Recommend an extended bowel prep for the next colonoscopy.   High fiber diet.  Monitor for blood in the stool. If having more than just tinge of blood, call office or go to the ER.  Take pantoprazole 40 mg twice a day.  Follow up in GI clinic in 2-4 weeks.     >>>If tissue was obtained and you have not received your pathology results either by phone or letter within 2 weeks, please call our office at [phone].    Specimens: duodenum, stomach, esophagus, transverse colon polyp, rectal polyp     Blood loss: <1 ml

## 2025-01-15 NOTE — H&P
History & Physical Examination    Patient Name: Damari Waterman  MRN: [id number]  [facility]: [id number]  YOB: 1970    Diagnosis: abnormal CT - esophagus, GERD, epigastric pain, CRC screening, family history of colon cancer    Prescriptions Prior to Admission[1]  No current facility-administered medications for this encounter.       Allergies: Allergies[2]    Past Medical History:    Acute bilateral low back pain    Anemia    Anesthesia complication    had difficulty waking up after gastric sleeve surgery in 2018    Asthma (HCC)    Bronchitis    Cataract    right eye    Essential hypertension    Glaucoma    left eye    High blood pressure    Hypokalemia    Interscapular pain    Last Assessment & Plan:    Formatting of this note might be different from the original.   Patient reports pain is a 2/10 today.      Lupus    Neck pain    Last Assessment & Plan:    Formatting of this note might be different from the original.   Patient reports pain is a 2/10 today.      Osteoarthritis    Prediabetes    Right otitis media    Sleep apnea    Slow transit constipation    Type 2 diabetes mellitus without complication (HCC)    Visual impairment    reading glasses     Past Surgical History:   Procedure Laterality Date    Lap gastric bypass/daniel-en-y      Total abdom hysterectomy       History reviewed. No pertinent family history.  Social History     Tobacco Use    Smoking status: Never     Passive exposure: Never    Smokeless tobacco: Never   Substance Use Topics    Alcohol use: Never         SYSTEM Check if Review is Normal Check if Physical Exam is Normal If not normal, please explain:   HEENT [X ] [ X]    NECK  [X ] [ X]    HEART [X ] [ X]    LUNGS [X ] [ X]    ABDOMEN [X ] [ X]    EXTREMITIES [X ] [ X]    OTHER        I have discussed the risks and benefits and alternatives of the procedure with the patient/family.  They understand and agree to proceed with plan of care.   I have reviewed the History and Physical  done within the last 30 days.  Any changes noted above.    Natacha Kan MD  [facility] Gastroenterology  1/15/2025               [1]   Facility-Administered Medications Prior to Admission   Medication Dose Route Frequency Provider Last Rate Last Admin    [COMPLETED] Secukinumab (300 MG Dose) SOSY 300 mg  300 mg Subcutaneous Once Edvin Pearson MD   300 mg at 11/18/24 1527     Medications Prior to Admission   Medication Sig Dispense Refill Last Dose/Taking    Acetaminophen ER (TYLENOL 8 HOUR ARTHRITIS PAIN) 650 MG Oral Tab CR Take 1 tablet (650 mg total) by mouth every 8 (eight) hours as needed for Pain.   Taking As Needed    losartan 100 MG Oral Tab Take 1 tablet (100 mg total) by mouth daily. 90 tablet 0 Taking    predniSONE 5 MG Oral Tab Take 4 tablets (20 mg total) by mouth daily for 3 days, THEN 3 tablets (15 mg total) daily for 3 days, THEN 2 tablets (10 mg total) daily for 3 days, THEN 1 tablet (5 mg total) daily for 3 days, THEN 1 tablet (5 mg total) daily as needed. Take as instructed in the morning with breakfast for 12 days.  Then, after 12 days, only take prednisone up to 1 tablet daily as needed for breakthrough pain.. 60 tablet 0 Taking As Needed    amLODIPine 5 MG Oral Tab Take 1 tablet (5 mg total) by mouth daily. (Patient taking differently: Take 0.5 tablets (2.5 mg total) by mouth daily.) 90 tablet 3 Taking Differently    furosemide 20 MG Oral Tab Take 1-2 tablets (20-40 mg total) by mouth daily as needed. 90 tablet 0 Taking As Needed    CUSTOM MEDICATION Semaglutide/cyanocobalamin injection    Concentration: 1 mg/ 0.5 mL   Amount: 5 ML vial with supplies  Instructions: Inject 0.25 mL weekly (Patient taking differently: 2.5 mg once a week. Semaglutide/cyanocobalamin injection    Concentration: 1 mg/ 0.5 mL   Amount: 5 ML vial with supplies  Instructions: Inject 0.25 mL weekly    Every Monday) 1 each 0 Taking Differently    COSENTYX UNOREADY 300 MG/2ML Subcutaneous Solution Auto-injector  every 30 (thirty) days.   Taking    clindamycin 1 % External Lotion Apply twice daily with flares (Patient taking differently: as needed. Apply twice daily with flares) 60 mL 11 Taking Differently    PANTOPRAZOLE 40 MG Oral Tab EC TAKE 1 TABLET(40 MG) BY MOUTH TWICE DAILY BEFORE MEALS 180 tablet 0 Taking    fluticasone propionate 50 MCG/ACT Nasal Suspension 2 sprays by Nasal route daily. (Patient taking differently: 2 sprays by Nasal route as needed.) 48 g 0 Taking Differently    estradiol 1 MG Oral Tab Take 1 tablet (1 mg total) by mouth daily. 90 tablet 1 Taking    hydroxychloroquine 200 MG Oral Tab Take 2 tablets (400 mg total) by mouth daily. 180 tablet 2 Taking    naproxen 500 MG Oral Tab Take 1 tablet (500 mg total) by mouth 2 (two) times daily as needed.   Taking As Needed    carvedilol 12.5 MG Oral Tab Take 1 tablet (12.5 mg total) by mouth 2 (two) times daily with meals.   Taking    latanoprost 0.005 % Ophthalmic Solution Place 1 drop into both eyes nightly.   Taking    spironolactone 25 MG Oral Tab Take 1 tablet (25 mg total) by mouth daily.   Taking    Calcium Citrate-Vitamin D 200-6.25 MG-MCG Oral Tab Take 1 tablet by mouth daily.   Taking    ferrous sulfate 325 (65 FE) MG Oral Tab EC Take 1 tablet (325 mg total) by mouth every other day.   Taking    Multiple Vitamins-Minerals (MULTI-VITAMIN/MINERALS) Oral Tab Take 1 tablet by mouth daily.   Taking    montelukast 10 MG Oral Tab Take 1 tablet (10 mg total) by mouth daily.   Taking    Blood Pressure Monitor Does not apply Device Please monitor blood pressure once daily 1 each 0     CUSTOM MEDICATION Semaglutide/cyanocobalamin injection    Concentration: 5 mg/ 0.5 mL   Amount: 2.5 mL vial x2 (total of 5 mL) with supplies  Instructions: Inject 0.1 mL weekly subcutaneous 1 each 0     Benzoyl Peroxide (BENZOYL PEROXIDE WASH) 10 % External Liquid Use daily as wash in shower to affected areas (Patient taking differently: Use daily as wash in shower to affected  areas    Not started. Currently using Cerave facial wash.) 227 g 3     PEG 3350-KCl-Na Bicarb-NaCl (TRILYTE) 420 g Oral Recon Soln Take prep as directed by gastro office. May substitute with Trilyte/generic equivalent if needed. 1 each 0    [2] No Known Allergies

## 2025-01-15 NOTE — ANESTHESIA POSTPROCEDURE EVALUATION
Patient: Damari Waterman    Procedure Summary       Date: 01/15/25 Room / Location: [facility] ENDOSCOPY 04 / [facility] ENDOSCOPY    Anesthesia Start: 1028 Anesthesia Stop: 1142    Procedures:       COLONOSCOPY      ESOPHAGOGASTRODUODENOSCOPY (EGD) Diagnosis:       Colon cancer screening      Gastroesophageal reflux disease, unspecified whether esophagitis present      Abnormal CT scan      (small hiatal hernia, gastric sleeve anatomy, colon polyp, diverticulosis)    Surgeons: Natacha Kan MD Anesthesiologist: Emma Borja CRNA    Anesthesia Type: MAC ASA Status: 3            Anesthesia Type: MAC    Vitals Value Taken Time   /70 01/15/25 1140   Temp 97 01/15/25 1142   Pulse 97 01/15/25 1141   Resp 10 01/15/25 1141   SpO2 98 % 01/15/25 1141   Vitals shown include unfiled device data.    [facility] AN Post Evaluation:   Patient Evaluated in PACU  Patient Participation: complete - patient participated  Level of Consciousness: awake and alert  Pain Score: 0  Pain Management: adequate  Airway Patency:patent  Yes    Nausea/Vomiting: none  Cardiovascular Status: acceptable  Respiratory Status: acceptable  Postoperative Hydration acceptable      Emma Borja CRNA  1/15/2025 11:42 AM

## 2025-01-15 NOTE — DISCHARGE INSTRUCTIONS
Home Care Instructions for Colonoscopy  Diet:  - Resume your regular diet as tolerated unless otherwise instructed.  - Start with light meals to minimize bloating.  - Do not drink alcohol today.    Medication:  - If you have questions about resuming your normal medications, please contact your Primary Care Physician.    Activities:  - Take it easy today. Do not return to work today.  - Do not drive today.  - Do not operate any machinery today (including kitchen equipment).    Colonoscopy:  - You may notice some rectal \"spotting\" (a little blood on the toilet tissue) for a day or two after the exam. This is normal.  - If you experience any rectal bleeding (not spotting), persistent tenderness or sharp severe abdominal pains, oral temperature over 100 degrees Fahrenheit, light-headedness or dizziness, or any other problems, contact your doctor.      **If unable to reach your doctor, please go to the [facility] Emergency Room**    - Your referring physician will receive a full report of your examination.  - If you do not hear from your doctor's office within two weeks of your biopsy, please call them for your results.    You may be able to see your laboratory results in [employer] between 4 and 7 business days.  In some cases, your physician may not have viewed the results before they are released to [employer].  If you have questions regarding your results contact the physician who ordered the test/exam by phone or via [employer] by choosing \"Ask a Medical Question.\"

## 2025-01-16 RX ORDER — FLUTICASONE PROPIONATE 50 MCG
2 SPRAY, SUSPENSION (ML) NASAL DAILY
Qty: 48 G | Refills: 3 | Status: SHIPPED | OUTPATIENT
Start: 2025-01-16

## 2025-01-17 NOTE — TELEPHONE ENCOUNTER
Refill passed per [facility] protocol.    Please call the patient to see how she is taking the medication, daily VS as needed     Requested Prescriptions   Pending Prescriptions Disp Refills    FLUTICASONE PROPIONATE 50 MCG/ACT Nasal Suspension [Pharmacy Med Name: FLUTICASONE 50MCG NASAL SP (120) RX] 48 g 0     Sig: SHAKE LIQUID AND USE 2 SPRAYS IN EACH NOSTRIL DAILY       Allergy Medication Protocol Passed - 1/16/2025  8:20 PM        Passed - In person appointment or virtual visit in the past 12 mos or appointment in next 3 mos     Recent Outpatient Visits              2 weeks ago Routine general medical examination at a health care facility    [facility] Susan Darnell APRN    Office Visit    3 weeks ago Discoid lupus    [facility], [place], [place], DO    Office Visit    4 weeks ago Bipolar II disorder ([facility])    [facility] Mandy Wilcox MD    Office Visit    1 month ago Inflammatory polyarthritis (HCC)    [facility] Tal Nascimento MD    Office Visit    1 month ago Hidradenitis suppurativa    [facility]    Nurse Only          Future Appointments         Provider Department Appt Notes    In 1 week Edvin Pearson MD [facility]     In 1 week Nataly Sena RD [facility]     In 1 week Ela Mccabe [facility]    In 2 weeks Oliver Su MD [facility] Osteoarthritis of both knees, unspecified osteoarthritis type    In 3 weeks Oliver Su MD [facility] Left shoulder pain    In 1 month [facility] Wilcox [facility]     In 2 months Isela Trimble DO [facility], [place] 3 month f/u                    Passed - Medication is active on med list           Future Appointments         Provider Department Appt Notes    In 1 week Edvin Pearson MD [facility], [place]     In 1 week Nataly Sena RD [facility], [place]     In 1 week Ela Mccabe CNM [facility], [place] [facility] f/u    In 2 weeks Oliver Su MD [facility], [place] Osteoarthritis of both knees, unspecified osteoarthritis type    In 3 weeks Oliver Su MD [facility], [place] Left shoulder pain    In 1 month Mandy Wilcox MD [facility]     In 2 months Isela Trimble DO [facility], [place] 3 month f/u          Recent Outpatient Visits              2 weeks ago Routine general medical examination at a health care facility    [facility], [place] Susan Darnell APRN    Office Visit    3 weeks ago Discoid lupus    [facility] [address], [place] Isela Trimble DO    Office Visit    4 weeks ago Bipolar II disorder (HCC)    [facility] Mandy Wilcox MD    Office Visit    1 month ago Inflammatory polyarthritis (HCC)    [facility] Tal Nascimento MD    Office Visit    1 month ago Hidradenitis suppurativa    [facility]    Nurse Only

## 2025-01-17 NOTE — TELEPHONE ENCOUNTER
Refill Passed Per Protocol    Requested Prescriptions   Pending Prescriptions Disp Refills    FLUTICASONE PROPIONATE 50 MCG/ACT Nasal Suspension [Pharmacy Med Name: FLUTICASONE 50MCG NASAL SP (120) RX] 48 g 3     Sig: SHAKE LIQUID AND USE 2 SPRAYS IN EACH NOSTRIL DAILY       Allergy Medication Protocol Passed - 1/16/2025 10:48 PM        Passed - In person appointment or virtual visit in the past 12 mos or appointment in next 3 mos     Recent Outpatient Visits              2 weeks ago Routine general medical examination at a health care facility    [facility] Susan Darnell APRN    Office Visit    3 weeks ago Discoid lupus    [facility] [place], [place], DO    Office Visit    4 weeks ago Bipolar II disorder ([facility])    [facility] Mandy Wilcox MD    Office Visit    1 month ago Inflammatory polyarthritis ([facility])    [facility] Tal Nascimento MD    Office Visit    1 month ago Hidradenitis suppurativa    [facility]    Nurse Only          Future Appointments         Provider Department Appt Notes    In 1 week Edvin Pearson MD [facility]     In 1 week Nataly Sena RD [facility]     In 1 week Ela Mccabe CNM [facility] f/u    In 2 weeks Oliver Su MD [facility] Osteoarthritis of both knees, unspecified osteoarthritis type    In 3 weeks Oliver Su MD [facility] Left shoulder pain    In 1 month Mandy Wilcox MD [facility], [place], [place]     In 2 months Isela Trimble DO [facility] 3  month f/u                    Passed - Medication is active on med list             Future Appointments         Provider Department Appt Notes    In 1 week Edvin Pearson MD [facility], [place]     In 1 week Nataly Sena RD [facility], [place]     In 1 week Ela Mccabe CNM [facility] f/u    In 2 weeks Oliver Su MD [facility] Osteoarthritis of both knees, unspecified osteoarthritis type    In 3 weeks Oliver Su MD [facility], [place] Left shoulder pain    In 1 month Mandy Wilcox MD [facility] [place], [place]     In 2 months Isela Trimble DO [facility], [place] 3 month f/u          Recent Outpatient Visits              2 weeks ago Routine general medical examination at a health care facility    [facility] [place]    Office Visit    3 weeks ago Discoid lupus    [facility] [address], [place] Isela Trimble DO    Office Visit    4 weeks ago Bipolar II disorder (HCC)    [facility] Mandy Wilcox MD    Office Visit    1 month ago Inflammatory polyarthritis (HCC)    [facility] Tal Nascimento MD    Office Visit    1 month ago Hidradenitis suppurativa    [facility]    Nurse Only

## 2025-01-20 RX ORDER — AMLODIPINE BESYLATE 5 MG/1
5 TABLET ORAL DAILY
Qty: 90 TABLET | Refills: 3 | OUTPATIENT
Start: 2025-01-20

## 2025-01-20 RX ORDER — PANTOPRAZOLE SODIUM 40 MG/1
40 TABLET, DELAYED RELEASE ORAL
Qty: 180 TABLET | Refills: 0 | Status: SHIPPED | OUTPATIENT
Start: 2025-01-20

## 2025-01-20 NOTE — TELEPHONE ENCOUNTER
Requested Prescriptions     Pending Prescriptions Disp Refills    pantoprazole 40 MG Oral Tab  tablet 0     Sig: Take 1 tablet (40 mg total) by mouth 2 (two) times daily before meals.         LOV   5/6/2024  Last procedure 1/15/2025      LR    10/21/2024      PA

## 2025-01-21 ENCOUNTER — TELEPHONE (OUTPATIENT)
Dept: ORTHOPEDICS CLINIC | Facility: CLINIC | Age: 55
End: 2025-01-21

## 2025-01-21 NOTE — TELEPHONE ENCOUNTER
Patient calling stating she's experiencing bad shoulder pain and would like to know if she can be seen sooner that 2/10?Please advise

## 2025-01-21 NOTE — TELEPHONE ENCOUNTER
S/w patient and booked her for 3:20 on 1/22/25 with Dr Su for right shoulder pain. She accepted and had no other questions.

## 2025-01-22 ENCOUNTER — OFFICE VISIT (OUTPATIENT)
Dept: ORTHOPEDICS CLINIC | Facility: CLINIC | Age: 55
End: 2025-01-22

## 2025-01-22 ENCOUNTER — HOSPITAL ENCOUNTER (OUTPATIENT)
Dept: GENERAL RADIOLOGY | Facility: HOSPITAL | Age: 55
Discharge: HOME | End: 2025-01-22
Attending: ORTHOPAEDIC SURGERY
Payer: COMMERCIAL

## 2025-01-22 VITALS — HEART RATE: 86 BPM | DIASTOLIC BLOOD PRESSURE: 78 MMHG | SYSTOLIC BLOOD PRESSURE: 135 MMHG | RESPIRATION RATE: 18 BRPM

## 2025-01-22 DIAGNOSIS — R52 PAIN: ICD-10-CM

## 2025-01-22 DIAGNOSIS — R52 PAIN: Primary | ICD-10-CM

## 2025-01-22 DIAGNOSIS — M75.51 SUBACROMIAL BURSITIS OF RIGHT SHOULDER JOINT: ICD-10-CM

## 2025-01-22 PROCEDURE — 3078F DIAST BP <80 MM HG: CPT | Performed by: ORTHOPAEDIC SURGERY

## 2025-01-22 PROCEDURE — 20610 DRAIN/INJ JOINT/BURSA W/O US: CPT | Performed by: ORTHOPAEDIC SURGERY

## 2025-01-22 PROCEDURE — 73030 X-RAY EXAM OF SHOULDER: CPT | Performed by: ORTHOPAEDIC SURGERY

## 2025-01-22 PROCEDURE — 99213 OFFICE O/P EST LOW 20 MIN: CPT | Performed by: ORTHOPAEDIC SURGERY

## 2025-01-22 PROCEDURE — 3075F SYST BP GE 130 - 139MM HG: CPT | Performed by: ORTHOPAEDIC SURGERY

## 2025-01-22 RX ORDER — MELOXICAM 15 MG/1
15 TABLET ORAL DAILY
Qty: 30 TABLET | Refills: 0 | Status: SHIPPED | OUTPATIENT
Start: 2025-01-22

## 2025-01-22 RX ORDER — TRIAMCINOLONE ACETONIDE 40 MG/ML
40 INJECTION, SUSPENSION INTRA-ARTICULAR; INTRAMUSCULAR ONCE
Status: COMPLETED | OUTPATIENT
Start: 2025-01-22 | End: 2025-01-22

## 2025-01-22 RX ADMIN — TRIAMCINOLONE ACETONIDE 40 MG: 40 INJECTION, SUSPENSION INTRA-ARTICULAR; INTRAMUSCULAR at 15:56:00

## 2025-01-22 NOTE — PROGRESS NOTES
Per verbal order from Dr. Su, draw up and 4ml of 0.5% Marcaine and 1ml of Kenalog 40 for injection into right shoulder Tammi VICKERS RN  Patient provided education handout for cortisone injection.
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
## 2025-01-24 ENCOUNTER — MED REC SCAN ONLY (OUTPATIENT)
Dept: INTERNAL MEDICINE CLINIC | Facility: CLINIC | Age: 55
End: 2025-01-24

## 2025-01-27 ENCOUNTER — MED REC SCAN ONLY (OUTPATIENT)
Dept: INTERNAL MEDICINE CLINIC | Facility: CLINIC | Age: 55
End: 2025-01-27

## 2025-01-29 ENCOUNTER — OFFICE VISIT (OUTPATIENT)
Dept: SURGERY | Facility: CLINIC | Age: 55
End: 2025-01-29
Payer: COMMERCIAL

## 2025-01-29 ENCOUNTER — TELEPHONE (OUTPATIENT)
Dept: INTERNAL MEDICINE CLINIC | Facility: CLINIC | Age: 55
End: 2025-01-29

## 2025-01-29 ENCOUNTER — LAB ENCOUNTER (OUTPATIENT)
Dept: LAB | Facility: HOSPITAL | Age: 55
End: 2025-01-29
Payer: COMMERCIAL

## 2025-01-29 ENCOUNTER — OFFICE VISIT (OUTPATIENT)
Dept: OBGYN CLINIC | Facility: CLINIC | Age: 55
End: 2025-01-29

## 2025-01-29 VITALS — DIASTOLIC BLOOD PRESSURE: 81 MMHG | SYSTOLIC BLOOD PRESSURE: 149 MMHG | HEART RATE: 70 BPM

## 2025-01-29 VITALS — BODY MASS INDEX: 53.92 KG/M2 | HEIGHT: 61.8 IN | WEIGHT: 293 LBS

## 2025-01-29 DIAGNOSIS — E78.01 FAMILIAL HYPERCHOLESTEROLEMIA: Primary | ICD-10-CM

## 2025-01-29 DIAGNOSIS — Z00.00 ROUTINE GENERAL MEDICAL EXAMINATION AT A HEALTH CARE FACILITY: ICD-10-CM

## 2025-01-29 DIAGNOSIS — E66.01 MORBID OBESITY (HCC): Primary | ICD-10-CM

## 2025-01-29 DIAGNOSIS — R73.03 PREDIABETES: ICD-10-CM

## 2025-01-29 DIAGNOSIS — K21.9 GASTROESOPHAGEAL REFLUX DISEASE, UNSPECIFIED WHETHER ESOPHAGITIS PRESENT: ICD-10-CM

## 2025-01-29 DIAGNOSIS — N95.1 VASOMOTOR SYMPTOMS DUE TO MENOPAUSE: Primary | ICD-10-CM

## 2025-01-29 DIAGNOSIS — N95.1 VASOMOTOR SYMPTOMS DUE TO MENOPAUSE: ICD-10-CM

## 2025-01-29 DIAGNOSIS — I10 BENIGN ESSENTIAL HYPERTENSION: ICD-10-CM

## 2025-01-29 LAB
ALBUMIN SERPL-MCNC: 4.8 G/DL (ref 3.2–4.8)
ALBUMIN/GLOB SERPL: 1.7 {RATIO} (ref 1–2)
ALP LIVER SERPL-CCNC: 82 U/L
ALT SERPL-CCNC: 16 U/L
ANION GAP SERPL CALC-SCNC: 10 MMOL/L (ref 0–18)
AST SERPL-CCNC: 17 U/L (ref ?–34)
BILIRUB SERPL-MCNC: 0.9 MG/DL (ref 0.3–1.2)
BUN BLD-MCNC: 16 MG/DL (ref 9–23)
BUN/CREAT SERPL: 14.3 (ref 10–20)
CALCIUM BLD-MCNC: 10.1 MG/DL (ref 8.7–10.4)
CHLORIDE SERPL-SCNC: 103 MMOL/L (ref 98–112)
CHOLEST SERPL-MCNC: 222 MG/DL (ref ?–200)
CO2 SERPL-SCNC: 27 MMOL/L (ref 21–32)
CREAT BLD-MCNC: 1.12 MG/DL
DEPRECATED RDW RBC AUTO: 38.6 FL (ref 35.1–46.3)
EGFRCR SERPLBLD CKD-EPI 2021: 58 ML/MIN/1.73M2 (ref 60–?)
ERYTHROCYTE [DISTWIDTH] IN BLOOD BY AUTOMATED COUNT: 12.4 % (ref 11–15)
FASTING PATIENT LIPID ANSWER: YES
FASTING STATUS PATIENT QL REPORTED: YES
GLOBULIN PLAS-MCNC: 2.9 G/DL (ref 2–3.5)
GLUCOSE BLD-MCNC: 98 MG/DL (ref 70–99)
HCT VFR BLD AUTO: 37.1 %
HDLC SERPL-MCNC: 65 MG/DL (ref 40–59)
HGB BLD-MCNC: 12.9 G/DL
LDLC SERPL CALC-MCNC: 145 MG/DL (ref ?–100)
MCH RBC QN AUTO: 29.7 PG (ref 26–34)
MCHC RBC AUTO-ENTMCNC: 34.8 G/DL (ref 31–37)
MCV RBC AUTO: 85.5 FL
NONHDLC SERPL-MCNC: 157 MG/DL (ref ?–130)
OSMOLALITY SERPL CALC.SUM OF ELEC: 291 MOSM/KG (ref 275–295)
PLATELET # BLD AUTO: 309 10(3)UL (ref 150–450)
POTASSIUM SERPL-SCNC: 3.9 MMOL/L (ref 3.5–5.1)
PROT SERPL-MCNC: 7.7 G/DL (ref 5.7–8.2)
RBC # BLD AUTO: 4.34 X10(6)UL
SODIUM SERPL-SCNC: 140 MMOL/L (ref 136–145)
TRIGL SERPL-MCNC: 67 MG/DL (ref 30–149)
TSI SER-ACNC: 1.57 UIU/ML (ref 0.55–4.78)
VLDLC SERPL CALC-MCNC: 12 MG/DL (ref 0–30)
WBC # BLD AUTO: 7.1 X10(3) UL (ref 4–11)

## 2025-01-29 PROCEDURE — 36415 COLL VENOUS BLD VENIPUNCTURE: CPT

## 2025-01-29 PROCEDURE — 84443 ASSAY THYROID STIM HORMONE: CPT

## 2025-01-29 PROCEDURE — 85027 COMPLETE CBC AUTOMATED: CPT

## 2025-01-29 PROCEDURE — 3008F BODY MASS INDEX DOCD: CPT

## 2025-01-29 PROCEDURE — 99213 OFFICE O/P EST LOW 20 MIN: CPT | Performed by: ADVANCED PRACTICE MIDWIFE

## 2025-01-29 PROCEDURE — 80053 COMPREHEN METABOLIC PANEL: CPT

## 2025-01-29 PROCEDURE — 3077F SYST BP >= 140 MM HG: CPT | Performed by: ADVANCED PRACTICE MIDWIFE

## 2025-01-29 PROCEDURE — 3079F DIAST BP 80-89 MM HG: CPT | Performed by: ADVANCED PRACTICE MIDWIFE

## 2025-01-29 PROCEDURE — 80061 LIPID PANEL: CPT

## 2025-01-29 PROCEDURE — 97802 MEDICAL NUTRITION INDIV IN: CPT

## 2025-01-29 RX ORDER — ROSUVASTATIN CALCIUM 10 MG/1
10 TABLET, COATED ORAL NIGHTLY
Qty: 90 TABLET | Refills: 3 | Status: SHIPPED | OUTPATIENT
Start: 2025-01-29

## 2025-01-29 NOTE — PATIENT INSTRUCTIONS
Goals:     1.  Keep a food record, My Net Diary, select the macros dashboard.  2.  Strive to consume at least 4-6 meals/snacks per day.  Include protein and produce when you eat.  Aim  for 109-136 grams of protein per day.  Try to keep the carbohydrates at 100-120 grams per day or less.    3.  Practice some mindful eating strategies, chew food 20-30 times before swallowing, eat food slowly, eat separately from drinking-wait 30 minutes after eating before drinking .  Make the meals last 30 minutes.  4.  Aim for 64 oz per day of water.  (Try Protein 2 O water, adding True Lemon, Crystal Light, use a measured container).  5.  Taper caffeine and carbonation.  6.  Exercise with a goal of 30 minutes per day for exercise (for example,walking). (I burn 9.85 calories per minute of walking)  7.  Strength training 10 minutes 3 days per week. (Sit and Be Fit and Fit on YouTube) or (7 minute workout song on You Tube) or (Gym Rat no more-18 at home exercises)

## 2025-01-29 NOTE — PROGRESS NOTES
Chief Complaint   Patient presents with    Follow - Up     F/u on medication, needs to see if she should continue it or not         HPI:   Damari is 54 year old No obstetric history on file., here today to discuss the Estradiol she takes for hot flashes. She is unsure if she should continue to take it. She states since being on the Estradiol she doesn't have vasomotor symptoms or night sweats. She is not currently sexually active. She declines other symptoms of menopause such as brain fog, irritability, vaginal dryness or difficulty sleeping.       Patient Active Problem List   Diagnosis    Asthma (HCC)    Benign essential hypertension    Bipolar II disorder (HCC)    Discoid lupus    Gastroesophageal reflux disease    Generalized anxiety disorder    Morbid obesity (HCC)    Obstructive sleep apnea (adult) (pediatric)    Restless leg syndrome    Uterine leiomyoma    Inflammatory polyarthritis (HCC)    Rash    Glaucoma of left eye    Prediabetes    Hidradenitis suppurativa    Chronic superficial gastritis without bleeding    Hiatal hernia    Polyp of colon    Internal hemorrhage    Colon, diverticulosis        Note: This is a gyn only visit. Pt has PCP and is referred back to PCP for care of any non-gyn concerns listed above in the Problem List.    Medications (Active prior to today's visit):  Current Outpatient Medications   Medication Sig Dispense Refill    Meloxicam 15 MG Oral Tab Take 1 tablet (15 mg total) by mouth daily. 30 tablet 0    pantoprazole 40 MG Oral Tab EC Take 1 tablet (40 mg total) by mouth 2 (two) times daily before meals. 180 tablet 0    fluticasone propionate 50 MCG/ACT Nasal Suspension 2 sprays by Nasal route daily. 48 g 3    Acetaminophen ER (TYLENOL 8 HOUR ARTHRITIS PAIN) 650 MG Oral Tab CR Take 1 tablet (650 mg total) by mouth every 8 (eight) hours as needed for Pain.      Blood Pressure Monitor Does not apply Device Please monitor blood pressure once daily 1 each 0    losartan 100 MG Oral Tab  Take 1 tablet (100 mg total) by mouth daily. 90 tablet 0    predniSONE 5 MG Oral Tab Take 4 tablets (20 mg total) by mouth daily for 3 days, THEN 3 tablets (15 mg total) daily for 3 days, THEN 2 tablets (10 mg total) daily for 3 days, THEN 1 tablet (5 mg total) daily for 3 days, THEN 1 tablet (5 mg total) daily as needed. Take as instructed in the morning with breakfast for 12 days.  Then, after 12 days, only take prednisone up to 1 tablet daily as needed for breakthrough pain.. 60 tablet 0    amLODIPine 5 MG Oral Tab Take 1 tablet (5 mg total) by mouth daily. (Patient taking differently: Take 0.5 tablets (2.5 mg total) by mouth daily.) 90 tablet 3    CUSTOM MEDICATION Semaglutide/cyanocobalamin injection    Concentration: 5 mg/ 0.5 mL   Amount: 2.5 mL vial x2 (total of 5 mL) with supplies  Instructions: Inject 0.1 mL weekly subcutaneous 1 each 0    furosemide 20 MG Oral Tab Take 1-2 tablets (20-40 mg total) by mouth daily as needed. 90 tablet 0    CUSTOM MEDICATION Semaglutide/cyanocobalamin injection    Concentration: 1 mg/ 0.5 mL   Amount: 5 ML vial with supplies  Instructions: Inject 0.25 mL weekly (Patient taking differently: 2.5 mg once a week. Semaglutide/cyanocobalamin injection    Concentration: 1 mg/ 0.5 mL   Amount: 5 ML vial with supplies  Instructions: Inject 0.25 mL weekly    Every Monday) 1 each 0    COSENTYX UNOREADY 300 MG/2ML Subcutaneous Solution Auto-injector every 30 (thirty) days.      clindamycin 1 % External Lotion Apply twice daily with flares (Patient taking differently: as needed. Apply twice daily with flares) 60 mL 11    Benzoyl Peroxide (BENZOYL PEROXIDE WASH) 10 % External Liquid Use daily as wash in shower to affected areas (Patient taking differently: Use daily as wash in shower to affected areas    Not started. Currently using Cerave facial wash.) 227 g 3    estradiol 1 MG Oral Tab Take 1 tablet (1 mg total) by mouth daily. 90 tablet 1    hydroxychloroquine 200 MG Oral Tab Take 2  tablets (400 mg total) by mouth daily. 180 tablet 2    naproxen 500 MG Oral Tab Take 1 tablet (500 mg total) by mouth 2 (two) times daily as needed.      carvedilol 12.5 MG Oral Tab Take 1 tablet (12.5 mg total) by mouth 2 (two) times daily with meals.      latanoprost 0.005 % Ophthalmic Solution Place 1 drop into both eyes nightly.      spironolactone 25 MG Oral Tab Take 1 tablet (25 mg total) by mouth daily.      Calcium Citrate-Vitamin D 200-6.25 MG-MCG Oral Tab Take 1 tablet by mouth daily.      ferrous sulfate 325 (65 FE) MG Oral Tab EC Take 1 tablet (325 mg total) by mouth every other day.      Multiple Vitamins-Minerals (MULTI-VITAMIN/MINERALS) Oral Tab Take 1 tablet by mouth daily.      montelukast 10 MG Oral Tab Take 1 tablet (10 mg total) by mouth daily.         Allergies:  Allergies[1]    ROS:  Review of Systems   Constitutional:  Negative for diaphoresis, fatigue and unexpected weight change.   Respiratory: Negative.  Negative for apnea, cough, chest tightness and shortness of breath.    Cardiovascular: Negative.  Negative for chest pain and palpitations.   Genitourinary: Negative.    Neurological: Negative.  Negative for dizziness and headaches.   Psychiatric/Behavioral: Negative.       PHYSICAL EXAM:  Vitals:    01/29/25 1002   BP: 149/81   Pulse: 70     Physical Exam  Vitals reviewed.   Constitutional:       General: She is not in acute distress.     Appearance: Normal appearance. She is obese.   Cardiovascular:      Rate and Rhythm: Normal rate.   Pulmonary:      Effort: Pulmonary effort is normal.   Neurological:      Mental Status: She is alert and oriented to person, place, and time.   Psychiatric:         Mood and Affect: Mood normal.         Behavior: Behavior normal.         Thought Content: Thought content normal.         Judgment: Judgment normal.           ASSESSMENT/PLAN:     Damari was seen today for follow - up.    Diagnoses and all orders for this visit:    Vasomotor symptoms due to  menopause  -     Comp Metabolic Panel (14); Future    Benign essential hypertension      Next routine annual gyn exam due: July 2025  Follow-up/Return to clinic: as needed if she desires to restart treatment after tapering off HT    Counseling:   Considering her history of HTN it is advised that she discontinues the use of HT d/t the increase risk of cardiovascular events, but there are other options to treat vasomotor symptoms if she desires. We discussed the use of the following medications. At this time Daamri would like to stop taking the estradiol and not try another medication. CMP ordered should she decide to move forward with Veozah.     Her vasomotor symptoms may return with discontinuing the estradiol. She can taper off the estradiol starting by taking every other day for 1 week then 3x a week until she finishes the medication.  Discussed sleep hygiene practices.     -Antidepressants: Certain drugs approved to treat depression reduce hot flashes in women without depression.   Effective drugs include paroxetine (Paxil), venlafaxine (Effexor), and escitalopram (Lexapro).      -Veozah is not a hormone. It targets the neural activity which causes hot flashes during menopause.   The prescribing information for Veozah includes a warning for liver injury. Before using Veozah, patients should have blood work done to test for liver damage. While on Veozah, routine bloodwork should be performed every three months for the first nine months of using the medication.     Patient verbalized understanding, All questions answered. No barriers to learning identified    ELISHA Lares under direct supervision and in collaboration with Ela Mccabe CNM     Patient seen in conjunction with ELISHA. I personally witnessed the patient's exam and medical decision making on this date of service.  I was physically present in the room for the performance of the E/M service.  I have reviewed the FREDO student's documentation and  findings including history, Exam, and Medical Decision Making, edited the document for accuracy and verify that it represents the clinical findings and services performed.           [1] No Known Allergies

## 2025-01-29 NOTE — TELEPHONE ENCOUNTER
Verified name and .    Patient calling to follow up regarding MANUEL Davis's result note.    She states that she is agreeable to start cholesterol lowering medication and is requesting that prescription be sent to the [facility] on file.          Dear Damari,     Your LDL cholesterol is elevated and your 10-year risk of heart attack or stroke is elevated at 8.2%. This means you are a candidate for cholesterol lowering medication. Please let the office know if you are interested in this.  The rest of your test results were within normal limits. Please call the office if you have any questions or concerns.     Best regards,  MANUEL Warren  For Dr. Cookie Knet   Written by MANUEL Warren on 2025 11:37 AM CST  Seen by patient Damari Janett Waterman on 2025  3:57 PM

## 2025-01-29 NOTE — PROGRESS NOTES
INITIAL OUTPATIENT NUTRITION CONSULTATION        Nutrition Assessment    Medical Diagnosis: Obesity, Hypertension, Prediabetes, and GERD, chronic gastritis, hiatal hernia, ABAD  wears Cpap    Physical Findings: fatigue, joint pain, and gets 6 hours per night    Client Age and Gender: 54 year old female    Marital Status and Occupation: Lives with niece, 16 yr old.  Works as a - mostly sedentary work      Labs:   No components found for: \"HGBA1C\"  No results found for: \"TRIG\"  No results found for: \"LDL\"  No results found for: \"HDL\"  AST   Date Value Ref Range Status   03/17/2024 23 <=34 U/L Final     ALT   Date Value Ref Range Status   03/17/2024 13 10 - 49 U/L Final         Height:  Ht Readings from Last 1 Encounters:   01/09/25 5' 4\" (1.626 m)       Weight:   Wt Readings from Last 2 Encounters:   01/09/25 287 lb (130.2 kg)   12/19/24 299 lb (135.6 kg)       BMI Readings from Last 1 Encounters:   01/09/25 49.26 kg/m²       Weight change: Decrease of 2.9 lbs in the past month    Goal weight/Health status:    Diet/Weight History: Per pt with excess weight beginning as a child, \"I've always been overweight\".  Per pt s/p lap band 2015, s/p VSG 2017-had lots of depression and did not do well. Has tried ozempic and topiramate and Qsymia. Per pt has tried eating more vegetables.    Current Diet: calorie reduction    Food/Beverage Intake:     Breakfast: skips 3-4/7 days or cereal-honey nut cheerios or maximus puffs with 2% milk or pancake and sausage or FF [employer] croissant sandwich and coffee  Snack: skips  Lunch: noon-cabbage and chicken or salad chicken grilled and egg and sometimes cheese  Snack:skips  Dinner: 5-7 pm- Canes chicken fingers  Snack: skip or popcorn or chips  Beverages: Sweet Tea 72 oz and occasional soda, no water    Vitamins/mineral supplements:ferrous sulfate every other day, calcium citrate-vit D    Meal pattern: 2-3 meals/d, 1 snacks/d    Number of meals/week eaten at restaurants: 3-4  x/week        Alcohol Intake: no ozs/wk    Estimated current caloric intake: 3000 cals/d    Estimated caloric needs for weight loss: 3000-749=4944 cals/d for 1 pounds/week weight loss    Physical Activity: no hrs/week     Food Journal: no    Spent 39 minutes in consultation with the patient.      Nutrition Intervention/Education:  Comprehensive nutrition education and evaluation provided for weight loss. Met with pt for initial visit. Pt is being followed by Dr. Wilcox for medical weight management.  Pt is taking compound semaglutide (zepbound) and feels not too different taking it .  Per pt with excess weight beginning as a child, \"I've always been overweight\".  Per pt s/p lap band 2015, s/p VSG 2017-had lots of depression and did not do well. Has tried ozempic and topiramate and Qsymia. Per pt has tried eating more vegetables. Per diet recall, current diet is low in protein and produce.  Discussed benefits of including protein and produce with meals and snacks.  Provided guidelines for macro nutrients, ideas and recipe. Pt verbalized understanding. Per pt does not drink water per day, mostly sweet tea 72 oz per day and an occasional soda.  Discussed benefits of tapering caffeine and carbonation and increasing water per day.  Pt verbalized understanding.  Per pt is not exercising.  Discussed benefits of beginning in small increments.  Pt verbalized understanding.    Patient agreed to goals below.    Goals:     1.  Keep a food record, My Net Diary, select the macros dashboard.  2.  Strive to consume at least 4-6 meals/snacks per day.  Include protein and produce when you eat.  Aim  for 109-136 grams of protein per day.  Try to keep the carbohydrates at 100-120 grams per day or less.    3.  Practice some mindful eating strategies, chew food 20-30 times before swallowing, eat food slowly, eat separately from drinking-wait 30 minutes after eating before drinking .  Make the meals last 30 minutes.  4.  Aim for 64 oz  per day of water.  (Try Protein 2 O water, adding True Lemon, Crystal Light, use a measured container).  5.  Taper caffeine and carbonation.  6.  Exercise with a goal of 30 minutes per day for exercise (for example,walking). (I burn 9.85 calories per minute of walking)  7.  Strength training 10 minutes 3 days per week. (Sit and Be Fit and Fit on YouTube) or (7 minute workout song on You Tube) or (Gym Rat no more-18 at home exercises)     Monitoring/Evaluation:  Follow up appt scheduled with dietitian          Ele Syed RD, LDN

## 2025-01-30 ENCOUNTER — TELEPHONE (OUTPATIENT)
Dept: INTERNAL MEDICINE CLINIC | Facility: CLINIC | Age: 55
End: 2025-01-30

## 2025-01-30 DIAGNOSIS — I10 BENIGN ESSENTIAL HYPERTENSION: ICD-10-CM

## 2025-01-30 DIAGNOSIS — M25.472 LEFT ANKLE SWELLING: ICD-10-CM

## 2025-01-30 NOTE — TELEPHONE ENCOUNTER
Call to patient notifying her Cholesterol prescription-Rosuvastatin sent to her requested pharmacy. She verbalized understanding and states she already obtained. No further questions verbalized.

## 2025-02-03 NOTE — TELEPHONE ENCOUNTER
Please review; protocol failed/ has no protocol    Requested Prescriptions   Pending Prescriptions Disp Refills    FUROSEMIDE 20 MG Oral Tab [Pharmacy Med Name: FUROSEMIDE 20MG TABLETS] 180 tablet 0     Sig: TAKE 1 TO 2 TABLETS(20 TO 40 MG) BY MOUTH DAILY AS NEEDED       Hypertension Medications Protocol Failed - 2/3/2025  3:15 PM        Failed - Last BP reading less than 140/90     BP Readings from Last 1 Encounters:   01/29/25 149/81               Failed - Medication is active on med list        Passed - CMP or BMP in past 12 months        Passed - In person appointment or virtual visit in the past 12 mos or appointment in next 3 mos     Recent Outpatient Visits              5 days ago Morbid obesity (HCC) [E66.01]    [facility] [address]    Office Visit    5 days ago Vasomotor symptoms due to menopause    [facility] Ela Mccabe CNM    Office Visit    1 week ago Pain    [facility] Oliver Su MD    Office Visit    1 month ago Routine general medical examination at a health care facility    [facility] Susan Darnell APRN    Office Visit    1 month ago [facility]          Future Appointments         Provider Department Appt Notes    In 1 week Oliver Su MD [facility] *r/s from 02/05 1/28 sent mc to rs - Osteoarthritis of both knees, unspecified osteoarthritis type    In 3 weeks Mandy Wilcox MD [facility] [place]     In 1 month Isela Trimble DO [facility] 3 month f/u    In 2 months Ele [address] RD [facility]                     Passed - EGFRCR  or GFRAA > 50     GFR Evaluation  EGFRCR: 58 , resulted on 1/29/2025             Recent Outpatient Visits              5 days ago Morbid obesity (HCC) [E66.01]    [facility] [address]    Office Visit    5 days ago Vasomotor symptoms due to menopause    [facility] Ela Mccabe CNM    Office Visit    1 week ago Pain    [facility] Oliver Su MD    Office Visit    1 month ago Routine general medical examination at a health care facility    [facility] Susan Darnell APRN    Office Visit    1 month ago Discoid lupus    [facility] [place], [place], DO    Office Visit          Future Appointments         Provider Department Appt Notes    In 1 week Oliver Su MD [facility] *r/s from 02/05 1/28 sent mc to rs - Osteoarthritis of both knees, unspecified osteoarthritis type    In 3 weeks Mandy Wilcox MD [facility], [place], [place]     In 1 month Isela Trimble DO [facility] 3 month f/u    In 2 months Ele Syed RD [facility]

## 2025-02-05 RX ORDER — FUROSEMIDE 20 MG/1
TABLET ORAL
Qty: 180 TABLET | Refills: 0 | Status: SHIPPED | OUTPATIENT
Start: 2025-02-05

## 2025-02-05 NOTE — TELEPHONE ENCOUNTER
Please ask her to report recent BP readings over the past week as her BP was high at last few visits

## 2025-02-07 ENCOUNTER — OFFICE VISIT (OUTPATIENT)
Dept: INTERNAL MEDICINE CLINIC | Facility: CLINIC | Age: 55
End: 2025-02-07

## 2025-02-07 VITALS
HEART RATE: 76 BPM | DIASTOLIC BLOOD PRESSURE: 76 MMHG | HEIGHT: 61.8 IN | SYSTOLIC BLOOD PRESSURE: 132 MMHG | OXYGEN SATURATION: 99 % | WEIGHT: 293 LBS | BODY MASS INDEX: 53.92 KG/M2 | RESPIRATION RATE: 17 BRPM | TEMPERATURE: 98 F

## 2025-02-07 DIAGNOSIS — J01.01 ACUTE RECURRENT MAXILLARY SINUSITIS: Primary | ICD-10-CM

## 2025-02-07 DIAGNOSIS — J30.2 SEASONAL ALLERGIES: ICD-10-CM

## 2025-02-07 DIAGNOSIS — J34.89 SINUS PRESSURE: ICD-10-CM

## 2025-02-07 PROCEDURE — 3075F SYST BP GE 130 - 139MM HG: CPT | Performed by: INTERNAL MEDICINE

## 2025-02-07 PROCEDURE — 3008F BODY MASS INDEX DOCD: CPT | Performed by: INTERNAL MEDICINE

## 2025-02-07 PROCEDURE — 3078F DIAST BP <80 MM HG: CPT | Performed by: INTERNAL MEDICINE

## 2025-02-07 PROCEDURE — 99214 OFFICE O/P EST MOD 30 MIN: CPT | Performed by: INTERNAL MEDICINE

## 2025-02-07 RX ORDER — AMOXICILLIN 875 MG/1
875 TABLET, COATED ORAL 2 TIMES DAILY
Qty: 20 TABLET | Refills: 0 | Status: SHIPPED | OUTPATIENT
Start: 2025-02-07 | End: 2025-02-17

## 2025-02-07 RX ORDER — LORATADINE 10 MG/1
10 TABLET ORAL DAILY
Qty: 30 TABLET | Refills: 0 | Status: SHIPPED | OUTPATIENT
Start: 2025-02-07

## 2025-02-07 NOTE — TELEPHONE ENCOUNTER
She did not read Veronica message yet.  This RN called her, verified name/.  She does not have a blood pressure machine,  Plans to purchase one.  States has been feeling lightheaded the past week.  Denies headache but wonders if her blood pressure is running high.  Advised and scheduled appointment today to evaluate.  Future Appointments   Date Time Provider Department Center   2025  2:45 PM Mayo Morales MD [id number] [place] [id number]   2/10/2025  3:30 PM Oliver Su MD [facility]   2025  1:30 PM Mandy Wilcox MD [id number] [facility] [place]   3/26/2025 10:40 AM [facility]   2025 10:00 AM Ele Syed RD [id number] [facility]     Message routed to Susan JACKSON as FYI, please address refill request below, given there are no blood pressure readings.

## 2025-02-08 NOTE — PROGRESS NOTES
Subjective:     Patient ID: Damari Waterman is a 54 year old female.    patient comes in today with complaint of sinus pain pressure some headaches and lightheadedness due to it has history of recurrent sinusitis patient has seasonal allergies not taking anything for  Denies any palpitations        History/Other:   Review of Systems   Constitutional: Negative.    HENT:  Positive for congestion, sinus pressure and sinus pain.    Eyes: Negative.    Respiratory: Negative.     Cardiovascular: Negative.    Gastrointestinal: Negative.    Genitourinary: Negative.    Musculoskeletal: Negative.    Skin: Negative.    Neurological: Negative.    Psychiatric/Behavioral: Negative.       Current Outpatient Medications   Medication Sig Dispense Refill    amoxicillin 875 MG Oral Tab Take 1 tablet (875 mg total) by mouth 2 (two) times daily for 10 days. 20 tablet 0    loratadine 10 MG Oral Tab Take 1 tablet (10 mg total) by mouth daily. 30 tablet 0    furosemide 20 MG Oral Tab TAKE 1 TO 2 TABLETS(20 TO 40 MG) BY MOUTH DAILY AS NEEDED 180 tablet 0    rosuvastatin 10 MG Oral Tab Take 1 tablet (10 mg total) by mouth nightly. 90 tablet 3    Meloxicam 15 MG Oral Tab Take 1 tablet (15 mg total) by mouth daily. 30 tablet 0    pantoprazole 40 MG Oral Tab EC Take 1 tablet (40 mg total) by mouth 2 (two) times daily before meals. 180 tablet 0    fluticasone propionate 50 MCG/ACT Nasal Suspension 2 sprays by Nasal route daily. 48 g 3    Acetaminophen ER (TYLENOL 8 HOUR ARTHRITIS PAIN) 650 MG Oral Tab CR Take 1 tablet (650 mg total) by mouth every 8 (eight) hours as needed for Pain.      Blood Pressure Monitor Does not apply Device Please monitor blood pressure once daily 1 each 0    losartan 100 MG Oral Tab Take 1 tablet (100 mg total) by mouth daily. 90 tablet 0    amLODIPine 5 MG Oral Tab Take 1 tablet (5 mg total) by mouth daily. (Patient taking differently: Take 0.5 tablets (2.5 mg total) by mouth daily.) 90 tablet 3    CUSTOM MEDICATION  Semaglutide/cyanocobalamin injection    Concentration: 5 mg/ 0.5 mL   Amount: 2.5 mL vial x2 (total of 5 mL) with supplies  Instructions: Inject 0.1 mL weekly subcutaneous 1 each 0    CUSTOM MEDICATION Semaglutide/cyanocobalamin injection    Concentration: 1 mg/ 0.5 mL   Amount: 5 ML vial with supplies  Instructions: Inject 0.25 mL weekly (Patient taking differently: 2.5 mg once a week. Semaglutide/cyanocobalamin injection    Concentration: 1 mg/ 0.5 mL   Amount: 5 ML vial with supplies  Instructions: Inject 0.25 mL weekly    Every Monday) 1 each 0    COSENTYX UNOREADY 300 MG/2ML Subcutaneous Solution Auto-injector every 30 (thirty) days.      clindamycin 1 % External Lotion Apply twice daily with flares (Patient taking differently: as needed. Apply twice daily with flares) 60 mL 11    Benzoyl Peroxide (BENZOYL PEROXIDE WASH) 10 % External Liquid Use daily as wash in shower to affected areas (Patient taking differently: Use daily as wash in shower to affected areas    Not started. Currently using Cerave facial wash.) 227 g 3    hydroxychloroquine 200 MG Oral Tab Take 2 tablets (400 mg total) by mouth daily. 180 tablet 2    naproxen 500 MG Oral Tab Take 1 tablet (500 mg total) by mouth 2 (two) times daily as needed.      carvedilol 12.5 MG Oral Tab Take 1 tablet (12.5 mg total) by mouth 2 (two) times daily with meals.      latanoprost 0.005 % Ophthalmic Solution Place 1 drop into both eyes nightly.      spironolactone 25 MG Oral Tab Take 1 tablet (25 mg total) by mouth daily.      Calcium Citrate-Vitamin D 200-6.25 MG-MCG Oral Tab Take 1 tablet by mouth daily.      ferrous sulfate 325 (65 FE) MG Oral Tab EC Take 1 tablet (325 mg total) by mouth every other day.      Multiple Vitamins-Minerals (MULTI-VITAMIN/MINERALS) Oral Tab Take 1 tablet by mouth daily.      montelukast 10 MG Oral Tab Take 1 tablet (10 mg total) by mouth daily.      estradiol 1 MG Oral Tab Take 1 tablet (1 mg total) by mouth daily. (Patient not  taking: Reported on 2/7/2025) 90 tablet 1     Allergies:Allergies[1]    Past Medical History:    Acute bilateral low back pain    Anemia    Anesthesia complication    had difficulty waking up after gastric sleeve surgery in 2018    Asthma (HCC)    Bronchitis    Cataract    right eye    Essential hypertension    Glaucoma    left eye    High blood pressure    Hypokalemia    Interscapular pain    Last Assessment & Plan:    Formatting of this note might be different from the original.   Patient reports pain is a 2/10 today.      Lupus    Neck pain    Last Assessment & Plan:    Formatting of this note might be different from the original.   Patient reports pain is a 2/10 today.      Osteoarthritis    Prediabetes    Right otitis media    Sleep apnea    Slow transit constipation    Type 2 diabetes mellitus without complication (HCC)    Visual impairment    reading glasses      Past Surgical History:   Procedure Laterality Date    Colonoscopy N/A 01/15/2025    Natacha Kan MD    Colonoscopy N/A 1/15/2025    Procedure: COLONOSCOPY;  Surgeon: Natacha Kan MD;  Location: [facility] ENDOSCOPY    Egd N/A 01/15/2025    Natacha Kan MD    Lap gastric bypass/daniel-en-y      Total abdom hysterectomy        No family history on file.   Social History:   Social History     Socioeconomic History    Marital status: Single   Tobacco Use    Smoking status: Never     Passive exposure: Never    Smokeless tobacco: Never   Vaping Use    Vaping status: Never Used   Substance and Sexual Activity    Alcohol use: Never    Drug use: Never   Other Topics Concern    Reaction to local anesthetic No    Pt has a pacemaker No    Pt has a defibrillator No     Social Drivers of Health     Food Insecurity: Low Risk  (5/25/2023)    [facility]    Food Insecurity     Have there been times that your food ran out, and you didn't have money to get more?: No     Are there times that you worry  that this might happen?: No   Transportation Needs: Low Risk  (5/25/2023)    [facility]    Transportation Needs     Do you have trouble getting transportation to medical appointments?: No        Objective:   Physical Exam  Vitals and nursing note reviewed.   Constitutional:       Appearance: She is well-developed.   HENT:      Head: Normocephalic and atraumatic.      Right Ear: External ear normal.      Left Ear: External ear normal.      Nose: Nose normal.      Mouth/Throat:      Comments: Left sinus pain  Eyes:      Conjunctiva/sclera: Conjunctivae normal.      Pupils: Pupils are equal, round, and reactive to light.   Cardiovascular:      Rate and Rhythm: Normal rate and regular rhythm.      Heart sounds: Normal heart sounds.   Pulmonary:      Effort: Pulmonary effort is normal.      Breath sounds: Normal breath sounds.   Abdominal:      General: Bowel sounds are normal.      Palpations: Abdomen is soft.   Genitourinary:     Vagina: Normal.   Musculoskeletal:         General: Normal range of motion.      Cervical back: Normal range of motion and neck supple.   Skin:     General: Skin is warm and dry.   Neurological:      Mental Status: She is alert and oriented to person, place, and time.      Deep Tendon Reflexes: Reflexes are normal and symmetric.   Psychiatric:         Behavior: Behavior normal.         Thought Content: Thought content normal.         Judgment: Judgment normal.         Assessment & Plan:   1. Acute recurrent maxillary sinusitis will cover with antibiotic take prescribed let us know if not better   2. Sinus pressure as above as needed saline nasal spray   3. Seasonal allergies take antihistamine let us know if not better       No orders of the defined types were placed in this encounter.      Meds This Visit:  Requested Prescriptions     Signed Prescriptions Disp Refills    amoxicillin 875 MG Oral Tab 20 tablet 0     Sig: Take  1 tablet (875 mg total) by mouth 2 (two) times daily for 10 days.    loratadine 10 MG Oral Tab 30 tablet 0     Sig: Take 1 tablet (10 mg total) by mouth daily.       Imaging & Referrals:  None            [1] No Known Allergies

## 2025-02-20 ENCOUNTER — NURSE ONLY (OUTPATIENT)
Dept: INTERNAL MEDICINE CLINIC | Facility: CLINIC | Age: 55
End: 2025-02-20

## 2025-02-20 VITALS — HEART RATE: 88 BPM | SYSTOLIC BLOOD PRESSURE: 124 MMHG | DIASTOLIC BLOOD PRESSURE: 75 MMHG

## 2025-02-20 DIAGNOSIS — I10 BENIGN ESSENTIAL HYPERTENSION: Primary | ICD-10-CM

## 2025-02-20 RX ORDER — CARVEDILOL 12.5 MG/1
12.5 TABLET ORAL 2 TIMES DAILY WITH MEALS
Qty: 180 TABLET | Refills: 0 | Status: SHIPPED | OUTPATIENT
Start: 2025-02-20

## 2025-02-20 NOTE — PROGRESS NOTES
Patient came in for nurse visit. I verified orders/. Pt is here for b/p check, I checked b/p on left arm, reading was 124/75 with pulse of 88. Pt rechecked b/p on her machine, it was 158/83. Pt stts she does not want to keep checking at home if readings are always running high. Pt was advised I will let her provider know.

## 2025-02-27 ENCOUNTER — OFFICE VISIT (OUTPATIENT)
Dept: DERMATOLOGY CLINIC | Facility: CLINIC | Age: 55
End: 2025-02-27

## 2025-02-27 DIAGNOSIS — L73.2 HIDRADENITIS SUPPURATIVA: Primary | ICD-10-CM

## 2025-02-27 PROCEDURE — 99214 OFFICE O/P EST MOD 30 MIN: CPT | Performed by: STUDENT IN AN ORGANIZED HEALTH CARE EDUCATION/TRAINING PROGRAM

## 2025-02-27 NOTE — PROGRESS NOTES
Referred by: Susan JACKSON    CHIEF COMPLAINT: boils pt with hx discoid lupus    HISTORY OF PRESENT ILLNESS: .    1. boils  Location: axillae, groin, inner thighs, chest, abdomen   Duration: chronic  Signs and symptoms: Pt reports improvement  Current treatment: Cosentyx  Past treatments: none        DERM HISTORY:  History of skin cancer: No  History of chronic skin disease/condition: Yes    FAMILY HISTORY:  History of melanoma: No  History of chronic skin disease/condition: No    History/Other:    REVIEW OF SYSTEMS:  Constitutional: Denies fever, chills, unintentional weight loss.   Skin as per HPI    PAST MEDICAL HISTORY:  Past Medical History:    Acute bilateral low back pain    Anemia    Anesthesia complication    had difficulty waking up after gastric sleeve surgery in 2018    Asthma (HCC)    Bronchitis    Cataract    right eye    Essential hypertension    Glaucoma    left eye    High blood pressure    Hypokalemia    Interscapular pain    Last Assessment & Plan:    Formatting of this note might be different from the original.   Patient reports pain is a 2/10 today.      Lupus    Neck pain    Last Assessment & Plan:    Formatting of this note might be different from the original.   Patient reports pain is a 2/10 today.      Osteoarthritis    Prediabetes    Right otitis media    Sleep apnea    Slow transit constipation    Type 2 diabetes mellitus without complication (HCC)    Visual impairment    reading glasses       Medications  Current Outpatient Medications   Medication Sig Dispense Refill    carvedilol 12.5 MG Oral Tab Take 1 tablet (12.5 mg total) by mouth 2 (two) times daily with meals. 180 tablet 0    loratadine 10 MG Oral Tab Take 1 tablet (10 mg total) by mouth daily. 30 tablet 0    furosemide 20 MG Oral Tab TAKE 1 TO 2 TABLETS(20 TO 40 MG) BY MOUTH DAILY AS NEEDED 180 tablet 0    rosuvastatin 10 MG Oral Tab Take 1 tablet (10 mg total) by mouth nightly. 90 tablet 3    Meloxicam 15 MG Oral Tab  Take 1 tablet (15 mg total) by mouth daily. 30 tablet 0    pantoprazole 40 MG Oral Tab EC Take 1 tablet (40 mg total) by mouth 2 (two) times daily before meals. 180 tablet 0    fluticasone propionate 50 MCG/ACT Nasal Suspension 2 sprays by Nasal route daily. 48 g 3    Acetaminophen ER (TYLENOL 8 HOUR ARTHRITIS PAIN) 650 MG Oral Tab CR Take 1 tablet (650 mg total) by mouth every 8 (eight) hours as needed for Pain.      Blood Pressure Monitor Does not apply Device Please monitor blood pressure once daily 1 each 0    losartan 100 MG Oral Tab Take 1 tablet (100 mg total) by mouth daily. 90 tablet 0    amLODIPine 5 MG Oral Tab Take 1 tablet (5 mg total) by mouth daily. (Patient taking differently: Take 0.5 tablets (2.5 mg total) by mouth daily.) 90 tablet 3    CUSTOM MEDICATION Semaglutide/cyanocobalamin injection    Concentration: 5 mg/ 0.5 mL   Amount: 2.5 mL vial x2 (total of 5 mL) with supplies  Instructions: Inject 0.1 mL weekly subcutaneous 1 each 0    CUSTOM MEDICATION Semaglutide/cyanocobalamin injection    Concentration: 1 mg/ 0.5 mL   Amount: 5 ML vial with supplies  Instructions: Inject 0.25 mL weekly (Patient taking differently: 2.5 mg once a week. Semaglutide/cyanocobalamin injection    Concentration: 1 mg/ 0.5 mL   Amount: 5 ML vial with supplies  Instructions: Inject 0.25 mL weekly    Every Monday) 1 each 0    COSENTYX UNOREADY 300 MG/2ML Subcutaneous Solution Auto-injector every 30 (thirty) days.      clindamycin 1 % External Lotion Apply twice daily with flares (Patient taking differently: as needed. Apply twice daily with flares) 60 mL 11    Benzoyl Peroxide (BENZOYL PEROXIDE WASH) 10 % External Liquid Use daily as wash in shower to affected areas (Patient taking differently: Use daily as wash in shower to affected areas    Not started. Currently using Cerave facial wash.) 227 g 3    hydroxychloroquine 200 MG Oral Tab Take 2 tablets (400 mg total) by mouth daily. 180 tablet 2    naproxen 500 MG Oral Tab  Take 1 tablet (500 mg total) by mouth 2 (two) times daily as needed.      latanoprost 0.005 % Ophthalmic Solution Place 1 drop into both eyes nightly.      spironolactone 25 MG Oral Tab Take 1 tablet (25 mg total) by mouth daily.      Calcium Citrate-Vitamin D 200-6.25 MG-MCG Oral Tab Take 1 tablet by mouth daily.      ferrous sulfate 325 (65 FE) MG Oral Tab EC Take 1 tablet (325 mg total) by mouth every other day.      Multiple Vitamins-Minerals (MULTI-VITAMIN/MINERALS) Oral Tab Take 1 tablet by mouth daily.      montelukast 10 MG Oral Tab Take 1 tablet (10 mg total) by mouth daily.         Objective:    PHYSICAL EXAM:  General: awake, alert, no acute distress  Skin: Skin exam was performed today including the following: inner thighs and axillae. Pertinent findings include:   - with skin colored nodules, tender to touch    ASSESSMENT & PLAN:  Pathophysiology of diagnoses discussed with patient.  Therapeutic options reviewed. Risks, benefits, and alternatives discussed with patient. Instructions reviewed at length.    #Hidradenitis Suppurativa - given extent and severity recommended Cosentyx today. Alvarez stage 3  - Apply clindamycin 1% lotion twice daily to affected areas with flares. Use benzoyl peroxide wash once daily while using this medication. Purchase an over the counter acne wash containing 3-6% benzoyl peroxide. (Examples: Cerave Acne Wash, Cerave Acne Foaming Cream Cleanser, PanOxyl). If you have difficulty finding one, ask your pharmacist for assistance. Use to affected areas. Be sure to rinse thoroughly, as medication may bleach clothing, towels and hair.  - Continue cosentyx every 4 weeks. Risks, benefits, and alternatives discussed with patient.    High Risk Medication Monitoring: Cosentyx  - TB serology -will repeat annually  - Flu shot yearly  - No live vaccines  - Patient to hold dose if ill     #Discoid Lupus  #Inflammatory arthritis   - Managed by rheumatology and currently on plaquenil and  prednisone  - Ok for [place] in future    Return to clinic: 3 months or sooner if something concerning arises     Edvin Pearson MD

## 2025-03-06 ENCOUNTER — OFFICE VISIT (OUTPATIENT)
Facility: CLINIC | Age: 55
End: 2025-03-06

## 2025-03-06 VITALS
BODY MASS INDEX: 52.81 KG/M2 | DIASTOLIC BLOOD PRESSURE: 74 MMHG | WEIGHT: 287 LBS | SYSTOLIC BLOOD PRESSURE: 122 MMHG | HEIGHT: 61.8 IN

## 2025-03-06 DIAGNOSIS — R60.0 BILATERAL LOWER EXTREMITY EDEMA: Primary | ICD-10-CM

## 2025-03-06 NOTE — PATIENT INSTRUCTIONS
Labs ordered per protocol to [address]. [employer]PRESS[employer] VENDORS:  The list below are different vendors who have compression stockings available  Please call and make an appointment  Some of the following fitted clinics accept insurance or Self Pay:                 Patients with HMO insurance needs one primary physician referral      [facility]                                          5.   [facility]                                       SELF PAY = $106-$170                                                                       SELF PAY (price varies)                        [facility]                                                                                 [address]                        [place], [place]         [facility]                                                                                             Phone # [phone] [address]                                                          Fax # [phone]                        [place], [place]                                                                                               Phone # [phone]                                                                                 Fax # [phone]              [employer]                                                  6.       [facility]   SELF PAY = $85-$150                                                                          SELF PAY (price varies)                        Some Insurance Plans over $200                                                 [address]                                                               Tel# [phone]                         Phone # [phone]                                                                     Fax # [phone]                         [address]                                                                                                [place], [place]                                                                                   [employer]                         Phone # [phone]                                                                     [place]                         Fax#[phone]                                                                              [employer]                                                                                                                                                                                                                                                                                     Medicare A-D    [facility]                                               7.  [facility]        SELF PAY = $32.99-$120                                           SELF PAY = [address] [facility]        No insurance plans accepted                                                        [place] [address]        [address] [facility]                                                                                  Phone # [phone]        [place], [place]                                                                                Fax # [phone]        Phone # [phone]; Fax # [phone]         Technician: [facility]        SELF PAY = $50-$156        No insurance plans accepted        No Custom compressions        2 pairs every 6 months (Lymphedema)                       [address] [place], [place]         Phone # [phone] Ext # 5; Fax # [phone]        Technician: Dixie

## 2025-03-06 NOTE — PROGRESS NOTES
VASCULAR SURGERY CONSULT NOTE      Damari Waterman   :  1970  MR#  [id number]    REFERRING PHYSICIAN:  Susan Darnell  PRIMARY CARE PHYSICIAN:  No primary care provider on file.    Chief Complaint   Patient presents with    Consult     Patient is c/o edema on bilateral leg and sometimes is painful most left leg x 4 years  No compressions use  No DM II       HPI:    The patient is a 54 year old female who has been referred to the clinic today for an evaluation of her bilateral lower extremity edema. She reports the edema of her lower limbs has gradually worsened for the past four years and is associated with pain and heaviness. She denies any cardiac or kidney disorders. She takes diuretics as needed with partial improvement of the symptoms. She has not worn compression stockings in the recent past.  She additionally endorses a medical history of diskoid lupus, inflammatory polyarthritis, and morbid obesity.     PAST MEDICAL HISTORY:     Past Medical History:    Acute bilateral low back pain    Anemia    Anesthesia complication    had difficulty waking up after gastric sleeve surgery in 2018    Asthma (HCC)    Bronchitis    Cataract    right eye    Essential hypertension    Glaucoma    left eye    High blood pressure    Hypokalemia    Interscapular pain    Last Assessment & Plan:    Formatting of this note might be different from the original.   Patient reports pain is a 2/10 today.      Lupus    Neck pain    Last Assessment & Plan:    Formatting of this note might be different from the original.   Patient reports pain is a 2/10 today.      Osteoarthritis    Prediabetes    Right otitis media    Sleep apnea    Slow transit constipation    Type 2 diabetes mellitus without complication (HCC)    Visual impairment    reading glasses       PAST SURGICAL HISTORY:     Past Surgical History:   Procedure Laterality Date    Colonoscopy N/A 01/15/2025    Natacha Kan MD    Colonoscopy N/A 1/15/2025    Procedure:  COLONOSCOPY;  Surgeon: Natacha Kan MD;  Location: [facility] ENDOSCOPY    Egd N/A 01/15/2025    Natacha Kan MD    Lap gastric bypass/daniel-en-y      Total abdom hysterectomy          MEDICATIONS:     Current Outpatient Medications   Medication Sig Dispense Refill    carvedilol 12.5 MG Oral Tab Take 1 tablet (12.5 mg total) by mouth 2 (two) times daily with meals. 180 tablet 0    loratadine 10 MG Oral Tab Take 1 tablet (10 mg total) by mouth daily. 30 tablet 0    furosemide 20 MG Oral Tab TAKE 1 TO 2 TABLETS(20 TO 40 MG) BY MOUTH DAILY AS NEEDED 180 tablet 0    rosuvastatin 10 MG Oral Tab Take 1 tablet (10 mg total) by mouth nightly. 90 tablet 3    Meloxicam 15 MG Oral Tab Take 1 tablet (15 mg total) by mouth daily. 30 tablet 0    pantoprazole 40 MG Oral Tab EC Take 1 tablet (40 mg total) by mouth 2 (two) times daily before meals. 180 tablet 0    fluticasone propionate 50 MCG/ACT Nasal Suspension 2 sprays by Nasal route daily. 48 g 3    Acetaminophen ER (TYLENOL 8 HOUR ARTHRITIS PAIN) 650 MG Oral Tab CR Take 1 tablet (650 mg total) by mouth every 8 (eight) hours as needed for Pain.      Blood Pressure Monitor Does not apply Device Please monitor blood pressure once daily 1 each 0    losartan 100 MG Oral Tab Take 1 tablet (100 mg total) by mouth daily. 90 tablet 0    amLODIPine 5 MG Oral Tab Take 1 tablet (5 mg total) by mouth daily. (Patient taking differently: Take 0.5 tablets (2.5 mg total) by mouth daily.) 90 tablet 3    CUSTOM MEDICATION Semaglutide/cyanocobalamin injection    Concentration: 5 mg/ 0.5 mL   Amount: 2.5 mL vial x2 (total of 5 mL) with supplies  Instructions: Inject 0.1 mL weekly subcutaneous 1 each 0    CUSTOM MEDICATION Semaglutide/cyanocobalamin injection    Concentration: 1 mg/ 0.5 mL   Amount: 5 ML vial with supplies  Instructions: Inject 0.25 mL weekly (Patient taking differently: 2.5 mg once a week. Semaglutide/cyanocobalamin injection    Concentration: 1 mg/ 0.5 mL   Amount: 5 ML vial with  supplies  Instructions: Inject 0.25 mL weekly    Every Monday) 1 each 0    COSENTYX UNOREADY 300 MG/2ML Subcutaneous Solution Auto-injector every 30 (thirty) days.      clindamycin 1 % External Lotion Apply twice daily with flares (Patient taking differently: as needed. Apply twice daily with flares) 60 mL 11    Benzoyl Peroxide (BENZOYL PEROXIDE WASH) 10 % External Liquid Use daily as wash in shower to affected areas (Patient taking differently: Use daily as wash in shower to affected areas    Not started. Currently using Cerave facial wash.) 227 g 3    hydroxychloroquine 200 MG Oral Tab Take 2 tablets (400 mg total) by mouth daily. 180 tablet 2    naproxen 500 MG Oral Tab Take 1 tablet (500 mg total) by mouth 2 (two) times daily as needed.      latanoprost 0.005 % Ophthalmic Solution Place 1 drop into both eyes nightly.      spironolactone 25 MG Oral Tab Take 1 tablet (25 mg total) by mouth daily.      Calcium Citrate-Vitamin D 200-6.25 MG-MCG Oral Tab Take 1 tablet by mouth daily.      ferrous sulfate 325 (65 FE) MG Oral Tab EC Take 1 tablet (325 mg total) by mouth every other day.      Multiple Vitamins-Minerals (MULTI-VITAMIN/MINERALS) Oral Tab Take 1 tablet by mouth daily.      montelukast 10 MG Oral Tab Take 1 tablet (10 mg total) by mouth daily.         ALLERGIES:   Allergies[1]    SOCIAL HISTORY:     Social History     Socioeconomic History    Marital status: Single   Tobacco Use    Smoking status: Never     Passive exposure: Never    Smokeless tobacco: Never   Vaping Use    Vaping status: Never Used   Substance and Sexual Activity    Alcohol use: Never    Drug use: Never   Other Topics Concern    Reaction to local anesthetic No    Pt has a pacemaker No    Pt has a defibrillator No     Social Drivers of Health     Food Insecurity: Low Risk  (5/25/2023)    [facility]    Food Insecurity     Have there been times that your food ran out, and  you didn't have money to get more?: No     Are there times that you worry that this might happen?: No   Transportation Needs: Low Risk  (5/25/2023)    [facility]    Transportation Needs     Do you have trouble getting transportation to medical appointments?: No        FAMILY HISTORY:   No family history on file.    ROS:   A 12 point review of systems with pertinent positives and negatives listed in the HPI.    PHYSICAL EXAM:   Ht 5' 1.8\" (1.57 m)   Wt 287 lb (130.2 kg)   BMI 52.83 kg/m²   GENERAL: alert and orientated X 3, well developed, well nourished, in no apparent distress  HEENT: ears and throat are clear  NECK: supple, no lymphadenopathy, thyroid wnl  CAROTID: No bruits  RESPIRATORY: no rales, rhonchi, or wheezes B  CARDIO: RRR without murmur, no murmur, no gallop   ABDOMEN: soft, non-tender with no palpable aneurysm or masses  BACK: normal, no tenderness  SKIN: no rashes, warm and dry  NEURO/PSYCH: orientated x3, normal mood and affect, no sensory or motor deficit  EXTREMITIES: full range of motion, no tenderness or edema in either leg.   VASCULAR  Pulse exam right: femoral 2+, DP  2+, PT  2+  Pulse exam left: femoral  2+, DP  2+, PT  2+      Vein Assessment:      Mild scattered bilateral  LE reticular veins with no significant hemosiderin deposition.    IMPRESSION:   Bilateral lower extremity edema.      PLAN:     The patient was educated in the benign condition of venous disease.  I have given the patient a prescription for Grade III compression stockings (30-40 mmHg, waist-highs). We reviewed the importance of wearing these daily and consistently. We also reviewed other types of conservative measures, such as periodic leg elevation, walking for exercise, analgesic use, attempts at weight loss, and the avoidance of prolonged standing.  I have sent the patient for a venous reflux ultrasound to rule out venous insufficiency. Should the  ultrasound study reveal venous reflux with dilation and she does not have relief of symptoms with conservative therapy, then endovenous laser ablation may be a possible treatment option.    I explained to the patient that a conservative approach would be best since all of her  symptoms seem to have an strong inflammatory nature.  The patient understood and agreed to proceed with this treatment plan, all of her questions were answered during this clinic visit.       Thank you for allowing to participate in the care of your patient.     ETHAN Frederick  Division of Vascular Surgery.        [1] No Known Allergies

## 2025-03-07 ENCOUNTER — TELEPHONE (OUTPATIENT)
Dept: RHEUMATOLOGY | Facility: CLINIC | Age: 55
End: 2025-03-07

## 2025-03-07 RX ORDER — PREDNISONE 5 MG/1
TABLET ORAL
Qty: 60 TABLET | Refills: 0 | Status: SHIPPED | OUTPATIENT
Start: 2025-03-07 | End: 2025-04-18

## 2025-03-07 RX ORDER — LORATADINE 10 MG/1
10 TABLET ORAL DAILY
Qty: 90 TABLET | Refills: 3 | Status: SHIPPED | OUTPATIENT
Start: 2025-03-07

## 2025-03-07 NOTE — TELEPHONE ENCOUNTER
Returned call to patient verified name and .    Patient has a lupus flare for last 3 days. Taking the 5mg of prednisone but not helping much. Joint pain 7/10 and goes up to 10/10 at times.     Patient is requesting prednisone that is tapered down and advised her jpints are hurting \"really bad\".

## 2025-03-07 NOTE — TELEPHONE ENCOUNTER
Refill passed per [facility] protocol.  Requested Prescriptions   Pending Prescriptions Disp Refills    LORATADINE 10 MG Oral Tab [Pharmacy Med Name: ALLERGY RELF (LORATADINE) 10MG TABS] 30 tablet 0     Sig: TAKE 1 TABLET BY MOUTH DAILY       Allergy Medication Protocol Passed - 3/7/2025  5:41 AM        Passed - In person appointment or virtual visit in the past 12 mos or appointment in next 3 mos     Recent Outpatient Visits              Yesterday Bilateral lower extremity edema    [facility] Yin Frances APRN    Office Visit    1 week ago Hidradenitis suppurativa    [facility] Edvin Pearson MD    Office Visit    2 weeks ago Benign essential hypertension    [facility]    Nurse Only    4 weeks ago Acute recurrent maxillary sinusitis    [facility] Mayo Morales MD    Office Visit    1 month ago Morbid obesity (HCC) [E66.01]    [facility] [address]    Office Visit          Future Appointments         Provider Department Appt Notes    In 2 weeks Mandy Wilcox MD [facility]     In 2 weeks Isela Trimble DO [facility] 3 month f/u    In 3 weeks [facility] VASCULAR SURGERY LAB [facility]     In 1 month [address] [facility]     In 5 months Edvin Pearson MD [facility] follow up 6m                    Passed - Medication is active on med list

## 2025-03-07 NOTE — TELEPHONE ENCOUNTER
Patient called to advise she is having a lupus flare up and will like to know if she can get a refill on her prednisone.     Patient is requesting prednisone that is tapered down and advised her jpints are hurting \"really bad\"    Derrick on file verified.

## 2025-03-12 RX ORDER — HYDROXYCHLOROQUINE SULFATE 200 MG/1
400 TABLET, FILM COATED ORAL DAILY
Qty: 180 TABLET | Refills: 2 | Status: SHIPPED | OUTPATIENT
Start: 2025-03-12

## 2025-03-12 RX ORDER — HYDROXYCHLOROQUINE SULFATE 200 MG/1
400 TABLET, FILM COATED ORAL DAILY
Qty: 180 TABLET | Refills: 2 | OUTPATIENT
Start: 2025-03-12

## 2025-03-12 NOTE — TELEPHONE ENCOUNTER
LOV: 12/24/24  Future Appointments   Date Time Provider Department Center   3/24/2025  2:00 PM Mandy Wilcox MD [id number] [place]   3/26/2025 10:40 AM Isela Trimble DO [facility]   3/31/2025  1:00 PM EE VASCULAR SURGERY LAB [id number]   4/30/2025 10:00 AM [address] [id number] [place] [facility]   8/27/2025  9:15 [facility]     RHEUMATOLOGY CLINIC- FOLLOW UP     Damari Waterman is a 54 year old female.     ASSESSMENT/PLAN:          ICD-10-CM     1. 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 Inflammatory polyarthritis (HCC)  M06.4         3. Rash  R21         4. Hidradenitis suppurativa  L73.2            DISCUSSION:  Patient presents for follow-up of inflammatory polyarthritis in the setting of history of discoid lupus, formally following with [facility] rheumatology for discoid lupus (per patient, biopsy-proven since childhood).  Subsequent lab workup notable for only low positive SHARAD with negative dsDNA and normal complements.  Normal RF/CCP.     Patient's symptoms are corticosteroid responsive, however, she is concerned about the subsequent swelling and would like to limit prednisone use.  She does have synovitis on current exam consistent with an underlying inflammatory arthropathy with suspicions including: Seronegative RA versus SLE associated inflammatory arthritis versus hidradenitis associated inflammatory arthropathy.  Treatment is complicated by her comorbidities but agree with dermatology regarding Cosentyx and discussed with patient that this would hopefully treat both her hidradenitis suppurativa as well as her inflammatory arthritis.  Would avoid TNF inhibitors going forward given discoid lupus.  Lastly, continues on hydroxychloroquine and reports recently normal eye exam.  Will reach out to ophthalmology.     PLAN:  - S/P Cosentyx loading doses. First Cosentyx qm dose Jan 2025 per Dermatology for severe HS.  -Continue hydroxychloroquine 400 mg  daily (less than 5 mg/kg).  Discussed with patient can divide up doses and to 200 mg twice daily if GI upset.       -Previously discussed with patient side effects of Plaquenil which include but are not limited to retinopathy skin changes, blood abnormalities, hepatotoxicity, cardiotoxicity, neuro changes such as dizziness, fatigue, irritability, myopathy.         -Notes recent HCQ-Eye Exam with Dr. Tal Ribeiro. Will reach out.   -P.o. prednisone taper x 12 days.  Afterwards, patient can continue 5 mg daily with breakfast as needed for breakthrough pain.  - Consult/evaluation communicated with referring physician/provider.       -Derm: Dr. Pearson       -Opthm: Dr. Tal Ribeiro      Patient to follow-up in      Isela Trimble DO  12/24/2024   10:51 AM

## 2025-03-26 DIAGNOSIS — R23.2 HOT FLASHES: Primary | ICD-10-CM

## 2025-03-26 RX ORDER — FEZOLINETANT 45 MG/1
1 TABLET, FILM COATED ORAL DAILY
Qty: 30 TABLET | Refills: 3 | Status: SHIPPED | OUTPATIENT
Start: 2025-03-26

## 2025-03-31 ENCOUNTER — TELEPHONE (OUTPATIENT)
Facility: CLINIC | Age: 55
End: 2025-03-31

## 2025-03-31 RX ORDER — LOSARTAN POTASSIUM 100 MG/1
100 TABLET ORAL DAILY
Qty: 90 TABLET | Refills: 0 | Status: SHIPPED | OUTPATIENT
Start: 2025-03-31

## 2025-03-31 NOTE — TELEPHONE ENCOUNTER
Refill Per Protocol     Requested Prescriptions   Pending Prescriptions Disp Refills    LOSARTAN 100 MG Oral Tab [Pharmacy Med Name: LOSARTAN 100MG TABLETS] 90 tablet 0     Sig: TAKE 1 TABLET(100 MG) BY MOUTH DAILY       Hypertension Medications Protocol Passed - 3/31/2025  1:59 PM        Passed - CMP or BMP in past 12 months        Passed - Last BP reading less than 140/90     BP Readings from Last 1 Encounters:   03/06/25 122/74               Passed - In person appointment or virtual visit in the past 12 mos or appointment in next 3 mos     Recent Outpatient Visits              3 weeks ago Bilateral lower extremity edema    [facility] Yin Frances APRN    Office Visit    1 month ago Hidradenitis suppurativa    [facility] Edvin Pearson MD    Office Visit    1 month ago Benign essential hypertension    [facility]    Nurse Only    1 month ago Acute recurrent maxillary sinusitis    [facility] Mayo Morales MD    Office Visit    2 months ago Morbid obesity (HCC) [E66.01]    [facility] [address]    Office Visit          Future Appointments         Provider Department Appt Notes    In 2 days Isela Trimble DO [facility] 3 month f/u    In 1 month [address] [facility]     In 4 months Edvin Pearson MD [facility] follow up 6m                    Passed - EGFRCR or GFRAA > 50     GFR Evaluation  EGFRCR: 58 , resulted on 1/29/2025          Passed - Medication is active on med list

## 2025-03-31 NOTE — TELEPHONE ENCOUNTER
Please review pended refill request as unable to refill due to high/very high interaction warning copied here:    High  Drug-Drug: spironolactone and losartanThe risk of hyperkalemia may be increased when potassium-sparing diuretics are co-administered with angiotensin II receptor antagonists.

## 2025-04-01 ENCOUNTER — PATIENT MESSAGE (OUTPATIENT)
Dept: INTERNAL MEDICINE CLINIC | Facility: CLINIC | Age: 55
End: 2025-04-01

## 2025-04-01 DIAGNOSIS — Z12.31 SCREENING MAMMOGRAM FOR BREAST CANCER: Primary | ICD-10-CM

## 2025-04-01 NOTE — TELEPHONE ENCOUNTER
Dr. Morales,    Patient is requesting order for mammogram.    Last office visit: 02-  Last mammogram: 03- (per patient, no record of mammogram on file)    Orders pended, please review and sign if appropriate.    Thank you,  Sujatha HARDING MA

## 2025-04-09 ENCOUNTER — OFFICE VISIT (OUTPATIENT)
Age: 55
End: 2025-04-09
Payer: COMMERCIAL

## 2025-04-09 VITALS
SYSTOLIC BLOOD PRESSURE: 140 MMHG | HEIGHT: 61 IN | HEART RATE: 91 BPM | WEIGHT: 293 LBS | DIASTOLIC BLOOD PRESSURE: 80 MMHG | BODY MASS INDEX: 55.32 KG/M2

## 2025-04-09 DIAGNOSIS — R21 RASH: ICD-10-CM

## 2025-04-09 DIAGNOSIS — Z79.899 ENCOUNTER FOR LONG-TERM (CURRENT) USE OF HIGH-RISK MEDICATION: ICD-10-CM

## 2025-04-09 DIAGNOSIS — M06.4 INFLAMMATORY POLYARTHRITIS (HCC): ICD-10-CM

## 2025-04-09 DIAGNOSIS — M25.512 ACUTE PAIN OF LEFT SHOULDER: ICD-10-CM

## 2025-04-09 DIAGNOSIS — L93.0 DISCOID LUPUS: Primary | ICD-10-CM

## 2025-04-09 DIAGNOSIS — L73.2 HIDRADENITIS SUPPURATIVA: ICD-10-CM

## 2025-04-09 PROCEDURE — 20610 DRAIN/INJ JOINT/BURSA W/O US: CPT | Performed by: INTERNAL MEDICINE

## 2025-04-09 PROCEDURE — 3077F SYST BP >= 140 MM HG: CPT | Performed by: INTERNAL MEDICINE

## 2025-04-09 PROCEDURE — 3008F BODY MASS INDEX DOCD: CPT | Performed by: INTERNAL MEDICINE

## 2025-04-09 PROCEDURE — 3079F DIAST BP 80-89 MM HG: CPT | Performed by: INTERNAL MEDICINE

## 2025-04-09 PROCEDURE — 99215 OFFICE O/P EST HI 40 MIN: CPT | Performed by: INTERNAL MEDICINE

## 2025-04-09 RX ORDER — TRIAMCINOLONE ACETONIDE 40 MG/ML
40 INJECTION, SUSPENSION INTRA-ARTICULAR; INTRAMUSCULAR ONCE
Status: COMPLETED | OUTPATIENT
Start: 2025-04-09 | End: 2025-04-09

## 2025-04-09 NOTE — PROGRESS NOTES
RHEUMATOLOGY CLINIC- FOLLOW UP    Damari Waterman is a 55 year old female.    ASSESSMENT/PLAN:       ICD-10-CM    1. 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 Inflammatory polyarthritis (HCC)  M06.4       3. Hidradenitis suppurativa  L73.2       4. Rash  R21       5. Acute pain of left shoulder  M25.512 DRAIN/INJECT LARGE JOINT/BURSA     triamcinolone acetonide (Kenalog-40) 40 MG/ML injection 40 mg            DISCUSSION:  Patient presents for follow-up of inflammatory polyarthritis in the setting of history of discoid lupus, formally following with [facility] rheumatology for discoid lupus (per patient, biopsy-proven since childhood).  Subsequent lab workup notable for only low positive SHARAD with negative dsDNA and normal complements.  Normal RF/CCP.    Synovitis significantly improved since starting Cosentyx (ordered with dermatology for hidradenitis suppurativa).  Did need a prednisone taper in March though which was helpful.  She has left greater than right shoulder pain today-received a right shoulder CSI with orthopedics a few months ago with improvement and prior x-ray consistent with degenerative, osteoarthritis of the right shoulder.  I suspect her current left shoulder pain is also related to underlying osteoarthritis.  Therefore, risk and benefit of steroid injections discussed with patient today to which she was agreeable.    Future treatment is complicated by her comorbidities including HS.  Would avoid TNF inhibitors going forward given discoid lupus.  Lastly, continues on hydroxychloroquine and states plans for HCQ eye exam tomorrow.     PLAN:  - S/P Cosentyx loading doses. On Cosentyx qm dose since Jan 2025 per Dermatology for severe HS.  -Continue hydroxychloroquine 400 mg daily (less than 5 mg/kg).         -Previously discussed with patient side effects of Plaquenil which include but are not limited to retinopathy skin changes, blood abnormalities, hepatotoxicity, cardiotoxicity, neuro changes such as  dizziness, fatigue, irritability, myopathy.         -HCQ-Eye Exam with Dr. Tal Ribeiro, per patient next appt 4/10/25  -PRN Prednisone 5 mg q AM  - Consult/evaluation communicated with referring physician/provider.       -Derm: Dr. Pearson       -Opthm: Dr. Tal Ribeiro     PROCEDURE: LEFT Glenohumoral Steroid Injection Pocedure Note  INDICATION: LEFT Shoulder Pain    Procedure:After consent and discussing pro and cons as well as alternatives to treatment. Prior to the injection the area was cleansed with Povidone-Iodine swabs and alcohol wipes. The left glenohumoral joint was then injected with Xylocaine 1% x 1 mL first before a mixture of Xylocaine 1% x 2 mL plus Triamcinolone 40 mg, followed by Xylocaine 1% x1 mL . Patient tolerated procedure well. Post procedure instructions were given as well as instructions for followup. Patient to call with any questions especially the fever, chills worsening pain or erythema. Answered all their questions.     Patient to follow-up in late October with repeat labs prior    Isela Trimble DO  4/9/2025   11:51 AM    Discussed with patient that they are on chronic treatment with a high-risk medication that requires close lab monitoring for possible drug toxicity.  Additionally, discussed with patient give the possible immunosuppressive effects of their medication as well as their underlying hyper-inflammatory state, the importance of keeping vaccinations and age-appropriate screenings up-to-date, given increased risk of complications and malignancies seen in these patient populations.  Patient to f/u with repeat labs drawn prior (standing labs ordered).  Last QuantiFERON TB testing: 10/24/24  Last Hepatitis testing: 10/24/24    SUBJECTIVE:     4/9/25 Follow-Up: Prednisone taper sent in March which as helpful. Has had R shoulder injection 2-3 months ago with orthopedics which was helpful. Cosentyx helpful for HS but she is not sure about its effectiveness on her  arthralgias (worse at night).  Right shoulder x-ray, as below.  Plan to see ophthalmology tomorrow.    Current Medications:  Hydroxychloroquine 400 mg daily-resumed 5/21/2024  Cosentyx qm (monthly doses starting Jan 2025)- with Derm for HS      -s/p once weekly loading doses November    Medication History:  Naproxen 500 mg twice daily    Dapsone-ineffective  Pimecrolimus  PO Methotrexate (can't remember side effects) > Subcutaneous methotrexate 20 mg once weekly-insurance issues per notes. Patient noting skin burning   Leflunomide 20 mg daily-polyarthralgias, uncontrolled skin disease    Interval History:     5/21/2024 initial consult: I had the pleasure of seeing Damari Waterman on 5/21/2024 as a new outpatient consultation for Discoid Lupus. The patient was originally referred by her PCP, Dr. Flor Jenkins.     54 year old female w/ PMH Discoid SLE, Glaucoma, HTN, asthma, RLS, GERD, ABAD, pre-diabetes who presents to clinic today.Note, patient formally following with [facility] rheumatology, Dr. Connor, with last appointment 9/20/2023.  During that appointment, patient presenting with history of discoid lupus since she was 10 years old, on hydroxychloroquine 200 mg twice daily for 5 years.  However, noting progression in her skin lesions including: Nose, face.  Using topical tacrolimus as needed per her dermatologist.  No other history of other organ involvement.  Was taking leflunomide at the time with progressive arthralgias and new skin lesions.  Suspected flare of symptoms as being off dapsone versus viral syndrome versus due to her multiple boils.  Recommended dapsone 100 mg daily and a Medrol dose taper.  Was recommended to continue leflunomide.  Possible swab to one of the lesions to rule out MRSA.    Patient presents as a new consult today to establish care.  States she stopped hydroxychloroquine about a year ago and has tried multiple different DMARDs but is unsure regarding whether she experienced  side effects and why medications were stopped/modified.  Had a reportedly normal eye exam in January.  Was prescribed a prednisone taper x 5 days with improvement of her polyarthralgias.  Notes morning stiffness as well as associated joint pain with swelling.  ROS also positive for frequent chills, hair loss, episodic ulcers in her nose/mouth, as well as burning/itching sensation in the sun.  Also notes persistence of the skin lesions which can affect her armpits, under her breasts, groin.  No measured fevers, history of uveitis/iritis, pleurisy, Raynaud's phenomenon.  Notes sicca symptoms.  Family history notable for father with RA.      9/26/2024 follow-up:  Patient notes significant improvement in her polyarthralgia since starting hydroxychloroquine.  However, feels that her boils are getting worse.  Notes that these boils seem different than her previous discoid lupus lesions.    12/24/24 Follow-Up:   Of note, patient recently seen by dermatology, Dr. Pearson, on 10/24/2024.  Given the extent of her eye hidradenitis suppurativa, in addition to topicals, also discussed starting Cosentyx for severe hidradenitis suppurativa and PA started.    Patient started her once weekly loading doses of Cosentyx x 4 total doses in November with plans for her first monthly dose of Cosentyx starting January 2025 with dermatology.  Notes flare of arthralgias (especially of her shoulders and knees) starting early December, responsive to steroids but she is worried about the swelling she has been experiencing while on prednisone and would like to limit her prednisone dosing.  Additionally, continues on hydroxychloroquine with reportedly recent normal eye exam about 2 weeks ago, Dr. Tal Ribeiro.     HISTORY:  Past Medical History:    Acute bilateral low back pain    Anemia    Anesthesia complication    had difficulty waking up after gastric sleeve surgery in 2018    Asthma (HCC)    Bronchitis    Cataract    right eye     Essential hypertension    Glaucoma    left eye    High blood pressure    Hypokalemia    Interscapular pain    Last Assessment & Plan:    Formatting of this note might be different from the original.   Patient reports pain is a 2/10 today.      Lupus    Neck pain    Last Assessment & Plan:    Formatting of this note might be different from the original.   Patient reports pain is a 2/10 today.      Osteoarthritis    Prediabetes    Right otitis media    Sleep apnea    Slow transit constipation    Type 2 diabetes mellitus without complication (HCC)    Visual impairment    reading glasses      Social Hx Reviewed   Family Hx Reviewed     Medications (Active prior to today's visit):  Current Outpatient Medications   Medication Sig Dispense Refill    predniSONE 5 MG Oral Tab Take 4 tablets (20 mg total) by mouth daily for 3 days, THEN 3 tablets (15 mg total) daily for 3 days, THEN 2 tablets (10 mg total) daily for 3 days, THEN 1 tablet (5 mg total) daily for 3 days, THEN 1 tablet (5 mg total) daily as needed. Take as instructed in the morning with breakfast for 12 days.  Then, after 12 days, only take prednisone up to 1 tablet daily as needed for breakthrough pain.. 60 tablet 0    losartan 100 MG Oral Tab Take 1 tablet (100 mg total) by mouth daily. 90 tablet 0    Fezolinetant (VEOZAH) 45 MG Oral Tab Take 1 tablet by mouth daily. 30 tablet 3    HYDROXYCHLOROQUINE 200 MG Oral Tab TAKE 2 TABLETS(400 MG) BY MOUTH DAILY 180 tablet 2    loratadine 10 MG Oral Tab Take 1 tablet (10 mg total) by mouth daily. 90 tablet 3    carvedilol 12.5 MG Oral Tab Take 1 tablet (12.5 mg total) by mouth 2 (two) times daily with meals. 180 tablet 0    furosemide 20 MG Oral Tab TAKE 1 TO 2 TABLETS(20 TO 40 MG) BY MOUTH DAILY AS NEEDED 180 tablet 0    rosuvastatin 10 MG Oral Tab Take 1 tablet (10 mg total) by mouth nightly. 90 tablet 3    Meloxicam 15 MG Oral Tab Take 1 tablet (15 mg total) by mouth daily. 30 tablet 0    pantoprazole 40 MG Oral Tab  EC Take 1 tablet (40 mg total) by mouth 2 (two) times daily before meals. 180 tablet 0    fluticasone propionate 50 MCG/ACT Nasal Suspension 2 sprays by Nasal route daily. 48 g 3    Acetaminophen ER (TYLENOL 8 HOUR ARTHRITIS PAIN) 650 MG Oral Tab CR Take 1 tablet (650 mg total) by mouth every 8 (eight) hours as needed for Pain.      Blood Pressure Monitor Does not apply Device Please monitor blood pressure once daily 1 each 0    amLODIPine 5 MG Oral Tab Take 1 tablet (5 mg total) by mouth daily. (Patient taking differently: Take 0.5 tablets (2.5 mg total) by mouth daily.) 90 tablet 3    CUSTOM MEDICATION Semaglutide/cyanocobalamin injection    Concentration: 5 mg/ 0.5 mL   Amount: 2.5 mL vial x2 (total of 5 mL) with supplies  Instructions: Inject 0.1 mL weekly subcutaneous 1 each 0    CUSTOM MEDICATION Semaglutide/cyanocobalamin injection    Concentration: 1 mg/ 0.5 mL   Amount: 5 ML vial with supplies  Instructions: Inject 0.25 mL weekly (Patient taking differently: 2.5 mg once a week. Semaglutide/cyanocobalamin injection    Concentration: 1 mg/ 0.5 mL   Amount: 5 ML vial with supplies  Instructions: Inject 0.25 mL weekly    Every Monday) 1 each 0    COSENTYX UNOREADY 300 MG/2ML Subcutaneous Solution Auto-injector every 30 (thirty) days.      clindamycin 1 % External Lotion Apply twice daily with flares (Patient taking differently: as needed. Apply twice daily with flares) 60 mL 11    Benzoyl Peroxide (BENZOYL PEROXIDE WASH) 10 % External Liquid Use daily as wash in shower to affected areas (Patient taking differently: Use daily as wash in shower to affected areas    Not started. Currently using Cerave facial wash.) 227 g 3    naproxen 500 MG Oral Tab Take 1 tablet (500 mg total) by mouth 2 (two) times daily as needed.      latanoprost 0.005 % Ophthalmic Solution Place 1 drop into both eyes nightly.      spironolactone 25 MG Oral Tab Take 1 tablet (25 mg total) by mouth daily.      Calcium Citrate-Vitamin D  200-6.25 MG-MCG Oral Tab Take 1 tablet by mouth daily.      ferrous sulfate 325 (65 FE) MG Oral Tab EC Take 1 tablet (325 mg total) by mouth every other day.      Multiple Vitamins-Minerals (MULTI-VITAMIN/MINERALS) Oral Tab Take 1 tablet by mouth daily.      montelukast 10 MG Oral Tab Take 1 tablet (10 mg total) by mouth daily.         Allergies:  No Known Allergies      ROS:   Review of Systems   Constitutional:  Negative for chills and fever.   HENT:  Negative for congestion, hearing loss, mouth sores, nosebleeds and trouble swallowing.    Eyes:  Negative for photophobia, pain, redness and visual disturbance.   Respiratory:  Negative for cough and shortness of breath.    Cardiovascular:  Negative for chest pain, palpitations and leg swelling.   Gastrointestinal:  Negative for abdominal pain, blood in stool, diarrhea and nausea.   Endocrine: Negative for cold intolerance and heat intolerance.   Genitourinary:  Negative for dysuria, frequency and hematuria.   Musculoskeletal:  Positive for arthralgias. Negative for back pain, gait problem, joint swelling, myalgias, neck pain and neck stiffness.   Skin:  Positive for rash. Negative for color change and wound.   Neurological:  Negative for dizziness, weakness, numbness and headaches.   Psychiatric/Behavioral:  Negative for confusion and dysphoric mood.         PHYSICAL EXAM:     Constitutional:  Well developed, Well nourished, No acute distress  HENT:  Normocephalic, Atraumatic, Bilateral external ears normal, Oropharynx moist, No oral exudates.  Neck: Normal range of motion, No tenderness, Supple, No stridor.  Eyes:  PERRL, EOMI, Conjunctiva normal, No discharge.  Respiratory:  Normal breath sounds, No respiratory distress, No wheezing.  Cardiovascular:  Normal heart rate, Normal rhythm, No murmurs, No rubs, No gallops.  GI:  Bowel sounds normal, Soft, No tenderness, No masses, No pulsatile masses.  : No CVA tenderness.  Musculoskeletal: Limited bilateral shoulder  ROM with active/passive ROM.  A comprehensive 28 count joint exam was done and was negative for swelling or tenderness except as noted. Inspections for misalignment, asymmetry, crepitation, defects, tenderness, masses, nodules, effusions, range of motion, and stability in the upper and lower extremities bilaterally are all normal unless noted.           Integument:  Warm, Dry, No erythema  Lymphatic:  No lymphadenopathy noted.  Neurologic:  Alert & oriented x 3, Normal motor function, Normal sensory function, No focal deficits noted.  Psychiatric:  Affect normal, Judgment normal, Mood normal.    LABS:     Lab work reviewed and notable for:    2025:  CMP with BUN 16, CR 1.12 (high), LFTs not elevated, no gamma gap  CBC without cytopenias or leukocytosis  TSH 1.569 WNL    10/24/2024:    Acute hepatitis panel nonreactive, QuantiFERON TB test negative    2024:  BMP with BUN 22, CR 0.96,  CBC with WBC 3.7 (low), Hg, PLT WNL    2024:  BMP with BUN 22, CR 0.96  CBC with WBC 3.7 (borderline low), normal Hg/PLT    2024:  SHARAD 1: 80 homogenous with negative reflex antibodies, dsDNA by IFA less than 10 WNL  C3 111.9 WNL, C4 27.8 WNL  ESR 41 (high), CRP less than 0.4 WNL  RF less than 10 WNL, CCP 2.2 WNL    Quant TB and acute hepatitis panel negative    3/17/2024:  CMP with normal renal function, borderline alk phos 109 other LFTs WNL, no gamma gap noted  CBC without cytopenias or leukocytosis    2024:  dsDNA negative by crithidia IFA  C3 138 WNL, C4 40 WNL  CRP less than 1, ESR 43 (high)    11/10/2023:  UA negative blood/10 protein    3/21/2023:  G6PD 18 WNL  Imagin/22/25 R Shoulder XR:     Impression   CONCLUSION:  1. Mild primary osteoarthritic changes of the right shoulder.     2. No radiographically visible acute osseous injury of the right shoulder.

## 2025-04-10 ENCOUNTER — NURSE ONLY (OUTPATIENT)
Facility: CLINIC | Age: 55
End: 2025-04-10
Payer: COMMERCIAL

## 2025-04-10 DIAGNOSIS — R60.0 BILATERAL LOWER EXTREMITY EDEMA: ICD-10-CM

## 2025-04-10 PROCEDURE — 93970 EXTREMITY STUDY: CPT | Performed by: SURGERY

## 2025-04-14 RX ORDER — PREDNISONE 5 MG/1
5 TABLET ORAL DAILY PRN
Qty: 90 TABLET | Refills: 2 | Status: SHIPPED | OUTPATIENT
Start: 2025-04-14

## 2025-04-14 NOTE — TELEPHONE ENCOUNTER
LOV: 4/9/25  Future Appointments   Date Time Provider Department Center   4/15/2025  2:40 PM [facility] [place]   8/27/2025  9:15 AM [facility]       RHEUMATOLOGY CLINIC- FOLLOW UP     Damari Waterman is a 55 year old female.     ASSESSMENT/PLAN:          ICD-10-CM     1. 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 Inflammatory polyarthritis (HCC)  M06.4         3. Hidradenitis suppurativa  L73.2         4. Rash  R21         5. Acute pain of left shoulder  M25.512 DRAIN/INJECT LARGE JOINT/BURSA       triamcinolone acetonide (Kenalog-40) 40 MG/ML injection 40 mg                DISCUSSION:  Patient presents for follow-up of inflammatory polyarthritis in the setting of history of discoid lupus, formally following with [facility] rheumatology for discoid lupus (per patient, biopsy-proven since childhood).  Subsequent lab workup notable for only low positive SHARAD with negative dsDNA and normal complements.  Normal RF/CCP.     Synovitis significantly improved since starting Cosentyx (ordered with dermatology for hidradenitis suppurativa).  Did need a prednisone taper in March though which was helpful.  She has left greater than right shoulder pain today-received a right shoulder CSI with orthopedics a few months ago with improvement and prior x-ray consistent with degenerative, osteoarthritis of the right shoulder.  I suspect her current left shoulder pain is also related to underlying osteoarthritis.  Therefore, risk and benefit of steroid injections discussed with patient today to which she was agreeable.     Future treatment is complicated by her comorbidities including HS.  Would avoid TNF inhibitors going forward given discoid lupus.  Lastly, continues on hydroxychloroquine and states plans for HCQ eye exam tomorrow.      PLAN:  - S/P Cosentyx loading doses. On Cosentyx qm dose since Jan 2025 per Dermatology for severe HS.  -Continue hydroxychloroquine 400 mg daily (less than 5  mg/kg).         -Previously discussed with patient side effects of Plaquenil which include but are not limited to retinopathy skin changes, blood abnormalities, hepatotoxicity, cardiotoxicity, neuro changes such as dizziness, fatigue, irritability, myopathy.         -HCQ-Eye Exam with Dr. Tal Ribeiro, per patient next appt 4/10/25  -PRN Prednisone 5 mg q AM  - Consult/evaluation communicated with referring physician/provider.       -Derm: Dr. Pearson       -Opthm: Dr. Tal Ribeiro      PROCEDURE: LEFT Glenohumoral Steroid Injection Pocedure Note  INDICATION: LEFT Shoulder Pain     Procedure:After consent and discussing pro and cons as well as alternatives to treatment. Prior to the injection the area was cleansed with Povidone-Iodine swabs and alcohol wipes. The left glenohumoral joint was then injected with Xylocaine 1% x 1 mL first before a mixture of Xylocaine 1% x 2 mL plus Triamcinolone 40 mg, followed by Xylocaine 1% x1 mL . Patient tolerated procedure well. Post procedure instructions were given as well as instructions for followup. Patient to call with any questions especially the fever, chills worsening pain or erythema. Answered all their questions.      Patient to follow-up in late October with repeat labs prior     Isela Trimble DO  4/9/2025   11:51 AM

## 2025-04-15 ENCOUNTER — HOSPITAL ENCOUNTER (OUTPATIENT)
Dept: MAMMOGRAPHY | Age: 55
Discharge: HOME | End: 2025-04-15
Attending: INTERNAL MEDICINE
Payer: COMMERCIAL

## 2025-04-15 DIAGNOSIS — Z12.31 SCREENING MAMMOGRAM FOR BREAST CANCER: ICD-10-CM

## 2025-04-15 PROCEDURE — 77067 SCR MAMMO BI INCL CAD: CPT | Performed by: INTERNAL MEDICINE

## 2025-04-15 PROCEDURE — 77063 BREAST TOMOSYNTHESIS BI: CPT | Performed by: INTERNAL MEDICINE

## 2025-04-21 RX ORDER — PANTOPRAZOLE SODIUM 40 MG/1
40 TABLET, DELAYED RELEASE ORAL
Qty: 180 TABLET | Refills: 0 | Status: SHIPPED | OUTPATIENT
Start: 2025-04-21

## 2025-04-21 NOTE — TELEPHONE ENCOUNTER
Requested Prescriptions     Pending Prescriptions Disp Refills    PANTOPRAZOLE 40 MG Oral Tab EC [Pharmacy Med Name: PANTOPRAZOLE 40MG TABLETS] 180 tablet 0     Sig: TAKE 1 TABLET(40 MG) BY MOUTH TWICE DAILY BEFORE MEALS       LOV 5/06/2024  LR  1/20/2025  Last CLN done 1/15/2025 w/ Dr. Kan    em

## 2025-04-22 ENCOUNTER — TELEPHONE (OUTPATIENT)
Facility: CLINIC | Age: 55
End: 2025-04-22

## 2025-04-22 NOTE — TELEPHONE ENCOUNTER
Patient contacted and appointment made for 05/12/2025 at 1:00 pm at [facility] with Lisa Mac.  Address given.  Patient voiced understanding.

## 2025-04-22 NOTE — TELEPHONE ENCOUNTER
Patient calling to schedule appointment with Lisa Mac. Please call. (See patient message from 4/19/25)

## 2025-04-24 ENCOUNTER — TELEPHONE (OUTPATIENT)
Age: 55
End: 2025-04-24

## 2025-04-24 DIAGNOSIS — R23.2 HOT FLASHES: Primary | ICD-10-CM

## 2025-04-24 RX ORDER — GABAPENTIN 100 MG/1
100 CAPSULE ORAL NIGHTLY
Qty: 30 CAPSULE | Refills: 1 | Status: SHIPPED | OUTPATIENT
Start: 2025-04-24

## 2025-04-24 NOTE — TELEPHONE ENCOUNTER
Received a fax from [facility], Dr. Tal Ribeiro.  Appointment 12/12/2024: No evidence of hydroxychloroquine toxicity.  Recommend follow-up in 3 months.

## 2025-05-05 DIAGNOSIS — M25.472 LEFT ANKLE SWELLING: ICD-10-CM

## 2025-05-05 DIAGNOSIS — I10 BENIGN ESSENTIAL HYPERTENSION: ICD-10-CM

## 2025-05-06 RX ORDER — FUROSEMIDE 20 MG/1
TABLET ORAL
Qty: 180 TABLET | Refills: 0 | Status: SHIPPED | OUTPATIENT
Start: 2025-05-06

## 2025-05-06 NOTE — TELEPHONE ENCOUNTER
Please review: medication fails/has no protocol attached.    Future Appointments   Date Time Provider Department Center   7/30/2025 10:00 AM Susan Darnell APRN [facility] [place]

## 2025-05-07 DIAGNOSIS — M06.4 INFLAMMATORY POLYARTHRITIS (HCC): ICD-10-CM

## 2025-05-07 DIAGNOSIS — L93.0 DISCOID LUPUS: Primary | ICD-10-CM

## 2025-05-07 DIAGNOSIS — Z79.899 ENCOUNTER FOR LONG-TERM (CURRENT) USE OF HIGH-RISK MEDICATION: ICD-10-CM

## 2025-05-07 DIAGNOSIS — Z79.899 LONG-TERM USE OF HYDROXYCHLOROQUINE: ICD-10-CM

## 2025-05-08 ENCOUNTER — LAB ENCOUNTER (OUTPATIENT)
Dept: LAB | Age: 55
End: 2025-05-08
Attending: INTERNAL MEDICINE
Payer: COMMERCIAL

## 2025-05-08 ENCOUNTER — OFFICE VISIT (OUTPATIENT)
Dept: INTERNAL MEDICINE CLINIC | Facility: CLINIC | Age: 55
End: 2025-05-08

## 2025-05-08 VITALS
BODY MASS INDEX: 59 KG/M2 | HEART RATE: 71 BPM | OXYGEN SATURATION: 97 % | RESPIRATION RATE: 16 BRPM | SYSTOLIC BLOOD PRESSURE: 134 MMHG | TEMPERATURE: 97 F | DIASTOLIC BLOOD PRESSURE: 77 MMHG | HEIGHT: 61 IN

## 2025-05-08 DIAGNOSIS — R21 RASH: ICD-10-CM

## 2025-05-08 DIAGNOSIS — N64.4 BREAST TENDERNESS: Primary | ICD-10-CM

## 2025-05-08 DIAGNOSIS — M06.4 INFLAMMATORY POLYARTHRITIS (HCC): ICD-10-CM

## 2025-05-08 DIAGNOSIS — L73.2 HIDRADENITIS SUPPURATIVA: ICD-10-CM

## 2025-05-08 DIAGNOSIS — Z79.899 ENCOUNTER FOR LONG-TERM (CURRENT) USE OF HIGH-RISK MEDICATION: ICD-10-CM

## 2025-05-08 DIAGNOSIS — L93.0 DISCOID LUPUS: ICD-10-CM

## 2025-05-08 LAB
ALT SERPL-CCNC: 34 U/L (ref 10–49)
AST SERPL-CCNC: 28 U/L (ref ?–34)
BASOPHILS # BLD AUTO: 0.03 X10(3) UL (ref 0–0.2)
BASOPHILS NFR BLD AUTO: 0.7 %
CREAT BLD-MCNC: 1.08 MG/DL (ref 0.55–1.02)
CRP SERPL-MCNC: <0.4 MG/DL (ref ?–1)
DEPRECATED RDW RBC AUTO: 42 FL (ref 35.1–46.3)
EGFRCR SERPLBLD CKD-EPI 2021: 60 ML/MIN/1.73M2 (ref 60–?)
EOSINOPHIL # BLD AUTO: 0.09 X10(3) UL (ref 0–0.7)
EOSINOPHIL NFR BLD AUTO: 2.1 %
ERYTHROCYTE [DISTWIDTH] IN BLOOD BY AUTOMATED COUNT: 12.8 % (ref 11–15)
ERYTHROCYTE [SEDIMENTATION RATE] IN BLOOD: 51 MM/HR (ref 0–30)
HCT VFR BLD AUTO: 38.2 % (ref 35–48)
HGB BLD-MCNC: 12.3 G/DL (ref 12–16)
IMM GRANULOCYTES # BLD AUTO: 0 X10(3) UL (ref 0–1)
IMM GRANULOCYTES NFR BLD: 0 %
LYMPHOCYTES # BLD AUTO: 1.83 X10(3) UL (ref 1–4)
LYMPHOCYTES NFR BLD AUTO: 42 %
MCH RBC QN AUTO: 29.1 PG (ref 26–34)
MCHC RBC AUTO-ENTMCNC: 32.2 G/DL (ref 31–37)
MCV RBC AUTO: 90.5 FL (ref 80–100)
MONOCYTES # BLD AUTO: 0.62 X10(3) UL (ref 0.1–1)
MONOCYTES NFR BLD AUTO: 14.2 %
NEUTROPHILS # BLD AUTO: 1.79 X10 (3) UL (ref 1.5–7.7)
NEUTROPHILS # BLD AUTO: 1.79 X10(3) UL (ref 1.5–7.7)
NEUTROPHILS NFR BLD AUTO: 41 %
PLATELET # BLD AUTO: 245 10(3)UL (ref 150–450)
RBC # BLD AUTO: 4.22 X10(6)UL (ref 3.8–5.3)
WBC # BLD AUTO: 4.4 X10(3) UL (ref 4–11)

## 2025-05-08 PROCEDURE — 84460 ALANINE AMINO (ALT) (SGPT): CPT

## 2025-05-08 PROCEDURE — 99213 OFFICE O/P EST LOW 20 MIN: CPT | Performed by: NURSE PRACTITIONER

## 2025-05-08 PROCEDURE — 85025 COMPLETE CBC W/AUTO DIFF WBC: CPT

## 2025-05-08 PROCEDURE — 86480 TB TEST CELL IMMUN MEASURE: CPT

## 2025-05-08 PROCEDURE — 85652 RBC SED RATE AUTOMATED: CPT

## 2025-05-08 PROCEDURE — 3008F BODY MASS INDEX DOCD: CPT | Performed by: NURSE PRACTITIONER

## 2025-05-08 PROCEDURE — 36415 COLL VENOUS BLD VENIPUNCTURE: CPT

## 2025-05-08 PROCEDURE — 3078F DIAST BP <80 MM HG: CPT | Performed by: NURSE PRACTITIONER

## 2025-05-08 PROCEDURE — 86140 C-REACTIVE PROTEIN: CPT

## 2025-05-08 PROCEDURE — 3075F SYST BP GE 130 - 139MM HG: CPT | Performed by: NURSE PRACTITIONER

## 2025-05-08 PROCEDURE — 82565 ASSAY OF CREATININE: CPT

## 2025-05-08 PROCEDURE — 84450 TRANSFERASE (AST) (SGOT): CPT

## 2025-05-08 NOTE — PROGRESS NOTES
HPI:    Patient ID: Damari Waterman is a 55 year old female.    HPIBilateral Breast Tenderness  55 year old female who recently had a mammogram on 4/15/2025    Impression   CONCLUSION:   There is no mammographic evidence of malignancy in either breast. As long as patient's clinical breast exam remains unchanged, annual screening mammogram is recommended.     BI-RADS CATEGORY:    DIAGNOSTIC CATEGORY 1 - NEGATIVE ASSESSMENT.       RECOMMENDATIONS:  ROUTINE MAMMOGRAM AND CLINICAL EVALUATION IN 12 MONTHS.       Immunization History   Administered Date(s) Administered    >=3 YRS TRI  MULTIDOSE VIAL (19622) FLU CLINIC 11/18/2014, 09/29/2015    Covid-19 Vaccine Moderna 100 mcg/0.5 ml 03/19/2021, 04/19/2021    Covid-19 Vaccine Moderna 50 Mcg/0.25 Ml 01/11/2022    FLUZONE 6 months and older PFS 0.5 ml (38402) 11/01/2017, 02/21/2019, 10/23/2019, 12/09/2020, 10/11/2021, 09/14/2022, 11/10/2023    Influenza Vaccine, trivalent (IIV3), PF 0.5mL (62950) 12/30/2024    TDAP 01/28/2020    Tb Intradermal Test 11/26/2018    Zoster Vaccine Recombinant Adjuvanted (Shingrix) 08/29/2023, 11/10/2023       Past Medical History[1]   Past Surgical History[2]   Social Hx on file[3]       Review of Systems   Constitutional:  Negative for chills, fatigue and fever.   HENT:  Negative for ear pain, hearing loss, sinus pain, sore throat and trouble swallowing.    Eyes:  Negative for pain and visual disturbance.   Respiratory:  Negative for cough, chest tightness and shortness of breath.    Cardiovascular:  Negative for chest pain, palpitations and leg swelling.   Gastrointestinal:  Negative for abdominal pain, constipation, diarrhea, nausea and vomiting.   Endocrine: Negative for cold intolerance and heat intolerance.   Genitourinary:  Negative for dysuria and hematuria.   Musculoskeletal:  Negative for back pain and joint swelling.        Breast tenderness after mammogram   Skin:  Negative for rash.   Allergic/Immunologic: Negative for  environmental allergies.   Neurological:  Negative for weakness, numbness and headaches.   Hematological:  Does not bruise/bleed easily.   Psychiatric/Behavioral:  Negative for dysphoric mood and sleep disturbance. The patient is not nervous/anxious.             Current Medications[4]  Allergies:Allergies[5]   PHYSICAL EXAM:   Physical Exam  Constitutional:       Appearance: Normal appearance. She is well-developed.   HENT:      Head: Normocephalic.   Cardiovascular:      Rate and Rhythm: Normal rate and regular rhythm.      Heart sounds: Normal heart sounds. No murmur heard.     No friction rub. No gallop.   Pulmonary:      Effort: Pulmonary effort is normal. No respiratory distress.      Breath sounds: Normal breath sounds. No wheezing, rhonchi or rales.   Chest:      Comments:   Skin check normal.  No significant abnormal nevi.  Breast exam completed-no palpable abnormalities, discharge from the nipples or axillary adenopathy.         Abdominal:      General: Bowel sounds are normal. There is no distension.      Palpations: Abdomen is soft. There is no mass.      Tenderness: There is no abdominal tenderness. There is no right CVA tenderness, left CVA tenderness or guarding.   Musculoskeletal:         General: No tenderness.      Cervical back: Normal range of motion and neck supple. No tenderness.      Right lower leg: No edema.      Left lower leg: No edema.   Lymphadenopathy:      Cervical: No cervical adenopathy.   Skin:     General: Skin is warm and dry.      Findings: No rash.   Neurological:      Mental Status: She is alert and oriented to person, place, and time.      Coordination: Coordination normal.      Gait: Gait normal.   Psychiatric:         Mood and Affect: Mood normal.         Behavior: Behavior normal.         Thought Content: Thought content normal.         Judgment: Judgment normal.       /77 (BP Location: Right arm, Patient Position: Sitting, Cuff Size: large)   Pulse 71   Temp 97 °F  (36.1 °C) (Temporal)   Resp 16   Ht 5' 1\" (1.549 m)   SpO2 97%   BMI 58.95 kg/m²   Wt Readings from Last 2 Encounters:   04/09/25 (!) 312 lb (141.5 kg)   03/06/25 287 lb (130.2 kg)     Body mass index is 58.95 kg/m².(2)  Lab Results   Component Value Date    WBC 7.1 01/29/2025    RBC 4.34 01/29/2025    HGB 12.9 01/29/2025    HCT 37.1 01/29/2025    MCV 85.5 01/29/2025    MCH 29.7 01/29/2025    MCHC 34.8 01/29/2025    RDW 12.4 01/29/2025    .0 01/29/2025      Lab Results   Component Value Date    GLU 98 01/29/2025    BUN 16 01/29/2025    BUNCREA 14.3 01/29/2025    CREATSERUM 1.12 (H) 01/29/2025    ANIONGAP 10 01/29/2025    CA 10.1 01/29/2025    OSMOCALC 291 01/29/2025    ALKPHO 82 01/29/2025    AST 17 01/29/2025    ALT 16 01/29/2025    BILT 0.9 01/29/2025    TP 7.7 01/29/2025    ALB 4.8 01/29/2025    GLOBULIN 2.9 01/29/2025     01/29/2025    K 3.9 01/29/2025     01/29/2025    CO2 27.0 01/29/2025      Lab Results   Component Value Date     07/17/2024    A1C 5.5 07/17/2024      Lab Results   Component Value Date    CHOLEST 222 (H) 01/29/2025    TRIG 67 01/29/2025    HDL 65 (H) 01/29/2025     (H) 01/29/2025    VLDL 12 01/29/2025    NONHDLC 157 (H) 01/29/2025      Lab Results   Component Value Date    TSH 1.569 01/29/2025                ASSESSMENT/PLAN:     Assessment & Plan  Breast tenderness  Breast tenderness post mammogram  Breast Exam- WNL    Plan   Tylenol two tabs every six hours x 3 days. Not to exceed 4 grams in one day.     If pain persists please let us know               No orders of the defined types were placed in this encounter.      Meds This Visit:  Requested Prescriptions      No prescriptions requested or ordered in this encounter       Imaging & Referrals:  None         MANUEL Richardson          [1]   Past Medical History:   Acute bilateral low back pain    Anemia    Anesthesia complication    had difficulty waking up after gastric sleeve surgery in 2018    Asthma  (HCC)    Bronchitis    Cataract    right eye    Essential hypertension    Glaucoma    left eye    High blood pressure    Hypokalemia    Interscapular pain    Last Assessment & Plan:    Formatting of this note might be different from the original.   Patient reports pain is a 2/10 today.      Lupus    Neck pain    Last Assessment & Plan:    Formatting of this note might be different from the original.   Patient reports pain is a 2/10 today.      Osteoarthritis    Prediabetes    Right otitis media    Sleep apnea    Slow transit constipation    Type 2 diabetes mellitus without complication (HCC)    Visual impairment    reading glasses   [2]   Past Surgical History:  Procedure Laterality Date    Colonoscopy N/A 01/15/2025    Natacha Kan MD    Colonoscopy N/A 01/15/2025    Procedure: COLONOSCOPY;  Surgeon: Natacha Kan MD;  Location: [facility] ENDOSCOPY    Egd N/A 01/15/2025    Natacha Kan MD    Hysterectomy      Lap gastric bypass/daniel-en-y      Total abdom hysterectomy     [3]   Social History  Socioeconomic History    Marital status: Single   Tobacco Use    Smoking status: Never     Passive exposure: Never    Smokeless tobacco: Never   Vaping Use    Vaping status: Never Used   Substance and Sexual Activity    Alcohol use: Never    Drug use: Never   Other Topics Concern    Reaction to local anesthetic No    Pt has a pacemaker No    Pt has a defibrillator No   [4]   Current Outpatient Medications   Medication Sig Dispense Refill    furosemide 20 MG Oral Tab TAKE 1 TO 2 TABLETS (20 MG TO 40 MG) BY MOUTH DAILY AS NEEDED 180 tablet 0    PANTOPRAZOLE 40 MG Oral Tab EC TAKE 1 TABLET(40 MG) BY MOUTH TWICE DAILY BEFORE MEALS 180 tablet 0    predniSONE 5 MG Oral Tab Take 1 tablet (5 mg total) by mouth daily as needed. Take as needed for pain with breakfast. 90 tablet 2    losartan 100 MG Oral Tab Take 1 tablet (100 mg total) by mouth daily. 90 tablet 0    HYDROXYCHLOROQUINE 200 MG Oral Tab TAKE 2 TABLETS(400 MG) BY MOUTH DAILY  180 tablet 2    loratadine 10 MG Oral Tab Take 1 tablet (10 mg total) by mouth daily. 90 tablet 3    carvedilol 12.5 MG Oral Tab Take 1 tablet (12.5 mg total) by mouth 2 (two) times daily with meals. 180 tablet 0    rosuvastatin 10 MG Oral Tab Take 1 tablet (10 mg total) by mouth nightly. 90 tablet 3    fluticasone propionate 50 MCG/ACT Nasal Suspension 2 sprays by Nasal route daily. 48 g 3    Acetaminophen ER (TYLENOL 8 HOUR ARTHRITIS PAIN) 650 MG Oral Tab CR Take 1 tablet (650 mg total) by mouth every 8 (eight) hours as needed for Pain.      Blood Pressure Monitor Does not apply Device Please monitor blood pressure once daily 1 each 0    amLODIPine 5 MG Oral Tab Take 1 tablet (5 mg total) by mouth daily. 90 tablet 3    COSENTYX UNOREADY 300 MG/2ML Subcutaneous Solution Auto-injector every 30 (thirty) days.      clindamycin 1 % External Lotion Apply twice daily with flares 60 mL 11    Benzoyl Peroxide (BENZOYL PEROXIDE WASH) 10 % External Liquid Use daily as wash in shower to affected areas 227 g 3    naproxen 500 MG Oral Tab Take 1 tablet (500 mg total) by mouth 2 (two) times daily as needed.      latanoprost 0.005 % Ophthalmic Solution Place 1 drop into both eyes nightly.      spironolactone 25 MG Oral Tab Take 1 tablet (25 mg total) by mouth daily.      Calcium Citrate-Vitamin D 200-6.25 MG-MCG Oral Tab Take 1 tablet by mouth daily.      ferrous sulfate 325 (65 FE) MG Oral Tab EC Take 1 tablet (325 mg total) by mouth every other day.      Multiple Vitamins-Minerals (MULTI-VITAMIN/MINERALS) Oral Tab Take 1 tablet by mouth daily.      montelukast 10 MG Oral Tab Take 1 tablet (10 mg total) by mouth daily.      gabapentin 100 MG Oral Cap Take 1 capsule (100 mg total) by mouth nightly. (Patient not taking: Reported on 5/8/2025) 30 capsule 1    Fezolinetant (VEOZAH) 45 MG Oral Tab Take 1 tablet by mouth daily. (Patient not taking: Reported on 5/8/2025) 30 tablet 3    Meloxicam 15 MG Oral Tab Take 1 tablet (15 mg  total) by mouth daily. (Patient not taking: Reported on 5/8/2025) 30 tablet 0    CUSTOM MEDICATION Semaglutide/cyanocobalamin injection    Concentration: 5 mg/ 0.5 mL   Amount: 2.5 mL vial x2 (total of 5 mL) with supplies  Instructions: Inject 0.1 mL weekly subcutaneous (Patient not taking: Reported on 5/8/2025) 1 each 0    CUSTOM MEDICATION Semaglutide/cyanocobalamin injection    Concentration: 1 mg/ 0.5 mL   Amount: 5 ML vial with supplies  Instructions: Inject 0.25 mL weekly (Patient not taking: Reported on 5/8/2025) 1 each 0   [5] No Known Allergies

## 2025-05-08 NOTE — ASSESSMENT & PLAN NOTE
Breast tenderness post mammogram  Breast Exam- WNL    Plan   Tylenol two tabs every six hours x 3 days. Not to exceed 4 grams in one day.     If pain persists please let us know

## 2025-05-10 LAB
M TB IFN-G CD4+ T-CELLS BLD-ACNC: 0.07 IU/ML
M TB TUBERC IFN-G BLD QL: NEGATIVE
M TB TUBERC IGNF/MITOGEN IGNF CONTROL: >10 IU/ML
QFT TB1 AG MINUS NIL: 0 IU/ML
QFT TB2 AG MINUS NIL: -0.01 IU/ML

## 2025-05-18 DIAGNOSIS — I10 BENIGN ESSENTIAL HYPERTENSION: ICD-10-CM

## 2025-05-20 RX ORDER — CARVEDILOL 12.5 MG/1
12.5 TABLET ORAL 2 TIMES DAILY WITH MEALS
Qty: 180 TABLET | Refills: 3 | Status: SHIPPED | OUTPATIENT
Start: 2025-05-20

## 2025-06-09 ENCOUNTER — PATIENT MESSAGE (OUTPATIENT)
Facility: CLINIC | Age: 55
End: 2025-06-09

## 2025-06-09 ENCOUNTER — TELEPHONE (OUTPATIENT)
Facility: CLINIC | Age: 55
End: 2025-06-09

## 2025-06-09 NOTE — TELEPHONE ENCOUNTER
Patient contacted, accepted below appointment and given office directions.  Your Appointments      Friday June 13, 2025 8:00 AM  Follow Up Visit with Lisa Mac PA-C  [facility], [facility] ([facility]) [facility] [address] [address]  [phone]

## 2025-06-16 ENCOUNTER — TELEPHONE (OUTPATIENT)
Dept: CASE MANAGEMENT | Age: 55
End: 2025-06-16

## 2025-06-16 DIAGNOSIS — M25.561 PAIN IN BOTH KNEES, UNSPECIFIED CHRONICITY: Primary | ICD-10-CM

## 2025-06-16 DIAGNOSIS — M25.562 PAIN IN BOTH KNEES, UNSPECIFIED CHRONICITY: Primary | ICD-10-CM

## 2025-06-16 NOTE — TELEPHONE ENCOUNTER
Jen Davenport,     Patient was last seen by you, patient is requesting a referral to Dr. Su for bilateral knee pain.      Pended referral please review diagnosis and sign off if you agree.    Thank you.  Felecia Cleary  [facility]

## 2025-06-18 ENCOUNTER — TELEPHONE (OUTPATIENT)
Dept: CASE MANAGEMENT | Age: 55
End: 2025-06-18

## 2025-06-18 ENCOUNTER — TELEPHONE (OUTPATIENT)
Dept: INTERNAL MEDICINE CLINIC | Facility: CLINIC | Age: 55
End: 2025-06-18

## 2025-06-18 DIAGNOSIS — L93.0 DISCOID LUPUS: Primary | ICD-10-CM

## 2025-06-18 DIAGNOSIS — M06.4 INFLAMMATORY POLYARTHRITIS (HCC): ICD-10-CM

## 2025-06-18 NOTE — TELEPHONE ENCOUNTER
Patient states she has been having bilateral knee pain and leg swelling for weeks now.  Patient has seen Dr Su in the past, however, needs a new referral.  Patient has been trying to get a new referral to see orthopedics.  Chart reviewed.  New referral was authorized on 6/16 by MANUEL Richardson  Advised Patient will fax referral to the office.  Patient states she will call Dr. Suazo's office again to schedule an appointment,      Referral right faxed and received confirmation.  Referred to Provider Information:  Provider Address Phone   Oliver Su MD [facility] [address]  [address]  [facility] [address] [phone]

## 2025-06-18 NOTE — TELEPHONE ENCOUNTER
Jen Davenport,     Patient requesting referral to Dr. Trimble.     Patient also is asking if there is anything you recommend for arthritis pain.     Pended referral please review diagnosis and sign off if you agree.    Thank you.  Felecia Cleary  [facility]

## 2025-06-25 ENCOUNTER — PATIENT MESSAGE (OUTPATIENT)
Dept: INTERNAL MEDICINE CLINIC | Facility: CLINIC | Age: 55
End: 2025-06-25

## 2025-06-25 RX ORDER — MELOXICAM 15 MG/1
15 TABLET ORAL DAILY
Qty: 30 TABLET | Refills: 0 | OUTPATIENT
Start: 2025-06-25

## 2025-06-27 NOTE — TELEPHONE ENCOUNTER
Patient called requesting a refill on the following:    Albuterol Sulfate    Per patient she is completely out. Please send refill to the following pharmacy:    [facility]    [phone]    [facility]

## 2025-06-28 RX ORDER — ALBUTEROL SULFATE 90 UG/1
2 AEROSOL, METERED RESPIRATORY (INHALATION) EVERY 4 HOURS PRN
Qty: 1 EACH | Refills: 0 | Status: SHIPPED | OUTPATIENT
Start: 2025-06-28

## 2025-06-28 NOTE — TELEPHONE ENCOUNTER
Please review; protocol failed/ has no protocol    Ozzy Smyth hours ago (2:43 PM)     KT  Patient called requesting a refill on the following:     Albuterol Sulfate     Per patient she is completely out. Please send refill to the following pharmacy:     [facility] [address]      Routing to pod mate as Susan Darnell is out of office

## 2025-06-29 NOTE — TELEPHONE ENCOUNTER
Refill passed per [facility] protocol.    Requested Prescriptions   Pending Prescriptions Disp Refills    LOSARTAN 100 MG Oral Tab [Pharmacy Med Name: LOSARTAN 100MG TABLETS] 90 tablet 0     Sig: TAKE 1 TABLET(100 MG) BY MOUTH DAILY       Hypertension Medications Protocol Passed - 6/29/2025 12:59 PM        Passed - CMP or BMP in past 12 months        Passed - Last BP reading less than 140/90     BP Readings from Last 1 Encounters:   05/08/25 134/77               Passed - In person appointment or virtual visit in the past 12 mos or appointment in next 3 mos     Recent Outpatient Visits              1 month ago Breast tenderness    [facility] Jen Davenport APRN    Office Visit    2 months ago [facility]ilateral lower extremity [facility]    Nurse Only    2 months ago Discoid lupus    [facility], [facility] Isela Trimble DO    Office Visit    3 months [facility]    Office Visit    4 months ago Hidradenitis suppurativa    [facility] Edvin Pearson MD    Office Visit          Future Appointments         Provider Department Appt Notes    In 1 month Susan Darnell APRN [facility] Pain in both breast    In 1 month Edvin Pearson MD [facility] follow up 6m    In 6 months Isela Trimble DO [facility] [address], [place] Follow up - Injection in knees                    Passed - EGFRCR or GFRAA > 50     GFR Evaluation  EGFRCR: 60 , resulted on 5/8/2025          Passed - Medication is active on med list             Future Appointments         Provider Department Appt Notes    In 1 month Susan Darnell APRN [facility],  [facility] Pain in both breast    In 1 month Edvin Pearson MD [facility], [facility] follow up 6m    In 6 months Isela Trimble DO [facility], [address], [place] Follow up - Injection in knees            Recent Outpatient Visits              1 month ago Breast tenderness    [facility], [facility] Jen Davenport APRN    Office Visit    2 months ago Bilateral lower extremity edema    [facility]    Nurse Only    2 months ago Discoid lupus    [facility], [facility], [place] Isela Trimble DO    Office Visit    3 months ago Bilateral lower extremity edema    [facility] Yin Frances APRN    Office Visit    4 months ago Hidradenitis suppurativa    [facility] Edvin Pearson MD    Office Visit

## 2025-06-30 ENCOUNTER — HOSPITAL ENCOUNTER (EMERGENCY)
Facility: HOSPITAL | Age: 55
Discharge: HOME | End: 2025-07-01
Attending: EMERGENCY MEDICINE
Payer: COMMERCIAL

## 2025-06-30 ENCOUNTER — TELEPHONE (OUTPATIENT)
Dept: INTERNAL MEDICINE CLINIC | Facility: CLINIC | Age: 55
End: 2025-06-30

## 2025-06-30 ENCOUNTER — APPOINTMENT (OUTPATIENT)
Dept: GENERAL RADIOLOGY | Facility: HOSPITAL | Age: 55
End: 2025-06-30
Attending: EMERGENCY MEDICINE
Payer: COMMERCIAL

## 2025-06-30 DIAGNOSIS — M17.0 OSTEOARTHRITIS OF BOTH KNEES, UNSPECIFIED OSTEOARTHRITIS TYPE: Primary | ICD-10-CM

## 2025-06-30 DIAGNOSIS — R07.89 CHEST PAIN, ATYPICAL: Primary | ICD-10-CM

## 2025-06-30 LAB
ANION GAP SERPL CALC-SCNC: 8 MMOL/L (ref 0–18)
BASOPHILS # BLD AUTO: 0.03 X10(3) UL (ref 0–0.2)
BASOPHILS NFR BLD AUTO: 0.7 %
BUN BLD-MCNC: 15 MG/DL (ref 9–23)
BUN/CREAT SERPL: 14 (ref 10–20)
CALCIUM BLD-MCNC: 9.1 MG/DL (ref 8.7–10.4)
CHLORIDE SERPL-SCNC: 107 MMOL/L (ref 98–112)
CO2 SERPL-SCNC: 25 MMOL/L (ref 21–32)
CREAT BLD-MCNC: 1.07 MG/DL (ref 0.55–1.02)
DEPRECATED RDW RBC AUTO: 38.8 FL (ref 35.1–46.3)
EGFRCR SERPLBLD CKD-EPI 2021: 61 ML/MIN/1.73M2 (ref 60–?)
EOSINOPHIL # BLD AUTO: 0.11 X10(3) UL (ref 0–0.7)
EOSINOPHIL NFR BLD AUTO: 2.6 %
ERYTHROCYTE [DISTWIDTH] IN BLOOD BY AUTOMATED COUNT: 12.1 % (ref 11–15)
GLUCOSE BLD-MCNC: 121 MG/DL (ref 70–99)
HCT VFR BLD AUTO: 36.7 % (ref 35–48)
HGB BLD-MCNC: 12.4 G/DL (ref 12–16)
IMM GRANULOCYTES # BLD AUTO: 0.01 X10(3) UL (ref 0–1)
IMM GRANULOCYTES NFR BLD: 0.2 %
LYMPHOCYTES # BLD AUTO: 1.92 X10(3) UL (ref 1–4)
LYMPHOCYTES NFR BLD AUTO: 45.7 %
MCH RBC QN AUTO: 29.5 PG (ref 26–34)
MCHC RBC AUTO-ENTMCNC: 33.8 G/DL (ref 31–37)
MCV RBC AUTO: 87.4 FL (ref 80–100)
MONOCYTES # BLD AUTO: 0.59 X10(3) UL (ref 0.1–1)
MONOCYTES NFR BLD AUTO: 14 %
NEUTROPHILS # BLD AUTO: 1.54 X10 (3) UL (ref 1.5–7.7)
NEUTROPHILS # BLD AUTO: 1.54 X10(3) UL (ref 1.5–7.7)
NEUTROPHILS NFR BLD AUTO: 36.8 %
OSMOLALITY SERPL CALC.SUM OF ELEC: 292 MOSM/KG (ref 275–295)
PLATELET # BLD AUTO: 245 10(3)UL (ref 150–450)
POTASSIUM SERPL-SCNC: 3.7 MMOL/L (ref 3.5–5.1)
RBC # BLD AUTO: 4.2 X10(6)UL (ref 3.8–5.3)
SODIUM SERPL-SCNC: 140 MMOL/L (ref 136–145)
TROPONIN I SERPL HS-MCNC: 3 NG/L (ref ?–34)
WBC # BLD AUTO: 4.2 X10(3) UL (ref 4–11)

## 2025-06-30 PROCEDURE — 85025 COMPLETE CBC W/AUTO DIFF WBC: CPT

## 2025-06-30 PROCEDURE — 80048 BASIC METABOLIC PNL TOTAL CA: CPT

## 2025-06-30 PROCEDURE — 93010 ELECTROCARDIOGRAM REPORT: CPT

## 2025-06-30 PROCEDURE — 96374 THER/PROPH/DIAG INJ IV PUSH: CPT

## 2025-06-30 PROCEDURE — 84484 ASSAY OF TROPONIN QUANT: CPT

## 2025-06-30 PROCEDURE — 85025 COMPLETE CBC W/AUTO DIFF WBC: CPT | Performed by: EMERGENCY MEDICINE

## 2025-06-30 PROCEDURE — 99285 EMERGENCY DEPT VISIT HI MDM: CPT

## 2025-06-30 PROCEDURE — 80048 BASIC METABOLIC PNL TOTAL CA: CPT | Performed by: EMERGENCY MEDICINE

## 2025-06-30 PROCEDURE — 93005 ELECTROCARDIOGRAM TRACING: CPT

## 2025-06-30 PROCEDURE — 84484 ASSAY OF TROPONIN QUANT: CPT | Performed by: EMERGENCY MEDICINE

## 2025-06-30 PROCEDURE — 71045 X-RAY EXAM CHEST 1 VIEW: CPT | Performed by: STUDENT IN AN ORGANIZED HEALTH CARE EDUCATION/TRAINING PROGRAM

## 2025-06-30 RX ORDER — KETOROLAC TROMETHAMINE 15 MG/ML
15 INJECTION, SOLUTION INTRAMUSCULAR; INTRAVENOUS ONCE
Status: COMPLETED | OUTPATIENT
Start: 2025-06-30 | End: 2025-06-30

## 2025-06-30 NOTE — TELEPHONE ENCOUNTER
Patient called and said she was referred to  but he is not able to see her till end of July and is  asking if their is anyone else she can be referred to for both her knee injections.

## 2025-06-30 NOTE — TELEPHONE ENCOUNTER
REFILL PASSED PER [facility] PROTOCOLS     Please review pended refill request as unable to refill due to high/very high drug interaction warning copied here;          Current Warnings (1 unfiltered, 1 filtered)[]Show filtered (1)  Very High  Drug-Drug: spironolactone and losartanAngiotensin II receptor blockers, such as Angiotensin II Receptor Antagonists, may enhance the hyperkalemic effect of potassium-sparing diuretics (eg, Potassium-Sparing Diuretics).  Details    Future Appointments   Date Time Provider Department Center   7/30/2025 10:00 AM [facility]   8/27/2025  9:15 AM [facility]   12/30/2025 10:40 AM Isela Trimble DO [facility]  [place]

## 2025-06-30 NOTE — TELEPHONE ENCOUNTER
I will place a referral for physiatry - but please advise I don't have access to their schedules and don't know if availability will be any better. Thanks!

## 2025-07-01 VITALS
HEIGHT: 64 IN | SYSTOLIC BLOOD PRESSURE: 134 MMHG | TEMPERATURE: 98 F | DIASTOLIC BLOOD PRESSURE: 92 MMHG | WEIGHT: 293 LBS | RESPIRATION RATE: 26 BRPM | BODY MASS INDEX: 50.02 KG/M2 | HEART RATE: 75 BPM | OXYGEN SATURATION: 98 %

## 2025-07-01 LAB
ATRIAL RATE: 90 BPM
P AXIS: 43 DEGREES
P-R INTERVAL: 156 MS
Q-T INTERVAL: 426 MS
QRS DURATION: 142 MS
QTC CALCULATION (BEZET): 521 MS
R AXIS: -44 DEGREES
T AXIS: 33 DEGREES
VENTRICULAR RATE: 90 BPM

## 2025-07-01 RX ORDER — ALBUTEROL SULFATE 90 UG/1
2 INHALANT RESPIRATORY (INHALATION) EVERY 4 HOURS PRN
Qty: 1 EACH | Refills: 0 | Status: SHIPPED | OUTPATIENT
Start: 2025-07-01 | End: 2025-07-31

## 2025-07-01 RX ORDER — KETOROLAC TROMETHAMINE 10 MG/1
10 TABLET, FILM COATED ORAL EVERY 6 HOURS PRN
Qty: 20 TABLET | Refills: 0 | Status: SHIPPED | OUTPATIENT
Start: 2025-07-01 | End: 2025-07-08

## 2025-07-01 RX ORDER — LOSARTAN POTASSIUM 100 MG/1
100 TABLET ORAL DAILY
Qty: 90 TABLET | Refills: 3 | Status: SHIPPED | OUTPATIENT
Start: 2025-07-01

## 2025-07-01 NOTE — ED INITIAL ASSESSMENT (HPI)
Pt arrived to the ED for midsternal nonradiating chest pain for 3 days. Pt describes pain as dull and aching. A/O x3 and speaking complete sentences.

## 2025-07-01 NOTE — ED PROVIDER NOTES
Patient Seen in: Health system Emergency Department    History     Chief Complaint   Patient presents with    Chest Pain Angina    Fatigue       HPI        History is provided by patient/independent historian: patient  55 year old female with hx of HTN, lupus, diabetes, here with chest pain for the last 3 days. She thought she had a cold, used her inhaler and ran out. She had a recent stress test last year that was supposedly normal. No SOB. No hx of PE. No leg swelling.     History reviewed. Past Medical History[1]      History reviewed. Past Surgical History[2]      Home Medications reviewed :  Prescriptions Prior to Admission[3]      History reviewed. Social Hx on file[4]      ROS  Review of Systems   Respiratory:  Negative for shortness of breath.    Cardiovascular:  Positive for chest pain.   All other systems reviewed and are negative.     All other pertinent organ systems are reviewed and are negative.      Physical Exam     ED Triage Vitals [06/30/25 2055]   /73   Pulse 91   Resp 18   Temp 97.8 °F (36.6 °C)   Temp src Temporal   SpO2 97 %   O2 Device None (Room air)     Vital signs reviewed.      Physical Exam  Vitals and nursing note reviewed.   Cardiovascular:      Pulses: Normal pulses.   Pulmonary:      Effort: Pulmonary effort is normal. No tachypnea or respiratory distress.   Chest:      Chest wall: No tenderness.   Abdominal:      General: There is no distension.   Neurological:      Mental Status: She is alert.             ED Course       Labs:     Labs Reviewed   BASIC METABOLIC PANEL (8) - Abnormal; Notable for the following components:       Result Value    Glucose 121 (*)     Creatinine 1.07 (*)     All other components within normal limits   TROPONIN I HIGH SENSITIVITY - Normal   CBC WITH DIFFERENTIAL WITH PLATELET   RAINBOW DRAW BLUE         My EKG Interpretation: EKG    Rate, intervals and axes as noted on EKG Report.  Rate: 90  Rhythm: Sinus Rhythm with RBBB  Reading: normal for  rate, abnormal for rhythm, no acute ST elevation           As reviewed and Interpreted by me      Imaging Results Available and Reviewed while in ED:   No results found.      X-RAY CHEST 2 FRONTAL VIEWS 2220 HRS.          IMPRESSION:  -No evidence of acute cardiopulmonary disease.        The results were faxed/finalized only at 0104 hrs ET. If you would like to discuss this case directly please call [phone] (extension [phone]).  If you can't reach me at this number, do not leave a voicemail.  Please call [phone] ext [phone] and ask for the next available Radiologist.        Aric Wright MD, PhD  This report has been electronically signed and verified by the Radiologist whose name is printed above.  My review and independent interpretation of XR images: no infiltrate. Radiology report corroborates this in addition to other details as reported by them.      Decision rules/scores evaluated: none      Diagnostic labs/tests considered but not ordered: none    ED Medications Administered:   Medications   ketorolac (Toradol) 15 MG/ML injection 15 mg (15 mg Intravenous Given 6/30/25 2350)            - pt feeling improved after toradol, will trial for home with f/u with pcp    [facility]       Medical Decision Making      Differential Diagnosis: After obtaining the patient's history, performing the physical exam and reviewing the diagnostics, multiple initial diagnoses were considered based on the presenting problem including ACS, pneumonia, bronchospasm, CHF    External document review: I personally reviewed available external medical records for any recent pertinent discharge summaries, testing, and procedures - the findings are as follows: 5/8/25 visit with MANUEL Davenport for  mammogram f/u    Complicating Factors: The patient already  has a past medical history of Acute bilateral low back pain (05/13/2019), Anemia, Anesthesia complication, Asthma (HCC), Bronchitis (09/20/2023), Cataract, Essential hypertension, Glaucoma, High  blood pressure, Hypokalemia (09/20/2023), Interscapular pain (12/28/2022), Lupus, Neck pain (12/28/2022), Osteoarthritis, Prediabetes, Right otitis media (05/15/2020), Sleep apnea, Slow transit constipation (05/15/2020), Type 2 diabetes mellitus without complication (HCC) (09/20/2023), and Visual impairment. to contribute to the complexity of this ED evaluation.    Procedures performed: none    Discussed management with physician/appropriate source: none    Considered admission/deescalation of care for:  chest pain    Social determinants of health affecting patient care: none    Prescription medications considered: toradol, albuterol, discussed continuing current medication regimen    The patient requires continuous monitoring for: chest pain    Shared decision making: discussed possible admission        Disposition and Plan     Clinical Impression:  1. Chest pain, atypical        Disposition:  Discharge    Follow-up:  Susan Darnell APRN  [address] [facility] [address]  [phone]    Follow up        Medications Prescribed:  Current Discharge Medication List        START taking these medications    Details   Ketorolac Tromethamine 10 MG Oral Tab Take 1 tablet (10 mg total) by mouth every 6 (six) hours as needed.  Qty: 20 tablet, Refills: 0      !! albuterol 108 (90 Base) MCG/ACT Inhalation Aero Soln Inhale 2 puffs into the lungs every 4 (four) hours as needed.  Qty: 1 each, Refills: 0       !! - Potential duplicate medications found. Please discuss with provider.                         [1]   Past Medical History:   Acute bilateral low back pain    Anemia    Anesthesia complication    had difficulty waking up after gastric sleeve surgery in 2018    Asthma (HCC)    Bronchitis    Cataract    right eye    Essential hypertension    Glaucoma    left eye    High blood pressure    Hypokalemia    Interscapular pain    Last Assessment & Plan:    Formatting of this note might be different from the original.   Patient  reports pain is a 2/10 today.      Lupus    Neck pain    Last Assessment & Plan:    Formatting of this note might be different from the original.   Patient reports pain is a 2/10 today.      Osteoarthritis    Prediabetes    Right otitis media    Sleep apnea    Slow transit constipation    Type 2 diabetes mellitus without complication (HCC)    Visual impairment    reading glasses   [2]   Past Surgical History:  Procedure Laterality Date    Colonoscopy N/A 01/15/2025    Natacha Kan MD    Colonoscopy N/A 01/15/2025    Procedure: COLONOSCOPY;  Surgeon: Natacha Kan MD;  Location: [facility] ENDOSCOPY    Egd N/A 01/15/2025    Natacha Kan MD    Hysterectomy      Lap gastric bypass/daniel-en-y      Total abdom hysterectomy     [3] (Not in a hospital admission)   [4]   Social History  Socioeconomic History    Marital status: Single   Tobacco Use    Smoking status: Never     Passive exposure: Never    Smokeless tobacco: Never   Vaping Use    Vaping status: Never Used   Substance and Sexual Activity    Alcohol use: Never    Drug use: Never   Other Topics Concern    Reaction to local anesthetic No    Pt has a pacemaker No    Pt has a defibrillator No

## 2025-07-07 ENCOUNTER — HOSPITAL ENCOUNTER (OUTPATIENT)
Dept: GENERAL RADIOLOGY | Facility: HOSPITAL | Age: 55
Discharge: HOME | End: 2025-07-07
Attending: PHYSICAL MEDICINE & REHABILITATION
Payer: COMMERCIAL

## 2025-07-07 ENCOUNTER — OFFICE VISIT (OUTPATIENT)
Dept: PHYSICAL MEDICINE AND REHAB | Facility: CLINIC | Age: 55
End: 2025-07-07
Payer: COMMERCIAL

## 2025-07-07 VITALS — WEIGHT: 293 LBS | BODY MASS INDEX: 50.02 KG/M2 | HEIGHT: 64 IN

## 2025-07-07 DIAGNOSIS — M17.0 PRIMARY OSTEOARTHRITIS OF KNEES, BILATERAL: ICD-10-CM

## 2025-07-07 DIAGNOSIS — M17.0 PRIMARY OSTEOARTHRITIS OF KNEES, BILATERAL: Primary | ICD-10-CM

## 2025-07-07 DIAGNOSIS — M06.4 INFLAMMATORY POLYARTHRITIS (HCC): ICD-10-CM

## 2025-07-07 DIAGNOSIS — I10 BENIGN ESSENTIAL HYPERTENSION: ICD-10-CM

## 2025-07-07 DIAGNOSIS — E66.01 MORBID OBESITY (HCC): ICD-10-CM

## 2025-07-07 DIAGNOSIS — F31.81 BIPOLAR II DISORDER (HCC): ICD-10-CM

## 2025-07-07 PROBLEM — E11.9 TYPE 2 DIABETES MELLITUS WITHOUT COMPLICATION (HCC): Status: ACTIVE | Noted: 2025-07-07

## 2025-07-07 PROCEDURE — 73564 X-RAY EXAM KNEE 4 OR MORE: CPT | Performed by: PHYSICAL MEDICINE & REHABILITATION

## 2025-07-07 RX ORDER — KETOROLAC TROMETHAMINE 5 MG/ML
SOLUTION OPHTHALMIC
COMMUNITY
Start: 2025-05-21 | End: 2025-07-07

## 2025-07-07 NOTE — PROGRESS NOTES
[facility]  NEW PATIENT EVALUATION    Consultation as a request of Leslie London      HISTORY OF PRESENT ILLNESS:     Chief Complaint   Patient presents with    New Patient     New R handed pt referred by MANUEL Cash presents with BILAT knee and shoulder pain. Rates shoulder pain 5/10, knee pain 8.5/10. Pt had a cortisone injection in Left shoulder on 4/9/25, starting to wear off. Admits weakness. Denies N/T.  Patient gives verbal consent to use [facility].       History of Present Illness  The patient is a 55 year old past medical history of discoid lupus who presents with bilateral knee pain.    She has experienced bilateral knee pain for several years, with associated swelling. Steroid injections have provided relief in the past. She has tried physical therapy but not aqua therapy. X-rays from 2024 showed moderate to severe arthritis in both knees.  Her pain is localized anteriorly and medially worse with significant activity especially with walking and ambulation.  She does have pain radiating down the legs as well left worse than right down to the foot in the anterior aspect of the leg.  She does have left shoulder pain as well which bothers her but not significantly today.  Rates the pain to be 5 out of 10 in the knees.  Pain can be 8 out of 10 at times.  Her last corticosteroid injection was with Dr. Su 6 months ago.    She is currently taking Toradol for pain management due to significant swelling and difficulty walking. She was previously advised to take Tylenol Arthritis and naproxen but discontinued naproxen.    She is working on weight loss management and has consulted with bariatrics in the past.      PHYSICAL EXAM:     Ht 64\"   Wt 300 lb (136.1 kg)   BMI 51.49 kg/m²     KNEE:  Inspection: no erythema, swelling, or obvious deformity  Palpation: TTP of the bilateral medial joint lines  ROM: Full active ROM in flexion and  extension  Strength: 5/5  Sensation: Intact to light touch     IMAGING:     X-ray bilateral knees completed 5/8/2024 was notable for moderate to severe osteoarthritis in the bilateral knees    All imaging results were reviewed and discussed with patient.      ASSESSMENT/PLAN:     1. Primary osteoarthritis of knees, bilateral    2. Morbid obesity (HCC)    3. Bipolar II disorder (HCC)    4. Inflammatory polyarthritis (HCC)    5. Benign essential hypertension        Assessment & Plan  Bilateral knee osteoarthritis  Chronic bilateral knee osteoarthritis with moderate to severe arthritis on 2024 x-rays. Previous steroid and gel injections provided relief. Weight loss is crucial for future knee replacement eligibility due to high BMI.  - Order new knee x-rays to assess current status.  - Initiate aqua therapy at [facility] gym in [place].  - Administer steroid injections with ultrasound guidance.  - Consider gel injections if steroid injections wear off quickly.  - Emphasize importance of weight loss for knee replacement eligibility.    Radiculopathy, left leg  Suspected radiculopathy in the left leg with pain radiating from spine to ankle, possibly related to a disc issue.  - Monitor symptoms and consider further evaluation if symptoms persist or worsen.      The patient verbalized understanding with the plan and was in agreement. All questions/concerns were addressed and there were no barriers to learning.  Please note Dragon dictation software was used to dictate this note and may result in inadvertent typos.    David Bui DO, FAAPMR & CAQSM  Physical Medicine and Rehabilitation  Sports and Spine Medicine    PAST MEDICAL HISTORY:   Past Medical History[1]      PAST SURGICAL HISTORY:   Past Surgical History[2]      CURRENT MEDICATIONS:   Current Medications[3]      ALLERGIES:   Allergies[4]      FAMILY HISTORY:   Family History[5]       SOCIAL HISTORY:   Short Social Hx on File[6]       REVIEW OF SYSTEMS:   No  patient-reported data collected this visit.      PHYSICAL EXAM:   General: No immediate distress  Head: Normocephalic/ Atraumatic  Eyes: Extra-occular movements intact.   Ears: No auricular hematoma or deformities  Mouth: No lesions or ulcerations  Heart: peripheral pulses intact. Normal capillary refill.   Lungs: Non-labored respirations  Abdomen: No abdominal guarding  Extremities: No lower extremity edema bilaterally   Skin: No lesions noted   Cognition: alert & oriented x 3, attentive, able to follow 2 step commands, comprehention intact, spontaneous speech intact  Psychiatric: Mood and affect appropriate      LABS:     Lab Results   Component Value Date     07/17/2024    A1C 5.5 07/17/2024     Lab Results   Component Value Date    WBC 4.2 06/30/2025    RBC 4.20 06/30/2025    HGB 12.4 06/30/2025    HCT 36.7 06/30/2025    MCV 87.4 06/30/2025    MCH 29.5 06/30/2025    MCHC 33.8 06/30/2025    RDW 12.1 06/30/2025    .0 06/30/2025     Lab Results   Component Value Date     (H) 06/30/2025    BUN 15 06/30/2025    BUNCREA 14.0 06/30/2025    CREATSERUM 1.07 (H) 06/30/2025    ANIONGAP 8 06/30/2025    CA 9.1 06/30/2025    OSMOCALC 292 06/30/2025    ALKPHO 82 01/29/2025    AST 28 05/08/2025    ALT 34 05/08/2025    BILT 0.9 01/29/2025    TP 7.7 01/29/2025    ALB 4.8 01/29/2025    GLOBULIN 2.9 01/29/2025     06/30/2025    K 3.7 06/30/2025     06/30/2025    CO2 25.0 06/30/2025     No results found for: \"PTP\", \"PT\", \"INR\"  No results found for: \"VITD\", \"QVITD\", \"IPKV87SI\"       [1]   Past Medical History:   Acute bilateral low back pain    Anemia    Anesthesia complication    had difficulty waking up after gastric sleeve surgery in 2018    Asthma (HCC)    Bronchitis    Cataract    right eye    Essential hypertension    Glaucoma    left eye    High blood pressure    Hypokalemia    Interscapular pain    Last Assessment & Plan:    Formatting of this note might be different from the original.    Patient reports pain is a 2/10 today.      Lupus    Neck pain    Last Assessment & Plan:    Formatting of this note might be different from the original.   Patient reports pain is a 2/10 today.      Osteoarthritis    Prediabetes    Right otitis media    Sleep apnea    Slow transit constipation    Type 2 diabetes mellitus without complication (HCC)    Type 2 diabetes mellitus without complication (HCC)    Visual impairment    reading glasses   [2]   Past Surgical History:  Procedure Laterality Date    Colonoscopy N/A 01/15/2025    Natacha Kan MD    Colonoscopy N/A 01/15/2025    Procedure: COLONOSCOPY;  Surgeon: Natacha Kan MD;  Location: [facility] ENDOSCOPY    Egd N/A 01/15/2025    Natacha Kan MD    Hysterectomy      Lap gastric bypass/daniel-en-y      Total abdom hysterectomy     [3]   Current Outpatient Medications   Medication Sig Dispense Refill    losartan 100 MG Oral Tab Take 1 tablet (100 mg total) by mouth daily. 90 tablet 3    Ketorolac Tromethamine 10 MG Oral Tab Take 1 tablet (10 mg total) by mouth every 6 (six) hours as needed. 20 tablet 0    albuterol 108 (90 Base) MCG/ACT Inhalation Aero Soln Inhale 2 puffs into the lungs every 4 (four) hours as needed. 1 each 0    VENTOLIN  (90 Base) MCG/ACT Inhalation Aero Soln Inhale 2 puffs into the lungs every 4 (four) hours as needed for Wheezing. 1 each 0    carvedilol 12.5 MG Oral Tab Take 1 tablet (12.5 mg total) by mouth 2 (two) times daily with meals. 180 tablet 3    furosemide 20 MG Oral Tab TAKE 1 TO 2 TABLETS (20 MG TO 40 MG) BY MOUTH DAILY AS NEEDED 180 tablet 0    gabapentin 100 MG Oral Cap Take 1 capsule (100 mg total) by mouth nightly. 30 capsule 1    PANTOPRAZOLE 40 MG Oral Tab EC TAKE 1 TABLET(40 MG) BY MOUTH TWICE DAILY BEFORE MEALS 180 tablet 0    predniSONE 5 MG Oral Tab Take 1 tablet (5 mg total) by mouth daily as needed. Take as needed for pain with breakfast. 90 tablet 2    Fezolinetant (VEOZAH) 45 MG Oral Tab Take 1 tablet by mouth  daily. 30 tablet 3    HYDROXYCHLOROQUINE 200 MG Oral Tab TAKE 2 TABLETS(400 MG) BY MOUTH DAILY 180 tablet 2    loratadine 10 MG Oral Tab Take 1 tablet (10 mg total) by mouth daily. 90 tablet 3    rosuvastatin 10 MG Oral Tab Take 1 tablet (10 mg total) by mouth nightly. 90 tablet 3    fluticasone propionate 50 MCG/ACT Nasal Suspension 2 sprays by Nasal route daily. 48 g 3    Acetaminophen ER (TYLENOL 8 HOUR ARTHRITIS PAIN) 650 MG Oral Tab CR Take 1 tablet (650 mg total) by mouth every 8 (eight) hours as needed for Pain.      Blood Pressure Monitor Does not apply Device Please monitor blood pressure once daily 1 each 0    amLODIPine 5 MG Oral Tab Take 1 tablet (5 mg total) by mouth daily. 90 tablet 3    CUSTOM MEDICATION Semaglutide/cyanocobalamin injection    Concentration: 5 mg/ 0.5 mL   Amount: 2.5 mL vial x2 (total of 5 mL) with supplies  Instructions: Inject 0.1 mL weekly subcutaneous 1 each 0    CUSTOM MEDICATION Semaglutide/cyanocobalamin injection    Concentration: 1 mg/ 0.5 mL   Amount: 5 ML vial with supplies  Instructions: Inject 0.25 mL weekly 1 each 0    COSENTYX UNOREADY 300 MG/2ML Subcutaneous Solution Auto-injector every 30 (thirty) days.      clindamycin 1 % External Lotion Apply twice daily with flares 60 mL 11    Benzoyl Peroxide (BENZOYL PEROXIDE WASH) 10 % External Liquid Use daily as wash in shower to affected areas 227 g 3    latanoprost 0.005 % Ophthalmic Solution Place 1 drop into both eyes nightly.      spironolactone 25 MG Oral Tab Take 1 tablet (25 mg total) by mouth daily.      Calcium Citrate-Vitamin D 200-6.25 MG-MCG Oral Tab Take 1 tablet by mouth daily.      ferrous sulfate 325 (65 FE) MG Oral Tab EC Take 1 tablet (325 mg total) by mouth every other day.      Multiple Vitamins-Minerals (MULTI-VITAMIN/MINERALS) Oral Tab Take 1 tablet by mouth daily.      montelukast 10 MG Oral Tab Take 1 tablet (10 mg total) by mouth daily.     [4] No Known Allergies  [5] History reviewed. No pertinent  family history.  [6]   Social History  Socioeconomic History    Marital status: Single   Tobacco Use    Smoking status: Never     Passive exposure: Never    Smokeless tobacco: Never   Vaping Use    Vaping status: Never Used   Substance and Sexual Activity    Alcohol use: Never    Drug use: Never   Other Topics Concern    Reaction to local anesthetic No    Pt has a pacemaker No    Pt has a defibrillator No     Social Drivers of Health     Food Insecurity: Low Risk  (5/25/2023)    Received from [facility]    Food Insecurity     Have there been times that your food ran out, and you didn't have money to get more?: No     Are there times that you worry that this might happen?: No   Transportation Needs: Low Risk  (5/25/2023)    Received from [facility]    Transportation Needs     Do you have trouble getting transportation to medical appointments?: No

## 2025-07-07 NOTE — PROGRESS NOTES
The following individual(s) verbally consented to be recorded using ambient AI listening technology and understand that they can each withdraw their consent to this listening technology at any point by asking the clinician to turn off or pause the recording:    Patient name: Damari Waterman  Additional names:

## 2025-07-07 NOTE — PATIENT INSTRUCTIONS
-Follow up with weight loss  -Xray of both knees today  -Start aqua therapy   -Tylenol as needed  -Continue working on weight loss

## 2025-07-13 ENCOUNTER — TELEPHONE (OUTPATIENT)
Dept: ADMINISTRATIVE | Age: 55
End: 2025-07-13

## 2025-07-13 DIAGNOSIS — M17.0 BILATERAL PRIMARY OSTEOARTHRITIS OF KNEE: Primary | ICD-10-CM

## 2025-07-13 DIAGNOSIS — M17.0 PRIMARY OSTEOARTHRITIS OF KNEES, BILATERAL: ICD-10-CM

## 2025-07-14 NOTE — TELEPHONE ENCOUNTER
Reviewing the office notes, Dr. Bui recommended:  Initiate aqua therapy at [facility] gym in [place].

## 2025-07-15 ENCOUNTER — OFFICE VISIT (OUTPATIENT)
Dept: PHYSICAL MEDICINE AND REHAB | Facility: CLINIC | Age: 55
End: 2025-07-15
Payer: COMMERCIAL

## 2025-07-15 DIAGNOSIS — M17.0 PRIMARY OSTEOARTHRITIS OF KNEES, BILATERAL: Primary | ICD-10-CM

## 2025-07-15 PROCEDURE — 20611 DRAIN/INJ JOINT/BURSA W/US: CPT | Performed by: PHYSICAL MEDICINE & REHABILITATION

## 2025-07-15 RX ORDER — LIDOCAINE HYDROCHLORIDE 10 MG/ML
14 INJECTION, SOLUTION INFILTRATION; PERINEURAL ONCE
Status: COMPLETED | OUTPATIENT
Start: 2025-07-15 | End: 2025-07-15

## 2025-07-15 RX ORDER — TRIAMCINOLONE ACETONIDE 40 MG/ML
80 INJECTION, SUSPENSION INTRA-ARTICULAR; INTRAMUSCULAR ONCE
Status: COMPLETED | OUTPATIENT
Start: 2025-07-15 | End: 2025-07-15

## 2025-07-16 NOTE — TELEPHONE ENCOUNTER
Hello,    Please advise, is [facility]?   If this is the case, this is an out of network facility for this patients [facility] HMO plan.              This referral will be closed, but a new referral can be placed to any of the facilities listed above and we will obtain authorization as needed.    Thank you   Kaylie

## 2025-07-21 NOTE — TELEPHONE ENCOUNTER
RN spoke with Dr. Bui who clarified that [facility] does aqua therapy out of [facility]. RN reordered aqua therapy referral with the addition of [facility]. Routing to Kaylie Dowling for assistance with prior auth.

## 2025-07-24 NOTE — PROCEDURES
Procedure:  Ultrasound guided BILATERAL KNEE aspiration and injection with corticosteroid  The risks, benefits and anticipated outcomes of the procedure, the risks and benefits of the alternatives to the procedure, and the roles and tasks of the personnel to be involved, were discussed with the patient, and the patient consents to the procedure and agrees to proceed.  UNIVERSAL PROTOCOL / SAFETY CHECKLIST  Sign in Communication: Completed  Time Out:  Team Confirms the Correct Patient, Correct Procedure, Correct Site and Site Marking, Correct Position (if applicable), Prep and Dry Time (if applicable).        The procedure was carried out under sterile prep with  with sterile gel.  A 27 ga 1&1/4in needle was introduced and advanced with ultrasound guidance for skin anesthesia with 3 cc of 1% lidocaine.  Then, again with real time ultrasound guidance, a 18 gauge 1.5 in needle was advanced with the transducer in transverse orientation using an out of plane knee injection approach for a subpatellar injection. Following negative aspiration, a mixture of 4 cc of lidocaine and 1 cc of Kenalog (40mg/ml) was injected.  Needle was withdrawn without any complications.  Permanent pictures were taken and stored in the PACS system.    Ultrasound interpretation was performed prior to the procedure to identify the target for injection and any adjacent neurovascular structures.  Subsequently, interpretation was performed during real-time needle guidance confirming placement of the needle at the target.  Post-intervention interpretation was also performed confirming appropriate injectate flow and hemostasis. The patient tolerated the procedure without complication and was instructed in post-procedure precautions.  See patient instructions.    David Bui DO, FAAPMR & CAQSM  Physical Medicine and Rehabilitation/Sports Medicine  [facility]

## 2025-07-26 ENCOUNTER — HOSPITAL ENCOUNTER (EMERGENCY)
Facility: HOSPITAL | Age: 55
Discharge: HOME | End: 2025-07-26
Attending: EMERGENCY MEDICINE

## 2025-07-26 VITALS
DIASTOLIC BLOOD PRESSURE: 90 MMHG | OXYGEN SATURATION: 97 % | SYSTOLIC BLOOD PRESSURE: 154 MMHG | HEART RATE: 71 BPM | TEMPERATURE: 97 F | RESPIRATION RATE: 21 BRPM

## 2025-07-26 DIAGNOSIS — G50.0 TRIGEMINAL NEURALGIA: Primary | ICD-10-CM

## 2025-07-26 DIAGNOSIS — I87.2 CHRONIC VENOUS INSUFFICIENCY OF LOWER EXTREMITY: ICD-10-CM

## 2025-07-26 PROCEDURE — 96372 THER/PROPH/DIAG INJ SC/IM: CPT

## 2025-07-26 PROCEDURE — 99284 EMERGENCY DEPT VISIT MOD MDM: CPT

## 2025-07-26 PROCEDURE — 99283 EMERGENCY DEPT VISIT LOW MDM: CPT

## 2025-07-26 RX ORDER — KETOROLAC TROMETHAMINE 30 MG/ML
30 INJECTION, SOLUTION INTRAMUSCULAR; INTRAVENOUS ONCE
Status: COMPLETED | OUTPATIENT
Start: 2025-07-26 | End: 2025-07-26

## 2025-07-26 RX ORDER — CARBAMAZEPINE 100 MG/1
100 TABLET, CHEWABLE ORAL 2 TIMES DAILY
Qty: 28 TABLET | Refills: 0 | Status: SHIPPED | OUTPATIENT
Start: 2025-07-26 | End: 2025-08-09

## 2025-07-27 NOTE — ED INITIAL ASSESSMENT (HPI)
Pt arrived c/o left facial spasms & left lower thigh swelling. Pt states her facial spasms are 7/10 pain wise currently, when she is having the spasm it is 10/10 - all began 5 days ago. Pt reports left lower thigh swelling that began 1 month ago, \"has a leaky valve\" - thinks maybe she has a pinched nerve which is also causing her lots of pain. Reports tingling in toes chronically.  Pt is A&Ox4, ambulatory with slow steady gait.

## 2025-07-27 NOTE — ED PROVIDER NOTES
Patient Seen in: [facility] Emergency Department    History     Chief Complaint   Patient presents with    Swelling    Spasms       HPI    55-year-old female who for the last 5 days has been having intermittent left facial shooting pain starting from the left preauricular region.  Denies any facial swelling or any dental pain or any pain with mastication or mandibular pain.  No weakness or numbness.  She also reports years of intermittent left thigh swelling.  No increased pain or skin changes or asymmetric leg edema.    History reviewed. Past Medical History[1]    History reviewed. Past Surgical History[2]      Medications :  Prescriptions Prior to Admission[3]     Family History[4]    Smoking Status: Social Hx on file[5]    Constitutional and vital signs reviewed.      Social History and Family History elements reviewed from today, pertinent positives to the presenting problem noted.    Physical Exam     ED Triage Vitals [07/26/25 2136]   /90   Pulse 71   Resp 21   Temp 97.3 °F (36.3 °C)   Temp src Temporal   SpO2 97 %   O2 Device None (Room air)       All measures to prevent infection transmission during my interaction with the patient were taken. The patient was already wearing a droplet mask on my arrival to the room. Personal protective equipment was worn throughout the duration of the exam.  Handwashing was performed prior to and after the exam.  Stethoscope and any equipment used during my examination was cleaned with super sani-cloth germicidal wipes following the exam.     Physical Exam    General: NAD  Head: Normocephalic and atraumatic.  Mouth/Throat/Ears/Nose: Oropharynx is clear and moist.  No tenderness to percussion of any teeth.  No trismus.  Speaking clearly.  No facial swelling or any submandibular swelling or tenderness or any facial tenderness at all.    Eyes: Conjunctivae and EOM are normal.   Neck: Normal range of motion. Supple.   Cardiovascular: Normal rate, regular rhythm,  normal heart sounds.  Respiratory/Chest: Clear and equal bilaterally. Exhibits no tenderness.  Gastrointestinal: Soft, non-tender, non-distended. Bowel sounds are normal.   Musculoskeletal:No Pitting edema or deformity.  Bilateral 2+ DP pulses  Neurological: Alert and appropriate. No focal deficits.   Skin: Skin is warm and dry. No pallor.  Psychiatric: Has a normal mood and affect.      ED Course      Labs Reviewed - No data to display    As Interpreted by me    Imaging Results Available and Reviewed while in ED: No results found.  ED Medications Administered:   Medications   ketorolac (Toradol) 30 MG/ML injection 30 mg (30 mg Intramuscular Given 7/26/25 2316)         MDM     Vitals:    07/26/25 2136   BP: 154/90   Pulse: 71   Resp: 21   Temp: 97.3 °F (36.3 °C)   TempSrc: Temporal   SpO2: 97%     *I personally reviewed and interpreted all ED vitals.    Pulse Ox: 97%, Room air, Normal       Medical Decision Making      Differential Diagnosis/ Diagnostic Considerations: Trigeminal neuralgia, TMJ disorder    Complicating Factors: The patient already has HTN to contribute to the complexity of this ED evaluation.    I reviewed prior chart records including office note from July 15, 2025.  Patient here with left trigeminal neuralgia, carbamazepine low-dose prescribed, neurology referral provided. Considered admission however pain is well-controlled.   I also reviewed the duplex ultrasound result from April 10, 2025 when there was no DVT.  I see no indication for repeat ultrasound imaging, very low suspicion for DVT.    Dc In stable condition.  Patient is comfortable with the plan.  Prescriptions: As above      Disposition and Plan     Clinical Impression:  1. Trigeminal neuralgia    2. Chronic venous insufficiency of lower extremity        Disposition:  Discharge    Follow-up:  [facility], [place] [address]  [facility] [address] [phone]  Call  choose  option 1 for general neurology      Medications Prescribed:  Discharge Medication List as of 7/26/2025 11:43 PM        START taking these medications    Details   carBAMazepine 100 MG Oral Chew Tab Chew 1 tablet (100 mg total) by mouth 2 (two) times daily for 14 days., Normal, Disp-28 tablet, R-0Can substitute formulation but keep 100mg bid                              [1]   Past Medical History:   Acute bilateral low back pain    Anemia    Anesthesia complication    had difficulty waking up after gastric sleeve surgery in 2018    Asthma (HCC)    Bronchitis    Cataract    right eye    Essential hypertension    Glaucoma    left eye    High blood pressure    Hypokalemia    Interscapular pain    Last Assessment & Plan:    Formatting of this note might be different from the original.   Patient reports pain is a 2/10 today.      Lupus    Neck pain    Last Assessment & Plan:    Formatting of this note might be different from the original.   Patient reports pain is a 2/10 today.      Osteoarthritis    Prediabetes    Right otitis media    Sleep apnea    Slow transit constipation    Type 2 diabetes mellitus without complication (HCC)    Type 2 diabetes mellitus without complication (HCC)    Visual impairment    reading glasses   [2]   Past Surgical History:  Procedure Laterality Date    Colonoscopy N/A 01/15/2025    Natacha Kan MD    Colonoscopy N/A 01/15/2025    Procedure: COLONOSCOPY;  Surgeon: Natacha Kan MD;  Location: [facility] ENDOSCOPY    Egd N/A 01/15/2025    Natacha Kan MD    Hysterectomy      Lap gastric bypass/daniel-en-y      Total abdom hysterectomy     [3] (Not in a hospital admission)   [4] History reviewed. No pertinent family history.  [5]   Social History  Socioeconomic History    Marital status: Single   Tobacco Use    Smoking status: Never     Passive exposure: Never    Smokeless tobacco: Never   Vaping Use    Vaping status: Never Used   Substance and Sexual Activity    Alcohol use: Never    Drug use:  Never   Other Topics Concern    Reaction to local anesthetic No    Pt has a pacemaker No    Pt has a defibrillator No

## 2025-07-28 ENCOUNTER — TELEPHONE (OUTPATIENT)
Dept: INTERNAL MEDICINE CLINIC | Facility: CLINIC | Age: 55
End: 2025-07-28

## 2025-07-28 DIAGNOSIS — G50.0 TRIGEMINAL NEURALGIA: Primary | ICD-10-CM

## 2025-07-28 RX ORDER — PANTOPRAZOLE SODIUM 40 MG/1
40 TABLET, DELAYED RELEASE ORAL
Qty: 180 TABLET | Refills: 0 | Status: SHIPPED | OUTPATIENT
Start: 2025-07-28

## 2025-07-28 NOTE — TELEPHONE ENCOUNTER
Requested Prescriptions     Pending Prescriptions Disp Refills    PANTOPRAZOLE 40 MG Oral Tab EC [Pharmacy Med Name: PANTOPRAZOLE 40MG TABLETS] 180 tablet 0     Sig: TAKE 1 TABLET(40 MG) BY MOUTH TWICE DAILY BEFORE MEALS         LOV: 05/06/2024  LR: 04/21/2025  Last Colonoscopy: 01/15/2025  gb

## 2025-07-29 ENCOUNTER — LAB ENCOUNTER (OUTPATIENT)
Dept: LAB | Age: 55
End: 2025-07-29
Attending: NURSE PRACTITIONER

## 2025-07-29 ENCOUNTER — OFFICE VISIT (OUTPATIENT)
Dept: INTERNAL MEDICINE CLINIC | Facility: CLINIC | Age: 55
End: 2025-07-29

## 2025-07-29 VITALS
RESPIRATION RATE: 16 BRPM | BODY MASS INDEX: 50.02 KG/M2 | WEIGHT: 293 LBS | SYSTOLIC BLOOD PRESSURE: 143 MMHG | HEIGHT: 64 IN | OXYGEN SATURATION: 97 % | TEMPERATURE: 97 F | DIASTOLIC BLOOD PRESSURE: 80 MMHG | HEART RATE: 87 BPM

## 2025-07-29 DIAGNOSIS — R73.9 HYPERGLYCEMIA: ICD-10-CM

## 2025-07-29 DIAGNOSIS — R63.5 WEIGHT GAIN: ICD-10-CM

## 2025-07-29 DIAGNOSIS — G50.9 TRIGEMINAL NERVE DISEASE OR SYNDROME: Primary | ICD-10-CM

## 2025-07-29 LAB
EST. AVERAGE GLUCOSE BLD GHB EST-MCNC: 146 MG/DL (ref 68–126)
HBA1C MFR BLD: 6.7 % (ref ?–5.7)
TSI SER-ACNC: 1.01 UIU/ML (ref 0.55–4.78)

## 2025-07-29 PROCEDURE — 83036 HEMOGLOBIN GLYCOSYLATED A1C: CPT

## 2025-07-29 PROCEDURE — 84443 ASSAY THYROID STIM HORMONE: CPT

## 2025-07-29 PROCEDURE — 3077F SYST BP >= 140 MM HG: CPT | Performed by: NURSE PRACTITIONER

## 2025-07-29 PROCEDURE — 3079F DIAST BP 80-89 MM HG: CPT | Performed by: NURSE PRACTITIONER

## 2025-07-29 PROCEDURE — 3008F BODY MASS INDEX DOCD: CPT | Performed by: NURSE PRACTITIONER

## 2025-07-29 PROCEDURE — 3044F HG A1C LEVEL LT 7.0%: CPT | Performed by: NURSE PRACTITIONER

## 2025-07-29 PROCEDURE — 36415 COLL VENOUS BLD VENIPUNCTURE: CPT

## 2025-07-29 PROCEDURE — 99214 OFFICE O/P EST MOD 30 MIN: CPT | Performed by: NURSE PRACTITIONER

## 2025-07-30 ENCOUNTER — TELEPHONE (OUTPATIENT)
Dept: PHYSICAL MEDICINE AND REHAB | Facility: CLINIC | Age: 55
End: 2025-07-30

## 2025-07-30 RX ORDER — METFORMIN HYDROCHLORIDE 500 MG/1
500 TABLET, EXTENDED RELEASE ORAL DAILY
Qty: 30 TABLET | Refills: 0 | Status: SHIPPED | OUTPATIENT
Start: 2025-07-30

## 2025-08-01 RX ORDER — METFORMIN HYDROCHLORIDE 500 MG/1
500 TABLET, EXTENDED RELEASE ORAL DAILY
Qty: 90 TABLET | Refills: 0 | OUTPATIENT
Start: 2025-08-01

## 2025-08-02 DIAGNOSIS — I10 BENIGN ESSENTIAL HYPERTENSION: ICD-10-CM

## 2025-08-02 DIAGNOSIS — M25.472 LEFT ANKLE SWELLING: ICD-10-CM

## 2025-08-04 ENCOUNTER — TELEMEDICINE (OUTPATIENT)
Dept: INTERNAL MEDICINE CLINIC | Facility: CLINIC | Age: 55
End: 2025-08-04

## 2025-08-04 DIAGNOSIS — G50.0 TRIGEMINAL NEURALGIA: ICD-10-CM

## 2025-08-04 DIAGNOSIS — E11.9 TYPE 2 DIABETES MELLITUS WITHOUT COMPLICATION, WITHOUT LONG-TERM CURRENT USE OF INSULIN (HCC): Primary | ICD-10-CM

## 2025-08-04 RX ORDER — SEMAGLUTIDE 0.68 MG/ML
0.25 INJECTION, SOLUTION SUBCUTANEOUS WEEKLY
Qty: 2 ML | Refills: 0 | Status: SHIPPED | OUTPATIENT
Start: 2025-08-04

## 2025-08-05 ENCOUNTER — OFFICE VISIT (OUTPATIENT)
Dept: PHYSICAL MEDICINE AND REHAB | Facility: CLINIC | Age: 55
End: 2025-08-05

## 2025-08-05 VITALS — BODY MASS INDEX: 50.02 KG/M2 | HEIGHT: 64 IN | WEIGHT: 293 LBS

## 2025-08-05 DIAGNOSIS — M17.0 PRIMARY OSTEOARTHRITIS OF KNEES, BILATERAL: Primary | ICD-10-CM

## 2025-08-05 PROCEDURE — 99214 OFFICE O/P EST MOD 30 MIN: CPT | Performed by: PHYSICAL MEDICINE & REHABILITATION

## 2025-08-05 PROCEDURE — 3008F BODY MASS INDEX DOCD: CPT | Performed by: PHYSICAL MEDICINE & REHABILITATION

## 2025-08-07 RX ORDER — FUROSEMIDE 20 MG/1
TABLET ORAL
Qty: 180 TABLET | Refills: 3 | Status: SHIPPED | OUTPATIENT
Start: 2025-08-07

## 2025-08-12 ENCOUNTER — OFFICE VISIT (OUTPATIENT)
Dept: SURGERY | Facility: CLINIC | Age: 55
End: 2025-08-12

## 2025-08-12 ENCOUNTER — PATIENT MESSAGE (OUTPATIENT)
Facility: CLINIC | Age: 55
End: 2025-08-12

## 2025-08-12 VITALS
HEIGHT: 61.8 IN | BODY MASS INDEX: 53.92 KG/M2 | SYSTOLIC BLOOD PRESSURE: 136 MMHG | WEIGHT: 293 LBS | OXYGEN SATURATION: 98 % | HEART RATE: 86 BPM | DIASTOLIC BLOOD PRESSURE: 74 MMHG

## 2025-08-12 DIAGNOSIS — F41.1 GENERALIZED ANXIETY DISORDER: ICD-10-CM

## 2025-08-12 DIAGNOSIS — I10 BENIGN ESSENTIAL HYPERTENSION: ICD-10-CM

## 2025-08-12 DIAGNOSIS — K21.9 GASTROESOPHAGEAL REFLUX DISEASE, UNSPECIFIED WHETHER ESOPHAGITIS PRESENT: ICD-10-CM

## 2025-08-12 DIAGNOSIS — M06.4 INFLAMMATORY POLYARTHRITIS (HCC): ICD-10-CM

## 2025-08-12 DIAGNOSIS — R73.03 PREDIABETES: ICD-10-CM

## 2025-08-12 DIAGNOSIS — E11.9 DIABETES MELLITUS TYPE 2, NONINSULIN DEPENDENT (HCC): Primary | ICD-10-CM

## 2025-08-12 DIAGNOSIS — F31.81 BIPOLAR II DISORDER (HCC): ICD-10-CM

## 2025-08-12 PROCEDURE — 3078F DIAST BP <80 MM HG: CPT | Performed by: INTERNAL MEDICINE

## 2025-08-12 PROCEDURE — 99214 OFFICE O/P EST MOD 30 MIN: CPT | Performed by: INTERNAL MEDICINE

## 2025-08-12 PROCEDURE — 3075F SYST BP GE 130 - 139MM HG: CPT | Performed by: INTERNAL MEDICINE

## 2025-08-12 PROCEDURE — 3008F BODY MASS INDEX DOCD: CPT | Performed by: INTERNAL MEDICINE

## 2025-08-12 RX ORDER — TIRZEPATIDE 2.5 MG/.5ML
2.5 INJECTION, SOLUTION SUBCUTANEOUS WEEKLY
Qty: 2 ML | Refills: 0 | Status: SHIPPED | OUTPATIENT
Start: 2025-08-12

## 2025-08-12 RX ORDER — METFORMIN HYDROCHLORIDE 500 MG/1
500 TABLET, EXTENDED RELEASE ORAL DAILY
Qty: 30 TABLET | Refills: 0 | Status: SHIPPED | OUTPATIENT
Start: 2025-08-12

## 2025-08-13 ENCOUNTER — OFFICE VISIT (OUTPATIENT)
Dept: NEUROLOGY | Facility: CLINIC | Age: 55
End: 2025-08-13

## 2025-08-13 ENCOUNTER — PATIENT MESSAGE (OUTPATIENT)
Dept: INTERNAL MEDICINE CLINIC | Facility: CLINIC | Age: 55
End: 2025-08-13

## 2025-08-13 VITALS
BODY MASS INDEX: 59 KG/M2 | RESPIRATION RATE: 16 BRPM | WEIGHT: 293 LBS | DIASTOLIC BLOOD PRESSURE: 66 MMHG | HEART RATE: 82 BPM | SYSTOLIC BLOOD PRESSURE: 116 MMHG

## 2025-08-13 DIAGNOSIS — G50.9 TRIGEMINAL NERVE DISEASE OR SYNDROME: ICD-10-CM

## 2025-08-13 DIAGNOSIS — R20.2 FACIAL PARESTHESIA: ICD-10-CM

## 2025-08-13 DIAGNOSIS — G50.0 TRIGEMINAL NEURALGIA OF LEFT SIDE OF FACE: Primary | ICD-10-CM

## 2025-08-13 DIAGNOSIS — G50.0 TRIGEMINAL NEURALGIA: Primary | ICD-10-CM

## 2025-08-13 PROCEDURE — 99204 OFFICE O/P NEW MOD 45 MIN: CPT | Performed by: OTHER

## 2025-08-13 PROCEDURE — 3078F DIAST BP <80 MM HG: CPT | Performed by: OTHER

## 2025-08-13 PROCEDURE — 3074F SYST BP LT 130 MM HG: CPT | Performed by: OTHER

## 2025-08-13 RX ORDER — GABAPENTIN 100 MG/1
100 CAPSULE ORAL 3 TIMES DAILY
Qty: 90 CAPSULE | Refills: 3 | Status: SHIPPED | OUTPATIENT
Start: 2025-08-13

## 2025-08-19 ENCOUNTER — PATIENT MESSAGE (OUTPATIENT)
Dept: PHYSICAL MEDICINE AND REHAB | Facility: CLINIC | Age: 55
End: 2025-08-19

## 2025-08-19 DIAGNOSIS — M17.0 PRIMARY OSTEOARTHRITIS OF KNEES, BILATERAL: Primary | ICD-10-CM

## 2025-08-27 ENCOUNTER — OFFICE VISIT (OUTPATIENT)
Dept: DERMATOLOGY CLINIC | Facility: CLINIC | Age: 55
End: 2025-08-27

## 2025-08-27 DIAGNOSIS — Z51.81 MEDICATION MONITORING ENCOUNTER: ICD-10-CM

## 2025-08-27 DIAGNOSIS — L73.2 HIDRADENITIS SUPPURATIVA: Primary | ICD-10-CM

## 2025-08-27 PROCEDURE — 99214 OFFICE O/P EST MOD 30 MIN: CPT | Performed by: STUDENT IN AN ORGANIZED HEALTH CARE EDUCATION/TRAINING PROGRAM

## 2025-08-27 RX ORDER — CLINDAMYCIN PHOSPHATE 10 UG/ML
LOTION TOPICAL
Qty: 60 ML | Refills: 5 | Status: SHIPPED | OUTPATIENT
Start: 2025-08-27

## (undated) DEVICE — KIT ENDO ORCAPOD 160/180/190

## (undated) DEVICE — Device

## (undated) DEVICE — V2 SPECIMEN COLLECTION MANIFOLD KIT: Brand: NEPTUNE

## (undated) DEVICE — MEDI-VAC NON-CONDUCTIVE SUCTION TUBING: Brand: CARDINAL HEALTH

## (undated) DEVICE — GIJAW SINGLE-USE BIOPSY FORCEPS WITH NEEDLE: Brand: GIJAW

## (undated) DEVICE — YANKAUER,BULB TIP,W/O VENT,RIGID,STERILE: Brand: MEDLINE

## (undated) DEVICE — CONMED SCOPE SAVER BITE BLOCK, 20X27 MM: Brand: SCOPE SAVER

## (undated) DEVICE — KIT CLEAN ENDOKIT 1.1OZ GOWNX2

## (undated) DEVICE — 60 ML SYRINGE REGULAR TIP: Brand: MONOJECT

## (undated) DEVICE — MEDI-VAC NON-CONDUCTIVE SUCTION TUBING 6MM X 1.8M (6FT.) L: Brand: CARDINAL HEALTH

## (undated) NOTE — LETTER
Date: 2025      Patient Name: Damari Waterman      : 1970        Thank you for choosing [facility] as your health care provider. Your physician has deemed the following medical service(s) necessary. However, your insurance plan may not pay for all of your health care and costs and may deny payment for this service. The fact that your insurance plan does not pay for an item or service does not mean you should not receive it. The purpose of this form is to help you make an informed decision about whether or not you want to receive this service(s) that may not be paid for by your insurance plan.    CPT Code Description     Cost     Ultrasound guided bilateral knee aspiration and injection with   corticosteroid       I understand that the above mentioned service(s) or supply may not be covered by my insurance company. I agree to be financially responsible for the cost of this service or supply in the event of my insurance denies payment as a non-covered benefit.        ______________________________________________________________________  Signature of Patient or Patient's Representative  Relationship  Date    ______________________________________________________________________  Signature of Witness to signing of form   Printed Name

## (undated) NOTE — LETTER
AUTHORIZATION FOR SURGICAL OPERATION OR OTHER PROCEDURE    1. I hereby authorize Dr. David Bui and the [facility] staff assigned to my case to perform the following operation and/or procedure at the [facility] Office:    _______________________________________________________________________________________________    Ultrasound guided bilateral knee aspiration and injection with   corticosteroid   _______________________________________________________________________________________________    2.  My physician has explained the nature and purpose of the operation or other procedure, possible alternative methods of treatment, the risks involved, and the possibility of complication to me.  I acknowledge that no guarantee has been made as to the result that may be obtained.  3.  I recognize that, during the course of this operation, or other procedure, unforseen conditions may necessitate additional or different procedure than those listed above.  I, therefore, further authorize and request that the above named physician, his/her physician assistants or designees perform such procedures as are, in his/her professional opinion, necessary and desirable.  4.  Any tissue or organs removed in the operation or other procedure may be disposed of by and at the discretion of the [facility] staff and [facility].  5.  I understand that in the event of a medical emergency, I will be transported by local paramedics to [facility] or other hospital emergency department.  6.  I certify that I have read and fully understand the above consent to operation and/or other procedure.    7.  I acknowledge that my physician has explained sedation/analgesia administration to me including the risks and benefits.  I consent to the administration of sedation/analgesia as may be necessary or desirable in the judgement of my physician.    Witness signature: ___________________________________________________ Date:   ______/______/_____                    Time:  ________ A.M.  P.M.       Patient Name:    Damari Waterman  4/9/1970  [id number]     Patient signature:  ___________________________________________________      Statement of Physician  My signature below affirms that prior to the time of the procedure, I have explained to the patient and/or his/her guardian, the risks and benefits involved in the proposed treatment and any reasonable alternative to the proposed treatment.  I have also explained the risks and benefits involved in the refusal of the proposed treatment and have answered the patient's questions.                        Date:  ______/______/_______  Provider                      Signature:  __________________________________________________________       Time:  ___________ A.M    P.M.

## (undated) NOTE — LETTER
04/21/25        Damari Waterman  [address] [facility] [address]      Dear Damari,    Our records indicate that you have outstanding lab work and or testing that was ordered for you and has not yet been completed:    To provide you with the best possible care, please complete these orders at your earliest convenience. If you have recently completed these orders please disregard this letter.     If you have any questions please call the office at No information on file..     Thank you,       Not in an encounter context.

## (undated) NOTE — Clinical Note
Please schedule this patient for ultrasound-guided bilateral knee aspiration injection in the office in the next week can double book her if needed

## (undated) NOTE — LETTER
AUTHORIZATION FOR SURGICAL OPERATION OR OTHER PROCEDURE    1. I hereby authorize Dr. Su , and [facility] staff assigned to my case to perform the following operation and/or procedure at the [facility] site:    _______________________________________________________________________________________________    Cortisone injection right shoulder  _______________________________________________________________________________________________    2.  My physician has explained the nature and purpose of the operation or other procedure, possible alternative methods of treatment, the risks involved, and the possibility of complication to me.  I acknowledge that no guarantee has been made as to the result that may be obtained.  3.  I recognize that, during the course of this operation, or other procedure, unforseen conditions may necessitate additional or different procedure than those listed above.  I, therefore, further authorize and request that the above named physician, his/her physician assistants or designees perform such procedures as are, in his/her professional opinion, necessary and desirable.  4.  Any tissue or organs removed in the operation or other procedure may be disposed of by and at the discretion of the [facility] and [facility].  5.  I understand that in the event of a medical emergency, I will be transported by local paramedics to [facility] or other hospital emergency department.  6.  I certify that I have read and fully understand the above consent to operation and/or other procedure.    7.  I acknowledge that my physician has explained sedation/analgesia administration to me including the risks and benefits.  I consent to the administration of sedation/analgesia as may be necessary or desirable in the judgement of my physician.    Witness signature: ___________________________________________________ Date:   ______/______/_____                    Time:  ________ A.M.  P.M.       Patient Name:  ______________________________________________________  (please print)      Patient signature:  ___________________________________________________             Relationship to Patient:           []  Parent    Responsible person                          []  Spouse  In case of minor or                    [] Other  _____________   Incompetent name:  __________________________________________________                               (please print)      _____________      Responsible person  In case of minor or  Incompetent signature:  _______________________________________________    Statement of Physician  My signature below affirms that prior to the time of the procedure, I have explained to the patient and/or his/her guardian, the risks and benefits involved in the proposed treatment and any reasonable alternative to the proposed treatment.  I have also explained the risks and benefits involved in the refusal of the proposed treatment and have answered the patient's questions.                        Date:  ______/______/_______  Provider                      Signature:  __________________________________________________________       Time:  ___________ A.M    P.M.

## (undated) NOTE — LETTER
AUTHORIZATION FOR SURGICAL OPERATION OR OTHER PROCEDURE    1. I hereby authorize Dr. Su, and [facility] staff assigned to my case to perform the following operation and/or procedure at the [facility] site:    _____________________________________Left knee cortisone injection  __________________________________________________________      _______________________________________________________________________________________________    2.  My physician has explained the nature and purpose of the operation or other procedure, possible alternative methods of treatment, the risks involved, and the possibility of complication to me.  I acknowledge that no guarantee has been made as to the result that may be obtained.  3.  I recognize that, during the course of this operation, or other procedure, unforseen conditions may necessitate additional or different procedure than those listed above.  I, therefore, further authorize and request that the above named physician, his/her physician assistants or designees perform such procedures as are, in his/her professional opinion, necessary and desirable.  4.  Any tissue or organs removed in the operation or other procedure may be disposed of by and at the discretion of the [facility] and [facility].  5.  I understand that in the event of a medical emergency, I will be transported by local paramedics to [facility] or other hospital emergency department.  6.  I certify that I have read and fully understand the above consent to operation and/or other procedure.    7.  I acknowledge that my physician has explained sedation/analgesia administration to me including the risks and benefits.  I consent to the administration of sedation/analgesia as may be necessary or desirable in the judgement of my physician.    Witness signature: ___________________________________________________ Date:  ______/______/_____                     Time:  ________ A.M.  P.M.       Patient Name:  ______________________________________________________  (please print)      Patient signature:  ___________________________________________________             Relationship to Patient:           []  Parent    Responsible person                          []  Spouse  In case of minor or                    [] Other  _____________   Incompetent name:  __________________________________________________                               (please print)      _____________      Responsible person  In case of minor or  Incompetent signature:  _______________________________________________    Statement of Physician  My signature below affirms that prior to the time of the procedure, I have explained to the patient and/or his/her guardian, the risks and benefits involved in the proposed treatment and any reasonable alternative to the proposed treatment.  I have also explained the risks and benefits involved in the refusal of the proposed treatment and have answered the patient's questions.                        Date:  ______/______/_______  Provider                      Signature:  __________________________________________________________       Time:  ___________ A.M    P.M.

## (undated) NOTE — LETTER
[facility]  [address], [place], [place]    Authorization for Surgical Operation and Procedure                               I hereby authorize Natacha Kan MD, my physician and his/her assistants (if applicable), which may include medical students, residents, and/or fellows, to perform the following surgical operation/ procedure and administer such anesthesia as may be determined necessary by my physician: Operation/Procedure name (s) COLONOSCOPY / ESOPHAGOGASTRODUODENOSCOPY on Damari Waterman   2.   I recognize that during the surgical operation/procedure, unforeseen conditions may necessitate additional or different procedures than those listed above.  I, therefore, further authorize and request that the above-named surgeon, assistants, or designees perform such procedures as are, in their judgment, necessary and desirable.    3.   My surgeon/physician has discussed prior to my surgery the potential benefits, risks and side effects of this procedure; the likelihood of achieving goals; and potential problems that might occur during recuperation.  They also discussed reasonable alternatives to the procedure, including risks, benefits, and side effects related to the alternatives and risks related to not receiving this procedure.  I have had all my questions answered and I acknowledge that no guarantee has been made as to the result that may be obtained.    4.   Should the need arise during my operation/procedure, which includes change of level of care prior to discharge, I also consent to the administration of blood and/or blood products.  Further, I understand that despite careful testing and screening of blood or blood products by collecting agencies, I may still be subject to ill effects as a result of receiving a blood transfusion and/or blood products.  The following are some, but not all, of the potential risks that can occur: fever and allergic reactions, hemolytic reactions,  transmission of diseases such as Hepatitis, AIDS and Cytomegalovirus (CMV) and fluid overload.  In the event that I wish to have an autologous transfusion of my own blood, or a directed donor transfusion, I will discuss this with my physician.  Check only if Refusing Blood or Blood Products  I understand refusal of blood or blood products as deemed necessary by my physician may have serious consequences to my condition to include possible death. I hereby assume responsibility for my refusal and release the hospital, its personnel, and my physicians from any responsibility for the consequences of my refusal.    o  Refuse   5.   I authorize the use of any specimen, organs, tissues, body parts or foreign objects that may be removed from my body during the operation/procedure for diagnosis, research or teaching purposes and their subsequent disposal by hospital authorities.  I also authorize the release of specimen test results and/or written reports to my treating physician on the hospital medical staff or other referring or consulting physicians involved in my care, at the discretion of the Pathologist or my treating physician.    6.   I consent to the photographing or videotaping of the operations or procedures to be performed, including appropriate portions of my body for medical, scientific, or educational purposes, provided my identity is not revealed by the pictures or by descriptive texts accompanying them.  If the procedure has been photographed/videotaped, the surgeon will obtain the original picture, image, videotape or CD.  The hospital will not be responsible for storage, release or maintenance of the picture, image, tape or CD.    7.   I consent to the presence of a  or observers in the operating room as deemed necessary by my physician or their designees.    8.   I recognize that in the event my procedure results in extended X-Ray/fluoroscopy time, I may develop a skin reaction.    9. If  I have a Do Not Attempt Resuscitation (DNAR) order in place, that status will be suspended while in the operating room, procedural suite, and during the recovery period unless otherwise explicitly stated by me (or a person authorized to consent on my behalf). The surgeon or my attending physician will determine when the applicable recovery period ends for purposes of reinstating the DNAR order.  10. Patients having a sterilization procedure: I understand that if the procedure is successful the results will be permanent and it will therefore be impossible for me to inseminate, conceive, or bear children.  I also understand that the procedure is intended to result in sterility, although the result has not been guaranteed.   11. I acknowledge that my physician has explained sedation/analgesia administration to me including the risk and benefits I consent to the administration of sedation/analgesia as may be necessary or desirable in the judgment of my physician.    I CERTIFY THAT I HAVE READ AND FULLY UNDERSTAND THE ABOVE CONSENT TO OPERATION and/or OTHER PROCEDURE.     ____________________________________  _________________________________        ______________________________  Signature of Patient    Signature of Responsible Person                Printed Name of Responsible Person                                      ____________________________________  _____________________________                ________________________________  Signature of Witness        Date  Time         Relationship to Patient    STATEMENT OF PHYSICIAN My signature below affirms that prior to the time of the procedure; I have explained to the patient and/or his/her legal representative, the risks and benefits involved in the proposed treatment and any reasonable alternative to the proposed treatment. I have also explained the risks and benefits involved in refusal of the proposed treatment and alternatives to the proposed treatment and have  answered the patient's questions. If I have a significant financial interest in a co-management agreement or a significant financial interest in any product or implant, or other significant relationship used in this procedure/surgery, I have disclosed this and had a discussion with my patient.     _____________________________________________________              _____________________________  (Signature of Physician)                                                                                         (Date)                                   (Time)  Patient Name: Damari Waterman      : 1970      Printed: 1/10/2025     Medical Record #: [id number]                                      Page 1 of 1

## (undated) NOTE — LETTER
3/6/2025            Medical Grade Compression Hose        Patient: Damari Waterman      YOB: 1970           Diagnosis: Bilateral lower extremity Edema        Compression: 30-40mmHg- Firm  Style: Waist        Amount: X 2  HCPS: - Waist          Physician Signature: _____________________  Date:  03/06/25                          Sincerely,    MANUEL Frederick

## (undated) NOTE — LETTER
[facility]  Administration of Anesthesia  I, Damari Waterman agree to be cared for by a physician anesthesiologist alone and/or with a nurse anesthetist, who is specially trained to monitor me and give me medicine to put me to sleep or keep me comfortable during my procedure    I understand that my anesthesiologist and/or anesthetist is not an employee or agent of [facility] or [email or web address]. He or she works for [facility]    As the patient asking for anesthesia services, I agree to:  Allow the anesthesiologist (anesthesia doctor) to give me medicine and do additional procedures as necessary. Some examples are: Starting or using an “IV” to give me medicine, fluids or blood during my procedure, and having a breathing tube placed to help me breathe when I’m asleep (intubation). In the event that my heart stops working properly, I understand that my anesthesiologist will make every effort to sustain my life, unless otherwise directed by [facility] Do Not Resuscitate documents.  Tell my anesthesia doctor before my procedure:  If I am pregnant.  The last time that I ate or drank.  iii. All of the medicines I take (including prescriptions, herbal supplements, and pills I can buy without a prescription (including street drugs/illegal medications). Failure to inform my anesthesiologist about these medicines may increase my risk of anesthetic complications.  iv.If I am allergic to anything or have had a reaction to anesthesia before.  I understand how the anesthesia medicine will help me (benefits).  I understand that with any type of anesthesia medicine there are risks:  The most common risks are: nausea, vomiting, sore throat, muscle soreness, damage to my eyes, mouth, or teeth (from breathing tube placement).  Rare risks include: remembering what happened during my procedure, allergic reactions to medications, injury to my airway, heart, lungs, vision, nerves, or  muscles and in extremely rare instances death.  My doctor has explained to me other choices available to me for my care (alternatives).  Pregnant Patients (“epidural”):  I understand that the risks of having an epidural (medicine given into my back to help control pain during labor), include itching, low blood pressure, difficulty urinating, headache or slowing of the baby’s heart. Very rare risks include infection, bleeding, seizure, irregular heart rhythms and nerve injury.  Regional Anesthesia (“spinal”, “epidural”, & “nerve blocks”):  I understand that rare but potential complications include headache, bleeding, infection, seizure, irregular heart rhythms, and nerve injury.    _____________________________________________________________________________  Patient (or Representative) Signature/Relationship to Patient  Date   Time    _____________________________________________________________________________   Name (if used)    Language/Organization   Time    _____________________________________________________________________________  Nurse Anesthetist Signature     Date   Time  _____________________________________________________________________________  Anesthesiologist Signature     Date   Time  I have discussed the procedure and information above with the patient (or patient’s representative) and answered their questions. The patient or their representative has agreed to have anesthesia services.    _____________________________________________________________________________  Witness        Date   Time  I have verified that the signature is that of the patient or patient’s representative, and that it was signed before the procedure  Patient Name: Damari Waterman     : 1970                 Printed: 1/10/2025 at 3:34 PM    Medical Record #: [id number]                                            Page 1 of 1  ----------ANESTHESIA CONSENT----------